# Patient Record
Sex: MALE | Race: ASIAN | NOT HISPANIC OR LATINO | Employment: OTHER | URBAN - METROPOLITAN AREA
[De-identification: names, ages, dates, MRNs, and addresses within clinical notes are randomized per-mention and may not be internally consistent; named-entity substitution may affect disease eponyms.]

---

## 2018-01-03 ENCOUNTER — TRANSCRIBE ORDERS (OUTPATIENT)
Dept: ADMINISTRATIVE | Facility: HOSPITAL | Age: 38
End: 2018-01-03

## 2018-01-03 ENCOUNTER — ALLSCRIPTS OFFICE VISIT (OUTPATIENT)
Dept: OTHER | Facility: OTHER | Age: 38
End: 2018-01-03

## 2018-01-03 DIAGNOSIS — G40.309 GENERALIZED IDIOPATHIC EPILEPSY AND EPILEPTIC SYNDROMES, WITHOUT STATUS EPILEPTICUS, NOT INTRACTABLE (HCC): ICD-10-CM

## 2018-01-03 DIAGNOSIS — G40.802 CURSIVE EPILEPSY (HCC): Primary | ICD-10-CM

## 2018-01-04 ENCOUNTER — APPOINTMENT (OUTPATIENT)
Dept: LAB | Facility: HOSPITAL | Age: 38
End: 2018-01-04
Attending: PSYCHIATRY & NEUROLOGY
Payer: COMMERCIAL

## 2018-01-04 DIAGNOSIS — G40.309 GENERALIZED IDIOPATHIC EPILEPSY AND EPILEPTIC SYNDROMES, WITHOUT STATUS EPILEPTICUS, NOT INTRACTABLE (HCC): ICD-10-CM

## 2018-01-04 LAB
ALBUMIN SERPL BCP-MCNC: 4.5 G/DL (ref 3.5–5)
ALP SERPL-CCNC: 59 U/L (ref 46–116)
ALT SERPL W P-5'-P-CCNC: 79 U/L (ref 12–78)
ANION GAP SERPL CALCULATED.3IONS-SCNC: 9 MMOL/L (ref 4–13)
AST SERPL W P-5'-P-CCNC: 38 U/L (ref 5–45)
BASOPHILS # BLD AUTO: 0.03 THOUSANDS/ΜL (ref 0–0.1)
BASOPHILS NFR BLD AUTO: 1 % (ref 0–1)
BILIRUB SERPL-MCNC: 0.5 MG/DL (ref 0.2–1)
BUN SERPL-MCNC: 14 MG/DL (ref 5–25)
CALCIUM SERPL-MCNC: 9.8 MG/DL (ref 8.3–10.1)
CHLORIDE SERPL-SCNC: 101 MMOL/L (ref 100–108)
CO2 SERPL-SCNC: 30 MMOL/L (ref 21–32)
CREAT SERPL-MCNC: 1.11 MG/DL (ref 0.6–1.3)
EOSINOPHIL # BLD AUTO: 0.17 THOUSAND/ΜL (ref 0–0.61)
EOSINOPHIL NFR BLD AUTO: 3 % (ref 0–6)
ERYTHROCYTE [DISTWIDTH] IN BLOOD BY AUTOMATED COUNT: 11.9 % (ref 11.6–15.1)
GFR SERPL CREATININE-BSD FRML MDRD: 86 ML/MIN/1.73SQ M
GLUCOSE SERPL-MCNC: 117 MG/DL (ref 65–140)
HCT VFR BLD AUTO: 45.4 % (ref 36.5–49.3)
HGB BLD-MCNC: 15.3 G/DL (ref 12–17)
LYMPHOCYTES # BLD AUTO: 1.77 THOUSANDS/ΜL (ref 0.6–4.47)
LYMPHOCYTES NFR BLD AUTO: 35 % (ref 14–44)
MCH RBC QN AUTO: 30.1 PG (ref 26.8–34.3)
MCHC RBC AUTO-ENTMCNC: 33.7 G/DL (ref 31.4–37.4)
MCV RBC AUTO: 89 FL (ref 82–98)
MONOCYTES # BLD AUTO: 0.29 THOUSAND/ΜL (ref 0.17–1.22)
MONOCYTES NFR BLD AUTO: 6 % (ref 4–12)
NEUTROPHILS # BLD AUTO: 2.83 THOUSANDS/ΜL (ref 1.85–7.62)
NEUTS SEG NFR BLD AUTO: 55 % (ref 43–75)
NRBC BLD AUTO-RTO: 0 /100 WBCS
PLATELET # BLD AUTO: 183 THOUSANDS/UL (ref 149–390)
PMV BLD AUTO: 10.4 FL (ref 8.9–12.7)
POTASSIUM SERPL-SCNC: 4.3 MMOL/L (ref 3.5–5.3)
PROT SERPL-MCNC: 7.8 G/DL (ref 6.4–8.2)
RBC # BLD AUTO: 5.09 MILLION/UL (ref 3.88–5.62)
SODIUM SERPL-SCNC: 140 MMOL/L (ref 136–145)
VALPROATE SERPL-MCNC: 50 UG/ML (ref 50–100)
WBC # BLD AUTO: 5.11 THOUSAND/UL (ref 4.31–10.16)

## 2018-01-04 PROCEDURE — 36415 COLL VENOUS BLD VENIPUNCTURE: CPT

## 2018-01-04 PROCEDURE — 80164 ASSAY DIPROPYLACETIC ACD TOT: CPT

## 2018-01-04 PROCEDURE — 85025 COMPLETE CBC W/AUTO DIFF WBC: CPT

## 2018-01-04 PROCEDURE — 80053 COMPREHEN METABOLIC PANEL: CPT

## 2018-01-04 NOTE — PROGRESS NOTES
Assessment   1  Generalized seizure disorder (345 90) (G40 309)   2  Left shoulder strain (840 9) (S46 912A)   3  Strain of left hip, initial encounter (843 9) (P00 127G)    Plan   Generalized seizure disorder    · (1) CBC/PLT/DIFF; Status:Active; Requested [id number]:[id number]; Perform:[facility]; YIQ:[id number];FMPKKLE; For:Generalized seizure disorder; Ordered By:Power Ordonez;   · (1) COMPREHENSIVE METABOLIC PANEL; Status:Active; Requested WDH:[id number]; Perform:[facility]; MZY:[id number];ZHMZQCV; For:Generalized seizure disorder; Ordered By:Power Ordonez;   · (1) VALPROIC ACID (DEPAKOTE); Status:Active; Requested ZKB:[id number]; Perform:[facility]; TXL:[id number];DXDWMCY; For:Generalized seizure disorder; Ordered By:Power Ordonez;   · EEG AWAKE (ROUTINE); Status:Hold For - Scheduling; Requested [id number]:[id number]; Perform:[facility]; PZD:[id number];LZLEUZT; For:Generalized seizure disorder; Ordered By:Power Ordonez;   · Follow-up visit in 3 weeks Evaluation and Treatment  Follow-up  Status: Hold For -    Scheduling  Requested for: [id number]   Ordered; For: Generalized seizure disorder; Ordered By: David Lyons Performed:  Due: 04EAT0984  Left shoulder strain, Strain of left hip, initial encounter    · 1 - Inocencia Palumbo MD Orthopedic [facility]  *  Status: Active     Requested for: [id number]   Ordered; For: Left shoulder strain, Strain of left hip, initial encounter; Ordered By: David Lyons Performed:  Due: 92UWB2575  Care Summary provided  : Yes    Discussion/Summary   Discussion Summary:    Patient with a history of seizure disorder, has been experiencing aura like symptoms in spite of taking Depakote 250 mg twice a day  At this time is advised to get a Depakote level CBC CMP as well as an EEG  Patient is advised to return back to see me in 3 weeks however prior to that based on his test results further treatment will be offered   Patient also suffers from left shoulder and hip pain and will be referred to orthopedics for further evaluation  Chief Complaint   Chief Complaint Free Text Note Form: Patient is here for a follow up visit for his history of left shoulder strain and generalized seizure disorder  History of Present Illness   HPI: Patient is a 29-year-old right-handed gentleman with a history of generalized seizure disorder on Depakote 250 mg twice a day  He does admit to excessive alcohol consumption in the recent past due to the holidays and has not been feeling well  Patient states that in the last few days that he has been feeling dizzy and feelings that he is going to blackout at different points of the day  Patient denies any seizures and the symptoms have been bothersome for the last 1 week  He denies any nausea vomiting or abdominal pain  Patient did have an MRI of the brain and an EEG last year which were both normal  He also describes significant left shoulder pain as well as left hip pain which has been worsening in the recent past and claims he has been experiencing the symptoms since the last time he had a seizure over a year ago during which time he had suffered a fall  Review of Systems   Neurological ROS:      Constitutional: no fever, no chills, no recent weight gain, no recent weight loss, no complaints of feeling tired, no changes in appetite  HEENT:  no sinus problems, not feeling congested, no blurred vision, no dryness of the eyes, no eye pain, no hearing loss, no tinnitus, no mouth sores, no sore throat, no hoarseness, no dysphagia, no masses, no bleeding  Cardiovascular: chest pain or pressure  Respiratory:  no unusual or persistant cough, no shortness of breath with or without exertion  Gastrointestinal:  no nausea, no vomiting, no diarrhea, no abdominal pain, no changes in bowel habits, no melena, no loss of bowel control        Genitourinary:  no incontinence, no feelings of urinary urgency, no increase in frequency, no urinary hesitancy, no dysuria, no hematuria  Musculoskeletal: arthralgias,-- myalgias,-- head/neck/back pain-- and-- pain when sitting  Integumentary  no masses, no rash, no skin lesions, no livedo reticularis  Psychiatric:  no anxiety, no depression, no mood swings, no psychiatric hospitalizations, no sleep problems  Endocrine  no unusual weight loss or gain, no excessive urination, no excessive thirst, no hair loss or gain, no hot or cold intolerance, no menstrual period change or irregularity, no loss of sexual ability or drive, no erection difficulty, no nipple discharge  Hematologic/Lymphatic:  no unusual bleeding, no tendency for easy bruising, no clotting skin or lumps  Neurological General: blackouts-- and-- waking up at night  Neurological Mental Status: confusion-- and-- memory problems  Neurological Cranial Nerves: vertigo or dizziness--   no blurry or double vision, no loss of vision, no face drooping, no facial numbness or weakness, no taste or smell loss/changes, no hearing loss or ringing, no vertigo or dizziness, no dysphagia, no slurred speech  Neurological Motor findings include:  no tremor, no twitching, no cramping(pre/post exercise), no atrophy  Neurological Coordination:  no unsteadiness, no vertigo or dizziness, no clumsiness, no problems reaching for objects  Neurological Sensory:  no numbness, no pain, no tingling, does not fall when eyes closed or taking a shower  Neurological Gait:  no difficulty walking, not falling to one side, no sensation of being pushed, has not had falls  ROS Reviewed:    ROS reviewed  Active Problems   1  Generalized seizure disorder (345 90) (G40 309)   2  Left shoulder strain (840 9) (X72 967K)    Surgical History   1  History of Lithotomy    Family History   Mother    1  Family history of No significant past medical history  Father    2   Family history of No significant past medical history    Social History    · Employed   · Former smoker (Z77 10) (Z25 379)   ·    · Occasional alcohol use  Social History Reviewed: The social history was reviewed and updated today  Current Meds    1  Depakote TBEC; TAKE 1 TABLET DAILY AS DIRECTED; Therapy: (Recorded:16Nov2016) to Recorded  Medication List Reviewed: The medication list was reviewed and updated today  Allergies   1  No Known Drug Allergies  2  No Known Environmental Allergies   3  No Known Food Allergies    Vitals   Signs   Recorded: 18SWM1848 02:43PM   Heart Rate: 75  Systolic: 013  Diastolic: 88  Height: 5 ft 3 in  Weight: 176 lb   BMI Calculated: 31 18  BSA Calculated: 1 83    Physical Exam        Constitutional      General appearance: No acute distress, well appearing and well nourished  Musculoskeletal      Gait and station: Normal gait, stance and balance  Muscle strength: Normal strength throughout  Muscle tone: No atrophy, abnormal movements, flaccidity, cogwheeling or spasticity  Neurologic      Orientation to person, place, and time: Normal        Language: Names objects, able to repeat phrases and speaks spontaneously  3rd, 4th, and 6th cranial nerves: Normal        7th cranial nerve: Normal        Sensation: Normal        Reflexes: Normal        Coordination: Normal   Patient has evidence of tenderness in the left anterior and superior aspect of the shoulder with no difficulty with hyperabduction of the left shoulder  He also has tenderness at the left hip  There is no evidence of any proximal muscle tenderness  Future Appointments      Date/Time Provider Specialty Site   02/14/2018 09:20 AM Noreen Winkler MD Neurology [facility] [address]    Electronically signed by :  Thi Wang MD; Hom[place]  3 2018  3:30PM EST                       (Author)

## 2018-01-23 VITALS
WEIGHT: 176 LBS | BODY MASS INDEX: 31.18 KG/M2 | SYSTOLIC BLOOD PRESSURE: 150 MMHG | DIASTOLIC BLOOD PRESSURE: 88 MMHG | HEIGHT: 63 IN | HEART RATE: 75 BPM

## 2018-01-31 ENCOUNTER — OFFICE VISIT (OUTPATIENT)
Dept: NEUROLOGY | Facility: CLINIC | Age: 38
End: 2018-01-31
Payer: COMMERCIAL

## 2018-01-31 ENCOUNTER — APPOINTMENT (OUTPATIENT)
Dept: LAB | Facility: CLINIC | Age: 38
End: 2018-01-31
Payer: COMMERCIAL

## 2018-01-31 VITALS
SYSTOLIC BLOOD PRESSURE: 134 MMHG | DIASTOLIC BLOOD PRESSURE: 98 MMHG | HEART RATE: 67 BPM | BODY MASS INDEX: 31.35 KG/M2 | WEIGHT: 177 LBS

## 2018-01-31 DIAGNOSIS — S76.012D HIP STRAIN, LEFT, SUBSEQUENT ENCOUNTER: ICD-10-CM

## 2018-01-31 DIAGNOSIS — G40.309 GENERALIZED SEIZURE DISORDER (HCC): ICD-10-CM

## 2018-01-31 DIAGNOSIS — S46.912D STRAIN OF LEFT SHOULDER, SUBSEQUENT ENCOUNTER: ICD-10-CM

## 2018-01-31 DIAGNOSIS — G40.309 GENERALIZED SEIZURE DISORDER (HCC): Primary | ICD-10-CM

## 2018-01-31 LAB
ALBUMIN SERPL BCP-MCNC: 4.5 G/DL (ref 3.5–5)
ALP SERPL-CCNC: 55 U/L (ref 46–116)
ALT SERPL W P-5'-P-CCNC: 61 U/L (ref 12–78)
AST SERPL W P-5'-P-CCNC: 30 U/L (ref 5–45)
BILIRUB DIRECT SERPL-MCNC: 0.11 MG/DL (ref 0–0.2)
BILIRUB SERPL-MCNC: 0.36 MG/DL (ref 0.2–1)
PROT SERPL-MCNC: 8 G/DL (ref 6.4–8.2)

## 2018-01-31 PROCEDURE — 80076 HEPATIC FUNCTION PANEL: CPT

## 2018-01-31 PROCEDURE — 99214 OFFICE O/P EST MOD 30 MIN: CPT | Performed by: PSYCHIATRY & NEUROLOGY

## 2018-01-31 PROCEDURE — 36415 COLL VENOUS BLD VENIPUNCTURE: CPT

## 2018-01-31 PROCEDURE — 80165 DIPROPYLACETIC ACID FREE: CPT

## 2018-01-31 RX ORDER — DIVALPROEX SODIUM 250 MG/1
250 TABLET, DELAYED RELEASE ORAL 2 TIMES DAILY
COMMUNITY
End: 2018-01-31 | Stop reason: SDUPTHER

## 2018-01-31 RX ORDER — DIVALPROEX SODIUM 250 MG/1
250 TABLET, DELAYED RELEASE ORAL 2 TIMES DAILY
Qty: 60 TABLET | Refills: 6 | Status: SHIPPED | OUTPATIENT
Start: 2018-01-31 | End: 2018-07-30 | Stop reason: SDUPTHER

## 2018-01-31 NOTE — PROGRESS NOTES
Patient ID: Denilson Hewitt is a 30 y o  male with a history of seizure disorder  Assessment/Plan:  1  Generalized seizure disorder (HCC)  Valproic acid level, free    divalproex sodium (DEPAKOTE) 250 mg EC tablet    Hepatic function panel   2  Hip strain, left, subsequent encounter  Ambulatory referral to Orthopedic Surgery   3  Strain of left shoulder, subsequent encounter         Patient with a history of seizure disorder has been doing well on his current dosage of Depakote 250 milligrams twice a day, is advised to repeat his Depakote level as well as his liver enzymes in the near future  Patient also has persistent left hip pain and is advised orthopedic evaluation for the same  He will return back to see me in 2-3 months  Subjective:    HPI patient is here for a follow-up visit and since his last visit his Depakote dosage was increased to 250 milligrams twice a day with significant relief of symptoms  Patient claims he has been feeling much better and has not had any recurrent symptoms of fogginess and his Depakote level was 50 with mildly elevated ALT and patient claims he also has stopped consuming alcoholic beverages  Patient also describes pain in his left hip and denies any history of trauma except in his childhood when he had suffered a fall and the pain in the left hip has been bothersome with difficulty ambulating at times  He denies any pain radiating down the left lower extremity or any sensory motor symptoms  Past Surgical History:   Procedure Laterality Date    LITHOTRIPSY         History reviewed  No pertinent family history  reports that he has quit smoking  He has never used smokeless tobacco  He reports that he drinks alcohol  He reports that he does not use drugs  Patient has no known allergies  has a current medication list which includes the following prescription(s): divalproex sodium and multiple vitamins-minerals  History reviewed   No pertinent past medical history  Objective:    Blood pressure 134/98, pulse 67, weight 80 3 kg (177 lb)  Physical Exam On general examination the patient is alert awake oriented,  vital signs are stable, HEENT normocephalic atraumatic , pupils equal and reactive to light and accommodation, conjunctivae is pink, sclerae is anicteric, throat is clear, neck is supple no jugular venous distension, no masses palpable  Neurological Exam on neurological examination patient is alert awake oriented, higher functions are intact, no cranial nerve deficit was noted, there is no evidence of any nystagmus, motor and sensory examination also reveals normal strength with deep tendon reflexes being preserved and normal sensation to pinprick and light touch except along the anterolateral aspect of the left thigh  There is no evidence of any dysmetria and his gait is normal based  Patient has evidence of significant hip tenderness which worsens with abduction of the left thigh  There is tenderness noted along the greater trochanter  No lumbosacral tenderness was noted  ROS:    Review of Systems   Constitutional: Negative  HENT: Negative  Eyes: Negative  Respiratory: Negative  Cardiovascular: Negative  Gastrointestinal: Negative  Endocrine: Negative  Genitourinary: Negative  Musculoskeletal: Negative  Skin: Negative  Allergic/Immunologic: Negative  Neurological: Negative  Hematological: Negative  Psychiatric/Behavioral: Negative

## 2018-02-02 LAB — VALPROATE FREE SERPL-MCNC: 3 UG/ML (ref 6–22)

## 2018-07-30 DIAGNOSIS — G40.309 GENERALIZED SEIZURE DISORDER (HCC): ICD-10-CM

## 2018-07-30 RX ORDER — LEVETIRACETAM 250 MG/1
250 TABLET ORAL 2 TIMES DAILY
Qty: 60 TABLET | Refills: 0 | Status: CANCELLED | OUTPATIENT
Start: 2018-07-30

## 2018-07-30 RX ORDER — DIVALPROEX SODIUM 250 MG/1
250 TABLET, DELAYED RELEASE ORAL EVERY 12 HOURS SCHEDULED
Qty: 60 TABLET | Refills: 6 | Status: SHIPPED | OUTPATIENT
Start: 2018-07-30 | End: 2018-10-03 | Stop reason: ALTCHOICE

## 2018-07-30 NOTE — TELEPHONE ENCOUNTER
Patient requesting rx refills for both depakote and keppra  Patient states he is out keppra, hasn't had it for 2 days  Is he supposed to be taking both, I don't see anything mentioned in last office visit note that he was on keppra      [phone]

## 2018-09-19 ENCOUNTER — OFFICE VISIT (OUTPATIENT)
Dept: NEUROLOGY | Facility: CLINIC | Age: 38
End: 2018-09-19
Payer: COMMERCIAL

## 2018-09-19 VITALS
DIASTOLIC BLOOD PRESSURE: 104 MMHG | HEART RATE: 66 BPM | HEIGHT: 64 IN | SYSTOLIC BLOOD PRESSURE: 140 MMHG | WEIGHT: 172 LBS | BODY MASS INDEX: 29.37 KG/M2

## 2018-09-19 DIAGNOSIS — I10 ESSENTIAL HYPERTENSION: ICD-10-CM

## 2018-09-19 DIAGNOSIS — G40.309 GENERALIZED SEIZURE DISORDER (HCC): Primary | ICD-10-CM

## 2018-09-19 PROCEDURE — 99214 OFFICE O/P EST MOD 30 MIN: CPT | Performed by: PSYCHIATRY & NEUROLOGY

## 2018-09-19 NOTE — PROGRESS NOTES
Progress Note - Neurology   Marjorie Earl 41 y o  male MRN: [id number]  Unit/Bed#:  Encounter: [id number]      Subjective:   Patient is here for a follow-up visit accompanied with his wife on an emergency basis since he has not been feeling well for the last 1 week  At baseline he has a history of generalized seizure disorder and for the last 1 week has been experiencing a pressure like retro-orbital pain and a dull headache with severe insomnia and fatigue  He denies any seizure-like symptoms and remains on Depakote 250 mg twice a day  Patient admits to being under severe stress and also admits to consuming alcoholic beverages  on a daily basis  His blood pressure was also noted to be elevated otherwise denies any vascular features to the headache, blurred vision diplopia perioral numbness vertigo or dizziness and denies any motor or sensory symptoms in the upper or lower extremities  Patient does not have a primary physician  ROS:   Review of Systems   Constitutional: Positive for fatigue  Negative for appetite change and fever  HENT: Positive for tinnitus  Negative for ear pain, hearing loss, trouble swallowing and voice change  Eyes: Positive for photophobia and pain  Respiratory: Negative  Negative for shortness of breath  Snoring   Cardiovascular: Negative  Negative for palpitations  Gastrointestinal: Negative  Negative for abdominal pain, nausea and vomiting  Endocrine: Negative  Negative for cold intolerance and heat intolerance  Genitourinary: Negative  Negative for dysuria, frequency and urgency  Musculoskeletal: Negative  Negative for back pain, gait problem, myalgias and neck pain  Skin: Negative  Negative for rash  Neurological: Positive for dizziness, speech difficulty, weakness and headaches  Negative for tremors, seizures, syncope, facial asymmetry, light-headedness and numbness  Hematological: Negative  Does not bruise/bleed easily  Psychiatric/Behavioral: Positive for confusion, decreased concentration and sleep disturbance  Negative for hallucinations  Vitals:   Vitals:    09/19/18 1151   BP: (!) 140/104   Pulse:    ,Body mass index is 29 99 kg/m²  MEDS:      Current Outpatient Prescriptions:     divalproex sodium (DEPAKOTE) 250 mg EC tablet, Take 1 tablet (250 mg total) by mouth every 12 (twelve) hours, Disp: 60 tablet, Rfl: 6    Multiple Vitamins-Minerals (CENTRUM SILVER PO), Take by mouth, Disp: , Rfl:   :    Physical Exam:  General appearance: alert, appears stated age and cooperative  Head: Normocephalic, without obvious abnormality, atraumatic    Neurologic:  Patient is alert awake oriented, high functions are intact, speech is fluent  No evidence of any aphasia or dysarthria  Cranial nerve examination reveals visual fields are full to threat, pupils equal and reactive, extraocular movements intact, fundi showed sharp disc margins, sensation in the V1 V2 V3 distribution is symmetric, no obvious facial asymmetry noted,Hearing is preserved, tongue is midline and gag is adequate, shoulder shrug is symmetric bilaterally  Motor examination reveals normal tone and bulk, no evidence of any drift to the outstretched extremities, strength is 5/5 preserved bilaterally in both upper and lower extremities, deep tendon reflexes are intact, toes are downgoing  Sensory examination to pinprick light touch proprioception and vibration is preserved bilaterally, patient does not extinguish double simultaneous stimuli  Coordination no evidence of any finger-to-nose dysmetria, no evidence of any dysdiadochokinesia,  Gait is normal based Romberg sign is negative  Lab Results: I have personally reviewed pertinent reports  Imaging Studies: I have personally reviewed pertinent reports  Assessment:  1  Seizure disorder  2  Essential hypertension      Plan:  Patient will be referred to Internal Medicine for further evaluation of his hypertension, in the meanwhile is advised a CBC CMP and a Depakote level  If the Depakote level is subtherapeutic will increase the dose to 500 mg twice a day  Patient is also advised to refrain from alcohol usage while using Depakote due to potential hepatotoxicity  He will return back to see me in 3 weeks  9/19/2018,11:51 AM    Dictation voice to text software has been used in the creation of this document  Please consider this in light of any contextual or grammatical errors

## 2018-09-24 DIAGNOSIS — G40.909 SEIZURE DISORDER (HCC): Primary | ICD-10-CM

## 2018-09-25 ENCOUNTER — TELEPHONE (OUTPATIENT)
Dept: NEUROLOGY | Facility: CLINIC | Age: 38
End: 2018-09-25

## 2018-09-25 DIAGNOSIS — G40.909 SEIZURE DISORDER (HCC): Primary | ICD-10-CM

## 2018-09-25 NOTE — TELEPHONE ENCOUNTER
I have informed pt not to call me on my personal line going forward  pt with side effects with Depakote has to be discontinued and has had side effects in the past on Keppra    Please see if briviact is approved

## 2018-09-25 NOTE — TELEPHONE ENCOUNTER
Pt called stating Briviact requires PA  I am in the process of completing it  Please provide rationale as I do not see this medication mentioned in office note/chart

## 2018-09-28 RX ORDER — LEVETIRACETAM 250 MG/1
250 TABLET ORAL 2 TIMES DAILY
Qty: 60 TABLET | Refills: 2 | Status: SHIPPED | OUTPATIENT
Start: 2018-09-28 | End: 2018-10-03 | Stop reason: ALTCHOICE

## 2018-09-28 NOTE — TELEPHONE ENCOUNTER
Called patient, advised to discontinue Depakote and was started on Keppra 250 mg twice a day  Prescription sent to pharmacy

## 2018-09-28 NOTE — TELEPHONE ENCOUNTER
Patient is c/o pain all over his body and cannot sleep  He states he needs an alt med since he cannot take the depakote, and we are still awaiting a determination on Briviact  Patient is upset that the PA was not completed on 9/25 when he informed us of this, informed the patient that we had to get additional info from  to complete the PA before it could be submitted      [phone]

## 2018-10-03 ENCOUNTER — OFFICE VISIT (OUTPATIENT)
Dept: NEUROLOGY | Facility: CLINIC | Age: 38
End: 2018-10-03

## 2018-10-03 VITALS
HEIGHT: 64 IN | SYSTOLIC BLOOD PRESSURE: 140 MMHG | BODY MASS INDEX: 29.19 KG/M2 | DIASTOLIC BLOOD PRESSURE: 104 MMHG | WEIGHT: 171 LBS | HEART RATE: 70 BPM

## 2018-10-03 DIAGNOSIS — S76.012D HIP STRAIN, LEFT, SUBSEQUENT ENCOUNTER: ICD-10-CM

## 2018-10-03 DIAGNOSIS — M79.10 MYALGIA: ICD-10-CM

## 2018-10-03 DIAGNOSIS — G40.309 GENERALIZED SEIZURE DISORDER (HCC): Primary | ICD-10-CM

## 2018-10-03 PROCEDURE — 99213 OFFICE O/P EST LOW 20 MIN: CPT | Performed by: PSYCHIATRY & NEUROLOGY

## 2018-10-03 RX ORDER — OXCARBAZEPINE 300 MG/1
300 TABLET, FILM COATED ORAL EVERY 12 HOURS SCHEDULED
Qty: 60 TABLET | Refills: 1 | Status: SHIPPED | OUTPATIENT
Start: 2018-10-03 | End: 2020-10-07 | Stop reason: ALTCHOICE

## 2018-10-03 NOTE — TELEPHONE ENCOUNTER
pt called and states that he is having side effects, body aches and can't sleep since stating levetiracetam   he states that if he takes this once a day he tolerates but if he takes 2 tabs he has these symptoms  pt then asked if he could call me back      briviact approved 8/1/18 till 12/31/2099  please advise

## 2019-01-08 ENCOUNTER — TELEPHONE (OUTPATIENT)
Dept: NEUROLOGY | Facility: CLINIC | Age: 39
End: 2019-01-08

## 2019-01-08 NOTE — TELEPHONE ENCOUNTER
Patient has an appt on 02/26/19 that needs to be r/s due to Dr Jono Buenrostro being out of office  LMOM letting patient know I have appointments available on February 18 and February 19 with Dr Jono Buenrostro  Please schedule appt when patient calls back       Thank Tasia Hadley

## 2019-01-17 ENCOUNTER — TELEPHONE (OUTPATIENT)
Dept: NEUROLOGY | Facility: CLINIC | Age: 39
End: 2019-01-17

## 2019-01-17 NOTE — TELEPHONE ENCOUNTER
LMOM cancelling patients appointment on 02/26/19 due to Dr Ashlie Gillette will be out ot office   Please schedule an sovs when patient calls back    Thank you

## 2019-06-27 ENCOUNTER — TRANSCRIBE ORDERS (OUTPATIENT)
Dept: ADMINISTRATIVE | Facility: HOSPITAL | Age: 39
End: 2019-06-27

## 2019-06-27 DIAGNOSIS — M25.552 PAIN OF LEFT HIP JOINT: Primary | ICD-10-CM

## 2019-07-09 ENCOUNTER — HOSPITAL ENCOUNTER (OUTPATIENT)
Dept: MRI IMAGING | Facility: HOSPITAL | Age: 39
Discharge: HOME | End: 2019-07-09
Payer: COMMERCIAL

## 2019-07-09 DIAGNOSIS — M25.552 PAIN OF LEFT HIP JOINT: ICD-10-CM

## 2019-07-09 PROCEDURE — 72195 MRI PELVIS W/O DYE: CPT

## 2020-10-07 ENCOUNTER — OFFICE VISIT (OUTPATIENT)
Dept: NEUROLOGY | Facility: CLINIC | Age: 40
End: 2020-10-07
Payer: COMMERCIAL

## 2020-10-07 VITALS
HEIGHT: 63 IN | BODY MASS INDEX: 32.78 KG/M2 | WEIGHT: 185 LBS | SYSTOLIC BLOOD PRESSURE: 134 MMHG | HEART RATE: 78 BPM | DIASTOLIC BLOOD PRESSURE: 100 MMHG

## 2020-10-07 DIAGNOSIS — G40.309 GENERALIZED SEIZURE DISORDER (HCC): Primary | ICD-10-CM

## 2020-10-07 PROCEDURE — 99213 OFFICE O/P EST LOW 20 MIN: CPT | Performed by: PSYCHIATRY & NEUROLOGY

## 2021-02-19 ENCOUNTER — TELEPHONE (OUTPATIENT)
Dept: NEUROLOGY | Facility: CLINIC | Age: 41
End: 2021-02-19

## 2021-02-19 NOTE — TELEPHONE ENCOUNTER
Spoke with patient and he will go to [facility] to have his labs done tomorrow Saturday for f/u Tuesday 2/23 with Dr Marylen Felling

## 2021-02-20 ENCOUNTER — LAB (OUTPATIENT)
Dept: LAB | Facility: HOSPITAL | Age: 41
End: 2021-02-20
Attending: PSYCHIATRY & NEUROLOGY
Payer: COMMERCIAL

## 2021-02-20 DIAGNOSIS — G40.309 GENERALIZED SEIZURE DISORDER (HCC): ICD-10-CM

## 2021-02-20 LAB
ALBUMIN SERPL BCP-MCNC: 4.2 G/DL (ref 3.5–5)
ALP SERPL-CCNC: 63 U/L (ref 46–116)
ALT SERPL W P-5'-P-CCNC: 88 U/L (ref 12–78)
ANION GAP SERPL CALCULATED.3IONS-SCNC: 9 MMOL/L (ref 4–13)
AST SERPL W P-5'-P-CCNC: 43 U/L (ref 5–45)
BILIRUB SERPL-MCNC: 0.4 MG/DL (ref 0.2–1)
BUN SERPL-MCNC: 10 MG/DL (ref 5–25)
CALCIUM SERPL-MCNC: 9.2 MG/DL (ref 8.3–10.1)
CHLORIDE SERPL-SCNC: 102 MMOL/L (ref 100–108)
CO2 SERPL-SCNC: 30 MMOL/L (ref 21–32)
CREAT SERPL-MCNC: 0.94 MG/DL (ref 0.6–1.3)
ERYTHROCYTE [DISTWIDTH] IN BLOOD BY AUTOMATED COUNT: 12.3 % (ref 11.6–15.1)
GFR SERPL CREATININE-BSD FRML MDRD: 102 ML/MIN/1.73SQ M
GLUCOSE P FAST SERPL-MCNC: 107 MG/DL (ref 65–99)
HCT VFR BLD AUTO: 42.6 % (ref 36.5–49.3)
HGB BLD-MCNC: 14.3 G/DL (ref 12–17)
MCH RBC QN AUTO: 29.7 PG (ref 26.8–34.3)
MCHC RBC AUTO-ENTMCNC: 33.6 G/DL (ref 31.4–37.4)
MCV RBC AUTO: 88 FL (ref 82–98)
PLATELET # BLD AUTO: 236 THOUSANDS/UL (ref 149–390)
PMV BLD AUTO: 10.6 FL (ref 8.9–12.7)
POTASSIUM SERPL-SCNC: 4.5 MMOL/L (ref 3.5–5.3)
PROT SERPL-MCNC: 7.7 G/DL (ref 6.4–8.2)
RBC # BLD AUTO: 4.82 MILLION/UL (ref 3.88–5.62)
SODIUM SERPL-SCNC: 141 MMOL/L (ref 136–145)
WBC # BLD AUTO: 6.12 THOUSAND/UL (ref 4.31–10.16)

## 2021-02-20 PROCEDURE — 85027 COMPLETE CBC AUTOMATED: CPT

## 2021-02-20 PROCEDURE — 80053 COMPREHEN METABOLIC PANEL: CPT

## 2021-02-20 PROCEDURE — 36415 COLL VENOUS BLD VENIPUNCTURE: CPT

## 2021-02-23 ENCOUNTER — OFFICE VISIT (OUTPATIENT)
Dept: NEUROLOGY | Facility: CLINIC | Age: 41
End: 2021-02-23
Payer: COMMERCIAL

## 2021-02-23 VITALS
HEART RATE: 68 BPM | HEIGHT: 63 IN | SYSTOLIC BLOOD PRESSURE: 128 MMHG | BODY MASS INDEX: 32.71 KG/M2 | DIASTOLIC BLOOD PRESSURE: 88 MMHG | WEIGHT: 184.6 LBS

## 2021-02-23 DIAGNOSIS — G40.309 GENERALIZED SEIZURE DISORDER (HCC): Primary | ICD-10-CM

## 2021-02-23 PROCEDURE — 99213 OFFICE O/P EST LOW 20 MIN: CPT | Performed by: PSYCHIATRY & NEUROLOGY

## 2021-02-23 NOTE — PROGRESS NOTES
Progress Note - Neurology   Subhashmit Deb Latif 47 y o  male MRN: [id number]  Unit/Bed#:  Encounter: [id number]      Subjective:     Patient is here for a follow-up visit for generalized seizure disorder under control with Depakote 300 milligrams daily  Since his last visit he has been seizure-free, denies any side effects from the medications, and denies any episodes of fogginess or dizziness anymore  Patient also limits his alcohol intake and denies any other neurological symptoms  Patient's blood work was reviewed and his ALT is mildly elevated but stable  ROS:   Review of Systems   Constitutional: Negative  Negative for appetite change and fever  HENT: Negative  Negative for hearing loss, tinnitus, trouble swallowing and voice change  Eyes: Negative  Negative for photophobia and pain  Respiratory: Negative  Negative for shortness of breath  Cardiovascular: Negative  Negative for palpitations  Gastrointestinal: Negative  Negative for nausea and vomiting  Endocrine: Negative  Negative for cold intolerance  Genitourinary: Negative  Negative for dysuria, frequency and urgency  Musculoskeletal: Negative  Negative for myalgias and neck pain  Skin: Negative  Negative for rash  Allergic/Immunologic: Negative  Neurological: Negative  Negative for dizziness, tremors, seizures, syncope, facial asymmetry, speech difficulty, weakness, light-headedness, numbness and headaches  Hematological: Negative  Does not bruise/bleed easily  Psychiatric/Behavioral: Negative  Negative for confusion, hallucinations and sleep disturbance  MA review of systems was reviewed by myself  Vitals:   Vitals:    02/23/21 1533   BP: 128/88   BP Location: Left arm   Patient Position: Sitting   Cuff Size: Large   Pulse: 68   Weight: 83 7 kg (184 lb 9 6 oz)   Height: 5' 3" (1 6 m)   ,Body mass index is 32 7 kg/m²      MEDS:      Current Outpatient Medications:     Multiple Vitamins-Minerals (CENTRUM SILVER PO), Take by mouth, Disp: , Rfl:     Valproate Sodium (VALPROIC ACID PO), Take 300 mg by mouth daily Medication filled in [facility], Disp: , Rfl:   :    Physical Exam:  General appearance: alert, appears stated age and cooperative  Head: Normocephalic, without obvious abnormality, atraumatic      On neurological examination patient is alert awake oriented, speech is fluent, cranial nerves 2-12 intact, on motor and sensory exam he has no evidence of any focal weakness or any sensory loss in the upper or lower extremities  No evidence of any dysmetria was noted and his gait is normal based  Lab Results: I have personally reviewed pertinent reports  Imaging Studies: I have personally reviewed pertinent reports  Assessment:  1  Generalized Seizure disorder  Plan:    Patient is advised to continue Depakote at the present dosage, his blood pressure remains high is advised follow-up with his PCP, his fasting blood sugar was also 107 and advised further evaluation  Patient is anxious to discontinue medications at this time and was advised not to do so  Since in the past when he was noncompliant with his medication he was experiencing symptoms  Patient will return back to see me in 6 months       2/23/2021,3:40 PM    Dictation voice to text software has been used in the creation of this document  Please consider this in light of any contextual or grammatical errors

## 2021-05-26 ENCOUNTER — TELEPHONE (OUTPATIENT)
Dept: NEUROLOGY | Facility: CLINIC | Age: 41
End: 2021-05-26

## 2021-05-26 NOTE — TELEPHONE ENCOUNTER
Left detailed message informing patient that his appointment on 09/29/2021 @ 2:00PM with Dr Jono Buenrostro needed to be rescheduled       His new appointment date is 12/01/2021 @ 2:30PM

## 2021-09-14 ENCOUNTER — TELEPHONE (OUTPATIENT)
Dept: NEUROLOGY | Facility: CLINIC | Age: 41
End: 2021-09-14

## 2021-12-04 DIAGNOSIS — G40.309 GENERALIZED SEIZURE DISORDER (HCC): Primary | ICD-10-CM

## 2021-12-04 RX ORDER — OXCARBAZEPINE 300 MG/1
300 TABLET, FILM COATED ORAL EVERY 12 HOURS SCHEDULED
Qty: 60 TABLET | Refills: 1 | Status: SHIPPED | OUTPATIENT
Start: 2021-12-04

## 2022-01-04 ENCOUNTER — TELEPHONE (OUTPATIENT)
Dept: NEUROLOGY | Facility: CLINIC | Age: 42
End: 2022-01-04

## 2022-01-04 NOTE — TELEPHONE ENCOUNTER
Pt calling to report medication lost  Pt states pharmacy stating medication refill not available until Friday 1/7  Called pharmacy  State that medication is available for   Pt made aware

## 2022-12-30 ENCOUNTER — OFFICE VISIT (OUTPATIENT)
Dept: URGENT CARE | Facility: MEDICAL CENTER | Age: 42
End: 2022-12-30

## 2022-12-30 VITALS
RESPIRATION RATE: 18 BRPM | HEART RATE: 90 BPM | OXYGEN SATURATION: 99 % | WEIGHT: 189 LBS | TEMPERATURE: 98.5 F | BODY MASS INDEX: 33.48 KG/M2

## 2022-12-30 DIAGNOSIS — H53.8 BLURRED VISION, RIGHT EYE: Primary | ICD-10-CM

## 2022-12-30 NOTE — PATIENT INSTRUCTIONS
Blurred vision  Conferred to the emergency room for further evaluation  Follow up with PCP in 3-5 days  Proceed to  ER if symptoms worsen

## 2022-12-30 NOTE — PROGRESS NOTES
[email or web address]        NAME: Dori Parker is a 38 y o  male  : 1980    MRN: [id number]  DATE: 2022  TIME: 12:24 PM    Assessment and Plan   Blurred vision, right eye [H53 8]  1  Blurred vision, right eye  Transfer to other facility            Patient Instructions     Blurred vision  Conferred to the emergency room for further evaluation  Follow up with PCP in 3-5 days  Proceed to  ER if symptoms worsen  Chief Complaint     Chief Complaint   Patient presents with   • Blurred Vision     Started today          History of Present Illness       46-year-old male with past medical history of seizures presents complaining of blurred vision to the right eye x1 day  Denies history of trauma, fevers, foreign body, pain      Review of Systems   Review of Systems   Constitutional: Negative  HENT: Negative  Eyes: Positive for visual disturbance  Negative for photophobia, pain, discharge, redness and itching  Respiratory: Negative  Negative for apnea, cough, choking, chest tightness, shortness of breath, wheezing and stridor  Cardiovascular: Negative  Negative for chest pain           Current Medications       Current Outpatient Medications:   •  Multiple Vitamins-Minerals (CENTRUM SILVER PO), Take by mouth, Disp: , Rfl:   •  OXcarbazepine (TRILEPTAL) 300 mg tablet, Take 1 tablet (300 mg total) by mouth every 12 (twelve) hours, Disp: 60 tablet, Rfl: 1  •  Valproate Sodium (VALPROIC ACID PO), Take 300 mg by mouth daily Medication filled in [facility] (Patient not taking: Reported on 2022), Disp: , Rfl:     Current Allergies     Allergies as of 2022   • (No Known Allergies)            The following portions of the patient's history were reviewed and updated as appropriate: allergies, current medications, past family history, past medical history, past social history, past surgical history and problem list      Past Medical History:   Diagnosis Date   • Generalized seizure disorder ([facility] [address] )    • Hip strain, left, subsequent encounter    • Seizures ([facility] [address] )    • Shoulder strain, left, subsequent encounter        Past Surgical History:   Procedure Laterality Date   • LITHOTRIPSY         Family History   Problem Relation Age of Onset   • No Known Problems Mother    • Hypertension Father    • Diabetes Father    • No Known Problems Brother          Medications have been verified  Objective   Pulse 90   Temp 98 5 °F (36 9 °C) (Temporal)   Resp 18   Wt 85 7 kg (189 lb)   SpO2 99%   BMI 33 48 kg/m²        Physical Exam     Physical Exam  Constitutional:       General: He is not in acute distress  Appearance: Normal appearance  He is well-developed  He is not diaphoretic  HENT:      Head: Normocephalic and atraumatic  Cardiovascular:      Rate and Rhythm: Normal rate and regular rhythm  Heart sounds: Normal heart sounds  Pulmonary:      Effort: Pulmonary effort is normal  No respiratory distress  Breath sounds: Normal breath sounds  No wheezing or rales  Chest:      Chest wall: No tenderness  Musculoskeletal:      Cervical back: Normal range of motion and neck supple  Lymphadenopathy:      Cervical: No cervical adenopathy  Neurological:      General: No focal deficit present  Mental Status: He is alert and oriented to person, place, and time  GCS: GCS eye subscore is 4  GCS verbal subscore is 5  GCS motor subscore is 6  Cranial Nerves: Cranial nerves 2-12 are intact  Sensory: Sensation is intact  Motor: Motor function is intact  Coordination: Coordination is intact  Gait: Gait is intact

## 2023-10-22 ENCOUNTER — APPOINTMENT (EMERGENCY)
Dept: RADIOLOGY | Facility: HOSPITAL | Age: 43
End: 2023-10-22
Payer: COMMERCIAL

## 2023-10-22 ENCOUNTER — APPOINTMENT (EMERGENCY)
Dept: CT IMAGING | Facility: HOSPITAL | Age: 43
End: 2023-10-22
Payer: COMMERCIAL

## 2023-10-22 ENCOUNTER — HOSPITAL ENCOUNTER (OUTPATIENT)
Facility: HOSPITAL | Age: 43
Setting detail: OBSERVATION
Discharge: LEFT BEFORE DISCHARGE | End: 2023-10-22
Attending: EMERGENCY MEDICINE | Admitting: INTERNAL MEDICINE
Payer: COMMERCIAL

## 2023-10-22 VITALS
OXYGEN SATURATION: 99 % | TEMPERATURE: 98.6 F | DIASTOLIC BLOOD PRESSURE: 103 MMHG | SYSTOLIC BLOOD PRESSURE: 145 MMHG | RESPIRATION RATE: 16 BRPM | BODY MASS INDEX: 34.21 KG/M2 | WEIGHT: 193.12 LBS | HEART RATE: 85 BPM

## 2023-10-22 DIAGNOSIS — G40.919 BREAKTHROUGH SEIZURE (HCC): Primary | ICD-10-CM

## 2023-10-22 LAB
ALBUMIN SERPL BCP-MCNC: 4.9 G/DL (ref 3.5–5)
ALP SERPL-CCNC: 67 U/L (ref 34–104)
ALT SERPL W P-5'-P-CCNC: 43 U/L (ref 7–52)
ANION GAP SERPL CALCULATED.3IONS-SCNC: 13 MMOL/L
AST SERPL W P-5'-P-CCNC: 38 U/L (ref 13–39)
BASOPHILS # BLD AUTO: 0.03 THOUSANDS/ÂΜL (ref 0–0.1)
BASOPHILS NFR BLD AUTO: 0 % (ref 0–1)
BILIRUB SERPL-MCNC: 0.38 MG/DL (ref 0.2–1)
BUN SERPL-MCNC: 9 MG/DL (ref 5–25)
CALCIUM SERPL-MCNC: 9.6 MG/DL (ref 8.4–10.2)
CHLORIDE SERPL-SCNC: 103 MMOL/L (ref 96–108)
CO2 SERPL-SCNC: 23 MMOL/L (ref 21–32)
CREAT SERPL-MCNC: 0.95 MG/DL (ref 0.6–1.3)
EOSINOPHIL # BLD AUTO: 0.33 THOUSAND/ÂΜL (ref 0–0.61)
EOSINOPHIL NFR BLD AUTO: 3 % (ref 0–6)
ERYTHROCYTE [DISTWIDTH] IN BLOOD BY AUTOMATED COUNT: 13.2 % (ref 11.6–15.1)
GFR SERPL CREATININE-BSD FRML MDRD: 99 ML/MIN/1.73SQ M
GLUCOSE SERPL-MCNC: 94 MG/DL (ref 65–140)
HCT VFR BLD AUTO: 41.8 % (ref 36.5–49.3)
HGB BLD-MCNC: 13.6 G/DL (ref 12–17)
IMM GRANULOCYTES # BLD AUTO: 0.12 THOUSAND/UL (ref 0–0.2)
IMM GRANULOCYTES NFR BLD AUTO: 1 % (ref 0–2)
LYMPHOCYTES # BLD AUTO: 3.5 THOUSANDS/ÂΜL (ref 0.6–4.47)
LYMPHOCYTES NFR BLD AUTO: 36 % (ref 14–44)
MCH RBC QN AUTO: 29.2 PG (ref 26.8–34.3)
MCHC RBC AUTO-ENTMCNC: 32.5 G/DL (ref 31.4–37.4)
MCV RBC AUTO: 90 FL (ref 82–98)
MONOCYTES # BLD AUTO: 0.9 THOUSAND/ÂΜL (ref 0.17–1.22)
MONOCYTES NFR BLD AUTO: 9 % (ref 4–12)
NEUTROPHILS # BLD AUTO: 4.92 THOUSANDS/ÂΜL (ref 1.85–7.62)
NEUTS SEG NFR BLD AUTO: 51 % (ref 43–75)
NRBC BLD AUTO-RTO: 0 /100 WBCS
PLATELET # BLD AUTO: 292 THOUSANDS/UL (ref 149–390)
PMV BLD AUTO: 11.4 FL (ref 8.9–12.7)
POTASSIUM SERPL-SCNC: 3.8 MMOL/L (ref 3.5–5.3)
PROT SERPL-MCNC: 7.6 G/DL (ref 6.4–8.4)
RBC # BLD AUTO: 4.65 MILLION/UL (ref 3.88–5.62)
SODIUM SERPL-SCNC: 139 MMOL/L (ref 135–147)
WBC # BLD AUTO: 9.8 THOUSAND/UL (ref 4.31–10.16)

## 2023-10-22 PROCEDURE — 99285 EMERGENCY DEPT VISIT HI MDM: CPT | Performed by: EMERGENCY MEDICINE

## 2023-10-22 PROCEDURE — 80299 QUANTITATIVE ASSAY DRUG: CPT

## 2023-10-22 PROCEDURE — 36415 COLL VENOUS BLD VENIPUNCTURE: CPT

## 2023-10-22 PROCEDURE — 93005 ELECTROCARDIOGRAM TRACING: CPT

## 2023-10-22 PROCEDURE — 73030 X-RAY EXAM OF SHOULDER: CPT

## 2023-10-22 PROCEDURE — 72125 CT NECK SPINE W/O DYE: CPT

## 2023-10-22 PROCEDURE — 73060 X-RAY EXAM OF HUMERUS: CPT

## 2023-10-22 PROCEDURE — C9254 INJECTION, LACOSAMIDE: HCPCS

## 2023-10-22 PROCEDURE — 85025 COMPLETE CBC W/AUTO DIFF WBC: CPT

## 2023-10-22 PROCEDURE — 70450 CT HEAD/BRAIN W/O DYE: CPT

## 2023-10-22 PROCEDURE — 71046 X-RAY EXAM CHEST 2 VIEWS: CPT

## 2023-10-22 PROCEDURE — 80053 COMPREHEN METABOLIC PANEL: CPT

## 2023-10-22 PROCEDURE — G1004 CDSM NDSC: HCPCS

## 2023-10-22 RX ORDER — ONDANSETRON 2 MG/ML
4 INJECTION INTRAMUSCULAR; INTRAVENOUS EVERY 6 HOURS PRN
Status: CANCELLED | OUTPATIENT
Start: 2023-10-22

## 2023-10-22 RX ORDER — HYDROMORPHONE HCL/PF 1 MG/ML
0.5 SYRINGE (ML) INJECTION ONCE
Status: COMPLETED | OUTPATIENT
Start: 2023-10-22 | End: 2023-10-22

## 2023-10-22 RX ORDER — ACETAMINOPHEN 325 MG/1
650 TABLET ORAL EVERY 4 HOURS PRN
Status: CANCELLED | OUTPATIENT
Start: 2023-10-22

## 2023-10-22 RX ADMIN — LACOSAMIDE 100 MG: 10 INJECTION INTRAVENOUS at 18:22

## 2023-10-22 RX ADMIN — HYDROMORPHONE HYDROCHLORIDE 0.5 MG: 1 INJECTION, SOLUTION INTRAMUSCULAR; INTRAVENOUS; SUBCUTANEOUS at 16:47

## 2023-10-22 NOTE — ED PROVIDER NOTES
History  Chief Complaint   Patient presents with    Seizure - Prior Hx Of     Pt at [employer] when experienced a seizure, fell backwards and hit head, No thinners. Pt currently postictal. Hx of seizures but has not had one in awhile, pt off valporic acid for about one yr now. Mr. Rocio Elkins is a 37-year-old male with a past medical history of generalized seizure disorder who presents to the ED after having a seizure at [address] History obtained in conjunction with both patient and patient's wife. Reports that patient has not had a seizure for 6 to 7 years. Reports adherence to brivaracetam.  Reports that he was started on this in [facility], and despite trying other medications in the [address] would like to the best of his neurology team has maintained him on this medication. He is unaware of any specific trigger today. His wife is not with him at the time, so we are unsure of head strike, or vomiting. Patient is currently reporting difficulty lifting his right arm, and reports pain to the posterior aspect of his head/neck. Prior to Admission Medications   Prescriptions Last Dose Informant Patient Reported? Taking?    Multiple Vitamins-Minerals (CENTRUM SILVER PO)   Yes No   Sig: Take by mouth   OXcarbazepine (TRILEPTAL) 300 mg tablet   No No   Sig: Take 1 tablet (300 mg total) by mouth every 12 (twelve) hours   Valproate Sodium (VALPROIC ACID PO)   Yes No   Sig: Take 300 mg by mouth daily Medication filled in [facility]   Patient not taking: Reported on 12/30/2022      Facility-Administered Medications: None       Past Medical History:   Diagnosis Date    Generalized seizure disorder ([address])     Hip strain, left, subsequent encounter     Seizures ([address])     Shoulder strain, left, subsequent encounter        Past Surgical History:   Procedure Laterality Date    LITHOTRIPSY         Family History   Problem Relation Age of Onset    No Known Problems Mother     Hypertension Father     Diabetes Father     No Known Problems Brother      I have reviewed and agree with the history as documented. E-Cigarette/Vaping    E-Cigarette Use Never User      E-Cigarette/Vaping Substances    Nicotine No     THC No     CBD No     Flavoring No     Other No     Unknown No      Social History     Tobacco Use    Smoking status: Former    Smokeless tobacco: Never   Vaping Use    Vaping Use: Never used   Substance Use Topics    Alcohol use: Yes     Alcohol/week: 5.0 standard drinks of alcohol     Types: 5 Glasses of wine per week    Drug use: No        Review of Systems   Constitutional:  Negative for activity change, chills and fever. Eyes:  Negative for pain and visual disturbance. Respiratory:  Negative for chest tightness and shortness of breath. Cardiovascular:  Negative for chest pain. Gastrointestinal:  Negative for abdominal pain, constipation, diarrhea, nausea and vomiting. Genitourinary:  Negative for dysuria and hematuria. Musculoskeletal:  Positive for arthralgias. Skin:  Negative for rash and wound. Neurological:  Negative for dizziness, syncope, light-headedness and headaches. Psychiatric/Behavioral: Negative. Physical Exam  ED Triage Vitals   Temperature Pulse Respirations Blood Pressure SpO2   10/22/23 1451 10/22/23 1451 10/22/23 1451 10/22/23 1451 10/22/23 1451   98.6 °F (37 °C) (!) 113 20 139/86 96 %      Temp Source Heart Rate Source Patient Position - Orthostatic VS BP Location FiO2 (%)   10/22/23 1451 10/22/23 1451 -- 10/22/23 1451 --   Oral Monitor  Right arm       Pain Score       10/22/23 1647       8             Orthostatic Vital Signs  Vitals:    10/22/23 1451 10/22/23 1530 10/22/23 1600   BP: 139/86 132/89 135/90   Pulse: (!) 113 84 84       Physical Exam  Vitals and nursing note reviewed. Constitutional:       Appearance: Normal appearance. HENT:      Head: Normocephalic and atraumatic. Comments: Patient has tenderness over his neck and up into his posterior head.   No obvious lacerations, or external signs of injury. No obvious bony deformities felt. Right Ear: External ear normal.      Left Ear: External ear normal.      Nose: Nose normal.      Mouth/Throat:      Mouth: Mucous membranes are moist.      Pharynx: Oropharynx is clear. Eyes:      Extraocular Movements: Extraocular movements intact. Conjunctiva/sclera: Conjunctivae normal.   Neck:      Comments: Patient reports tenderness of his neck and up into his posterior head. No obvious step-off or deformity felt. Cardiovascular:      Rate and Rhythm: Normal rate and regular rhythm. Heart sounds: Normal heart sounds. Pulmonary:      Effort: Pulmonary effort is normal.      Breath sounds: Normal breath sounds. Abdominal:      General: Abdomen is flat. There is no distension. Palpations: Abdomen is soft. Tenderness: There is no abdominal tenderness. Musculoskeletal:      Cervical back: Tenderness present. Comments: Patient reports pain in his right shoulder and difficulty lifting his right arm, unable to elicit range of motion due to patient not wishing to move the arm. No obvious bruising or deformity overlying the shoulder. Patient denies pain of the elbow, forearm, or wrist.   Skin:     General: Skin is warm and dry. Neurological:      General: No focal deficit present. Mental Status: He is alert and oriented to person, place, and time.    Psychiatric:         Mood and Affect: Mood normal.         Behavior: Behavior normal.         ED Medications  Medications   HYDROmorphone (DILAUDID) injection 0.5 mg (0.5 mg Intravenous Given 10/22/23 1647)       Diagnostic Studies  Results Reviewed       Procedure Component Value Units Date/Time    Comprehensive metabolic panel [[id number]] Collected: 10/22/23 1619    Lab Status: Final result Specimen: Blood from Arm, Left Updated: 10/22/23 1651     Sodium 139 mmol/L      Potassium 3.8 mmol/L      Chloride 103 mmol/L      CO2 23 mmol/L      ANION GAP 13 mmol/L      BUN 9 mg/dL      Creatinine 0.95 mg/dL      Glucose 94 mg/dL      Calcium 9.6 mg/dL      AST 38 U/L      ALT 43 U/L      Alkaline Phosphatase 67 U/L      Total Protein 7.6 g/dL      Albumin 4.9 g/dL      Total Bilirubin 0.38 mg/dL      eGFR 99 ml/min/1.73sq m     Narrative:      National Kidney Disease Foundation guidelines for Chronic Kidney Disease (CKD):     Stage 1 with normal or high GFR (GFR > 90 mL/min/1.73 square meters)    Stage 2 Mild CKD (GFR = 60-89 mL/min/1.73 square meters)    Stage 3A Moderate CKD (GFR = 45-59 mL/min/1.73 square meters)    Stage 3B Moderate CKD (GFR = 30-44 mL/min/1.73 square meters)    Stage 4 Severe CKD (GFR = 15-29 mL/min/1.73 square meters)    Stage 5 End Stage CKD (GFR <15 mL/min/1.73 square meters)  Note: GFR calculation is accurate only with a steady state creatinine    CBC and differential [[id number]] Collected: 10/22/23 1619    Lab Status: Final result Specimen: Blood from Arm, Left Updated: 10/22/23 1639     WBC 9.80 Thousand/uL      RBC 4.65 Million/uL      Hemoglobin 13.6 g/dL      Hematocrit 41.8 %      MCV 90 fL      MCH 29.2 pg      MCHC 32.5 g/dL      RDW 13.2 %      MPV 11.4 fL      Platelets 793 Thousands/uL      nRBC 0 /100 WBCs      Neutrophils Relative 51 %      Immat GRANS % 1 %      Lymphocytes Relative 36 %      Monocytes Relative 9 %      Eosinophils Relative 3 %      Basophils Relative 0 %      Neutrophils Absolute 4.92 Thousands/µL      Immature Grans Absolute 0.12 Thousand/uL      Lymphocytes Absolute 3.50 Thousands/µL      Monocytes Absolute 0.90 Thousand/µL      Eosinophils Absolute 0.33 Thousand/µL      Basophils Absolute 0.03 Thousands/µL     Brivaracetam [[id number]] Collected: 10/22/23 1619    Lab Status:  In process Specimen: Blood from Arm, Left Updated: 10/22/23 1632                   CT head without contrast    (Results Pending)   CT cervical spine without contrast    (Results Pending)         Procedures  Procedures      ED Course Medical Decision Making  Mr. Charlotte Doyle is a 22-year-old male with a past medical history of generalized seizure disorder who presents to the ED after having a seizure at [address] Patient placed in cervical collar to stabilize any potential neck injury. Head and neck CT:" No acute intracranial abnormality." No cervical spine fracture or traumatic malalignment."  Shoulder XR: No acute fractures, or dislocations of the right shoulder seen on wet read. Labs: Unremarkable, infectious cause unlikely. Blood cultures pending at time patient decided to leave. Reached out to neurology for recommendations, they recommend the patient be admitted to be observed for further seizures and to begin anticonvulsant medication. Patient had a conversation with Dr. Della Gilbert in regards to wishing to leave the hospital.  Patient agreed warned the patient of the risk of further seizures at home, specially considering he has not been on anticonvulsant medication in several months. Patient states that he understands the risks but wished to go home and contact his "[place] neurologist" in the morning. CT, lab, and xray results shared with patient. Return precautions given and patient expresses understanding and is comfortable with discharge. Amount and/or Complexity of Data Reviewed  Labs: ordered. Radiology: ordered and independent interpretation performed. Risk  Prescription drug management. Disposition  Final diagnoses:   None     ED Disposition       None          Follow-up Information    None         Patient's Medications   Discharge Prescriptions    No medications on file     No discharge procedures on file. PDMP Review       None             ED Provider  Attending physically available and evaluated Ramona Knowles. I managed the patient along with the ED Attending.     Electronically Signed by           Amarjit Johnson MD  10/23/23 0022       Amarjit Johnson MD  10/23/23 0023

## 2023-10-22 NOTE — ED ATTENDING ATTESTATION
10/22/2023  I, Aleksander Worthington MD, saw and evaluated the patient. I have discussed the patient with the resident/non-physician practitioner and agree with the resident's/non-physician practitioner's findings, Plan of Care, and MDM as documented in the resident's/non-physician practitioner's note, except where noted. All available labs and Radiology studies were reviewed. I was present for key portions of any procedure(s) performed by the resident/non-physician practitioner and I was immediately available to provide assistance. At this point I agree with the current assessment done in the Emergency Department. I have conducted an independent evaluation of this patient a history and physical is as follows: This is a 46-year-old male patient with a relevant past medical history of seizure disorder, on Bravetiracetam up until 3 months ago when he was taken off by his neurologist, after being weaned down presenting to the ED today for a seizure. Patient states that this is his first seizure in the past 7 years. He denies any preceding symptoms, and does state that he fell back, and hit his head. He at the time of presentation was postictal, but did return to his normal mental state quite rapidly. His exam otherwise was unremarkable. His differential diagnosis includes: Electrolyte abnormality versus syncope with myoclonus versus seizure versus other. CBC, metabolic panel, CT of his head, C-spine did not show any significant abnormalities. His EKG was unremarkable as well. Patient did have some right shoulder pain, however did not have any significant abnormalities on my physical exam, nonetheless underwent x-rays of his right shoulder showing no evidence of fracture or dislocation. His CT of the head, C-spine did not show any significant abnormalities either.   We did speak with neurology, whom recommended hospitalizing the patient, placing him on Vimpat, and after receiving IV Vimpat, patient states that he would like to go home, follow-up with his neurologist in [facility], and take his Ranjith seizure medication since "American medications do not agree with his body". I did discuss with patient that he would be leaving [address] [place] [address], and was at risk to have further seizures at home, especially considering that he has not taking any medications here from us. I did offer him a prescription which she declined, and stated that he had enough of his medications at home from [facility] which he will take. Patient then was allowed to leave [address] [place] [address], on same mind and body. The management plan was discussed in detail with the patient at bedside and all questions were answered. Strict ED return instructions were discussed at bedside. Prior to discharge, both verbal and written instructions were provided. We discussed the signs and symptoms that should prompt the patient to return to the ED. All questions were answered and the patient was comfortable with the plan of care and discharged home. The patient agrees to return to the Emergency Department for concerns and/or progression of illness.     ED Course         Critical Care Time  Procedures

## 2023-10-22 NOTE — DISCHARGE INSTRUCTIONS
You were seen in the ED for a seizure. You had bloodwork, EKG, all showing no significant abnormalities. Please follow up with your primary care physician and/or neurologist within the next 1-2 weeks for continued management of your condition. Please come back to the ED if your symptoms return or worsen or if you develop uncontrollable pain, shortness of breath, loss of consciousness, or uncontrollable seizures. Thank you very much for utilizing the ED this evening.

## 2023-10-23 LAB
ATRIAL RATE: 93 BPM
P AXIS: 62 DEGREES
PR INTERVAL: 138 MS
QRS AXIS: 16 DEGREES
QRSD INTERVAL: 90 MS
QT INTERVAL: 358 MS
QTC INTERVAL: 445 MS
T WAVE AXIS: 56 DEGREES
VENTRICULAR RATE: 93 BPM

## 2023-10-23 PROCEDURE — 93010 ELECTROCARDIOGRAM REPORT: CPT | Performed by: INTERNAL MEDICINE

## 2023-10-31 LAB — BRIVARACETAM SERPL-MCNC: <0.05 UG/ML (ref 0.2–2)

## 2024-09-20 ENCOUNTER — APPOINTMENT (EMERGENCY)
Dept: CT IMAGING | Facility: HOSPITAL | Age: 44
DRG: 394 | End: 2024-09-20
Payer: COMMERCIAL

## 2024-09-20 ENCOUNTER — HOSPITAL ENCOUNTER (INPATIENT)
Facility: HOSPITAL | Age: 44
LOS: 1 days | Discharge: HOME | DRG: 394 | End: 2024-09-22
Attending: EMERGENCY MEDICINE | Admitting: INTERNAL MEDICINE
Payer: COMMERCIAL

## 2024-09-20 DIAGNOSIS — R55 SYNCOPE: ICD-10-CM

## 2024-09-20 DIAGNOSIS — K92.1 HEMATOCHEZIA: Primary | ICD-10-CM

## 2024-09-20 DIAGNOSIS — N13.30 HYDRONEPHROSIS OF RIGHT KIDNEY: ICD-10-CM

## 2024-09-20 DIAGNOSIS — D64.9 ANEMIA: ICD-10-CM

## 2024-09-20 DIAGNOSIS — K21.9 GERD (GASTROESOPHAGEAL REFLUX DISEASE): ICD-10-CM

## 2024-09-20 DIAGNOSIS — N20.0 STAGHORN KIDNEY STONES: ICD-10-CM

## 2024-09-20 DIAGNOSIS — K62.5 BRBPR (BRIGHT RED BLOOD PER RECTUM): ICD-10-CM

## 2024-09-20 LAB
ABO GROUP BLD: NORMAL
ALBUMIN SERPL BCG-MCNC: 4.2 G/DL (ref 3.5–5)
ALP SERPL-CCNC: 42 U/L (ref 34–104)
ALT SERPL W P-5'-P-CCNC: 51 U/L (ref 7–52)
ANION GAP SERPL CALCULATED.3IONS-SCNC: 10 MMOL/L (ref 4–13)
APTT PPP: 24 SECONDS (ref 23–34)
AST SERPL W P-5'-P-CCNC: 40 U/L (ref 13–39)
BASOPHILS # BLD AUTO: 0.03 THOUSANDS/ΜL (ref 0–0.1)
BASOPHILS NFR BLD AUTO: 0 % (ref 0–1)
BILIRUB SERPL-MCNC: 0.52 MG/DL (ref 0.2–1)
BLD GP AB SCN SERPL QL: NEGATIVE
BUN SERPL-MCNC: 13 MG/DL (ref 5–25)
CALCIUM SERPL-MCNC: 9.2 MG/DL (ref 8.4–10.2)
CHLORIDE SERPL-SCNC: 105 MMOL/L (ref 96–108)
CO2 SERPL-SCNC: 24 MMOL/L (ref 21–32)
CREAT SERPL-MCNC: 1.03 MG/DL (ref 0.6–1.3)
D DIMER PPP FEU-MCNC: <0.27 UG/ML FEU
EOSINOPHIL # BLD AUTO: 0.22 THOUSAND/ΜL (ref 0–0.61)
EOSINOPHIL NFR BLD AUTO: 3 % (ref 0–6)
ERYTHROCYTE [DISTWIDTH] IN BLOOD BY AUTOMATED COUNT: 12.8 % (ref 11.6–15.1)
EXT FECAL OCCULT BLOOD SCREEN: POSITIVE
EXT. CONTROL: ABNORMAL
GFR SERPL CREATININE-BSD FRML MDRD: 89 ML/MIN/1.73SQ M
GLUCOSE SERPL-MCNC: 153 MG/DL (ref 65–140)
HCT VFR BLD AUTO: 35.2 % (ref 36.5–49.3)
HGB BLD-MCNC: 11.5 G/DL (ref 12–17)
IMM GRANULOCYTES # BLD AUTO: 0.05 THOUSAND/UL (ref 0–0.2)
IMM GRANULOCYTES NFR BLD AUTO: 1 % (ref 0–2)
INR PPP: 0.94 (ref 0.85–1.19)
LIPASE SERPL-CCNC: 42 U/L (ref 11–82)
LYMPHOCYTES # BLD AUTO: 2.17 THOUSANDS/ΜL (ref 0.6–4.47)
LYMPHOCYTES NFR BLD AUTO: 31 % (ref 14–44)
MCH RBC QN AUTO: 29.9 PG (ref 26.8–34.3)
MCHC RBC AUTO-ENTMCNC: 32.7 G/DL (ref 31.4–37.4)
MCV RBC AUTO: 92 FL (ref 82–98)
MONOCYTES # BLD AUTO: 0.43 THOUSAND/ΜL (ref 0.17–1.22)
MONOCYTES NFR BLD AUTO: 6 % (ref 4–12)
NEUTROPHILS # BLD AUTO: 4.09 THOUSANDS/ΜL (ref 1.85–7.62)
NEUTS SEG NFR BLD AUTO: 59 % (ref 43–75)
NRBC BLD AUTO-RTO: 0 /100 WBCS
PLATELET # BLD AUTO: 208 THOUSANDS/UL (ref 149–390)
PMV BLD AUTO: 10.5 FL (ref 8.9–12.7)
POTASSIUM SERPL-SCNC: 3.5 MMOL/L (ref 3.5–5.3)
PROT SERPL-MCNC: 6.2 G/DL (ref 6.4–8.4)
PROTHROMBIN TIME: 13.3 SECONDS (ref 12.3–15)
RBC # BLD AUTO: 3.84 MILLION/UL (ref 3.88–5.62)
RH BLD: POSITIVE
SODIUM SERPL-SCNC: 139 MMOL/L (ref 135–147)
SPECIMEN EXPIRATION DATE: NORMAL
WBC # BLD AUTO: 6.99 THOUSAND/UL (ref 4.31–10.16)

## 2024-09-20 PROCEDURE — 82272 OCCULT BLD FECES 1-3 TESTS: CPT

## 2024-09-20 PROCEDURE — 96365 THER/PROPH/DIAG IV INF INIT: CPT

## 2024-09-20 PROCEDURE — 86900 BLOOD TYPING SEROLOGIC ABO: CPT | Performed by: EMERGENCY MEDICINE

## 2024-09-20 PROCEDURE — 83690 ASSAY OF LIPASE: CPT | Performed by: EMERGENCY MEDICINE

## 2024-09-20 PROCEDURE — 74178 CT ABD&PLV WO CNTR FLWD CNTR: CPT

## 2024-09-20 PROCEDURE — 93005 ELECTROCARDIOGRAM TRACING: CPT

## 2024-09-20 PROCEDURE — 36415 COLL VENOUS BLD VENIPUNCTURE: CPT | Performed by: EMERGENCY MEDICINE

## 2024-09-20 PROCEDURE — 85025 COMPLETE CBC W/AUTO DIFF WBC: CPT | Performed by: EMERGENCY MEDICINE

## 2024-09-20 PROCEDURE — 80053 COMPREHEN METABOLIC PANEL: CPT | Performed by: EMERGENCY MEDICINE

## 2024-09-20 PROCEDURE — 85379 FIBRIN DEGRADATION QUANT: CPT | Performed by: EMERGENCY MEDICINE

## 2024-09-20 PROCEDURE — 85730 THROMBOPLASTIN TIME PARTIAL: CPT | Performed by: EMERGENCY MEDICINE

## 2024-09-20 PROCEDURE — 86850 RBC ANTIBODY SCREEN: CPT | Performed by: EMERGENCY MEDICINE

## 2024-09-20 PROCEDURE — 99285 EMERGENCY DEPT VISIT HI MDM: CPT

## 2024-09-20 PROCEDURE — 85610 PROTHROMBIN TIME: CPT | Performed by: EMERGENCY MEDICINE

## 2024-09-20 PROCEDURE — 99285 EMERGENCY DEPT VISIT HI MDM: CPT | Performed by: EMERGENCY MEDICINE

## 2024-09-20 PROCEDURE — 86901 BLOOD TYPING SEROLOGIC RH(D): CPT | Performed by: EMERGENCY MEDICINE

## 2024-09-20 RX ADMIN — SODIUM CHLORIDE, SODIUM LACTATE, POTASSIUM CHLORIDE, AND CALCIUM CHLORIDE 500 ML: .6; .31; .03; .02 INJECTION, SOLUTION INTRAVENOUS at 22:19

## 2024-09-20 RX ADMIN — IOHEXOL 100 ML: 350 INJECTION, SOLUTION INTRAVENOUS at 22:59

## 2024-09-21 PROBLEM — K92.1 HEMATOCHEZIA: Status: ACTIVE | Noted: 2024-09-21

## 2024-09-21 PROBLEM — N13.30 HYDRONEPHROSIS OF RIGHT KIDNEY: Status: ACTIVE | Noted: 2024-09-21

## 2024-09-21 PROBLEM — R55 SYNCOPE: Status: ACTIVE | Noted: 2024-09-21

## 2024-09-21 LAB
ABO GROUP BLD: NORMAL
ANION GAP SERPL CALCULATED.3IONS-SCNC: 7 MMOL/L (ref 4–13)
ATRIAL RATE: 85 BPM
BACTERIA UR QL AUTO: ABNORMAL /HPF
BASOPHILS # BLD AUTO: 0.02 THOUSANDS/ΜL (ref 0–0.1)
BASOPHILS NFR BLD AUTO: 0 % (ref 0–1)
BILIRUB UR QL STRIP: NEGATIVE
BUN SERPL-MCNC: 11 MG/DL (ref 5–25)
CALCIUM SERPL-MCNC: 8.8 MG/DL (ref 8.4–10.2)
CHLORIDE SERPL-SCNC: 104 MMOL/L (ref 96–108)
CLARITY UR: CLEAR
CO2 SERPL-SCNC: 27 MMOL/L (ref 21–32)
COLOR UR: COLORLESS
CREAT SERPL-MCNC: 0.89 MG/DL (ref 0.6–1.3)
EOSINOPHIL # BLD AUTO: 0.19 THOUSAND/ΜL (ref 0–0.61)
EOSINOPHIL NFR BLD AUTO: 2 % (ref 0–6)
ERYTHROCYTE [DISTWIDTH] IN BLOOD BY AUTOMATED COUNT: 12.9 % (ref 11.6–15.1)
GFR SERPL CREATININE-BSD FRML MDRD: 105 ML/MIN/1.73SQ M
GLUCOSE SERPL-MCNC: 111 MG/DL (ref 65–140)
GLUCOSE UR STRIP-MCNC: NEGATIVE MG/DL
HCT VFR BLD AUTO: 32.2 % (ref 36.5–49.3)
HGB BLD-MCNC: 10.7 G/DL (ref 12–17)
HGB UR QL STRIP.AUTO: ABNORMAL
IMM GRANULOCYTES # BLD AUTO: 0.05 THOUSAND/UL (ref 0–0.2)
IMM GRANULOCYTES NFR BLD AUTO: 1 % (ref 0–2)
KETONES UR STRIP-MCNC: NEGATIVE MG/DL
LEUKOCYTE ESTERASE UR QL STRIP: ABNORMAL
LYMPHOCYTES # BLD AUTO: 1.95 THOUSANDS/ΜL (ref 0.6–4.47)
LYMPHOCYTES NFR BLD AUTO: 23 % (ref 14–44)
MCH RBC QN AUTO: 30.1 PG (ref 26.8–34.3)
MCHC RBC AUTO-ENTMCNC: 33.2 G/DL (ref 31.4–37.4)
MCV RBC AUTO: 90 FL (ref 82–98)
MONOCYTES # BLD AUTO: 0.46 THOUSAND/ΜL (ref 0.17–1.22)
MONOCYTES NFR BLD AUTO: 5 % (ref 4–12)
NEUTROPHILS # BLD AUTO: 5.86 THOUSANDS/ΜL (ref 1.85–7.62)
NEUTS SEG NFR BLD AUTO: 69 % (ref 43–75)
NITRITE UR QL STRIP: NEGATIVE
NON-SQ EPI CELLS URNS QL MICRO: ABNORMAL /HPF
NRBC BLD AUTO-RTO: 0 /100 WBCS
P AXIS: 63 DEGREES
PH UR STRIP.AUTO: 6 [PH]
PLATELET # BLD AUTO: 195 THOUSANDS/UL (ref 149–390)
PMV BLD AUTO: 10.9 FL (ref 8.9–12.7)
POTASSIUM SERPL-SCNC: 4.2 MMOL/L (ref 3.5–5.3)
PR INTERVAL: 138 MS
PROT UR STRIP-MCNC: NEGATIVE MG/DL
QRS AXIS: 46 DEGREES
QRSD INTERVAL: 92 MS
QT INTERVAL: 370 MS
QTC INTERVAL: 440 MS
RBC # BLD AUTO: 3.56 MILLION/UL (ref 3.88–5.62)
RBC #/AREA URNS AUTO: ABNORMAL /HPF
RH BLD: POSITIVE
SODIUM SERPL-SCNC: 138 MMOL/L (ref 135–147)
SP GR UR STRIP.AUTO: 1.01 (ref 1–1.03)
T WAVE AXIS: 17 DEGREES
UROBILINOGEN UR STRIP-ACNC: <2 MG/DL
VENTRICULAR RATE: 85 BPM
WBC # BLD AUTO: 8.53 THOUSAND/UL (ref 4.31–10.16)
WBC #/AREA URNS AUTO: ABNORMAL /HPF

## 2024-09-21 PROCEDURE — 99222 1ST HOSP IP/OBS MODERATE 55: CPT | Performed by: INTERNAL MEDICINE

## 2024-09-21 PROCEDURE — NC001 PR NO CHARGE: Performed by: UROLOGY

## 2024-09-21 PROCEDURE — 93010 ELECTROCARDIOGRAM REPORT: CPT | Performed by: INTERNAL MEDICINE

## 2024-09-21 PROCEDURE — 99222 1ST HOSP IP/OBS MODERATE 55: CPT | Performed by: UROLOGY

## 2024-09-21 PROCEDURE — 99223 1ST HOSP IP/OBS HIGH 75: CPT | Performed by: HOSPITALIST

## 2024-09-21 PROCEDURE — 81001 URINALYSIS AUTO W/SCOPE: CPT

## 2024-09-21 PROCEDURE — 80048 BASIC METABOLIC PNL TOTAL CA: CPT

## 2024-09-21 PROCEDURE — 36415 COLL VENOUS BLD VENIPUNCTURE: CPT

## 2024-09-21 PROCEDURE — 85025 COMPLETE CBC W/AUTO DIFF WBC: CPT

## 2024-09-21 RX ORDER — HYDROCORTISONE 25 MG/G
CREAM TOPICAL 2 TIMES DAILY
Status: DISCONTINUED | OUTPATIENT
Start: 2024-09-21 | End: 2024-09-22 | Stop reason: HOSPADM

## 2024-09-21 RX ORDER — PANTOPRAZOLE SODIUM 40 MG/10ML
40 INJECTION, POWDER, LYOPHILIZED, FOR SOLUTION INTRAVENOUS
Status: DISCONTINUED | OUTPATIENT
Start: 2024-09-21 | End: 2024-09-21

## 2024-09-21 RX ORDER — ENOXAPARIN SODIUM 100 MG/ML
40 INJECTION SUBCUTANEOUS DAILY
Status: DISCONTINUED | OUTPATIENT
Start: 2024-09-21 | End: 2024-09-22 | Stop reason: HOSPADM

## 2024-09-21 RX ORDER — ACETAMINOPHEN 325 MG/1
650 TABLET ORAL EVERY 6 HOURS PRN
Status: DISCONTINUED | OUTPATIENT
Start: 2024-09-21 | End: 2024-09-22 | Stop reason: HOSPADM

## 2024-09-21 RX ORDER — SODIUM CHLORIDE, SODIUM LACTATE, POTASSIUM CHLORIDE, CALCIUM CHLORIDE 600; 310; 30; 20 MG/100ML; MG/100ML; MG/100ML; MG/100ML
75 INJECTION, SOLUTION INTRAVENOUS CONTINUOUS
Status: DISCONTINUED | OUTPATIENT
Start: 2024-09-21 | End: 2024-09-21

## 2024-09-21 RX ORDER — PANTOPRAZOLE SODIUM 40 MG/1
40 TABLET, DELAYED RELEASE ORAL
Status: DISCONTINUED | OUTPATIENT
Start: 2024-09-22 | End: 2024-09-22 | Stop reason: HOSPADM

## 2024-09-21 RX ORDER — VALPROIC ACID 250 MG/1
250 CAPSULE, LIQUID FILLED ORAL DAILY
Status: DISCONTINUED | OUTPATIENT
Start: 2024-09-21 | End: 2024-09-21

## 2024-09-21 RX ADMIN — PANTOPRAZOLE SODIUM 40 MG: 40 INJECTION, POWDER, FOR SOLUTION INTRAVENOUS at 15:08

## 2024-09-21 RX ADMIN — SODIUM CHLORIDE, SODIUM LACTATE, POTASSIUM CHLORIDE, AND CALCIUM CHLORIDE 75 ML/HR: .6; .31; .03; .02 INJECTION, SOLUTION INTRAVENOUS at 04:15

## 2024-09-21 RX ADMIN — HYDROCORTISONE 2.5%: 25 CREAM TOPICAL at 09:29

## 2024-09-21 RX ADMIN — HYDROCORTISONE 2.5%: 25 CREAM TOPICAL at 20:50

## 2024-09-21 NOTE — ASSESSMENT & PLAN NOTE
Patient's left lower quadrant tenderness to deep palpation may be due to this existing problem, however

## 2024-09-21 NOTE — CONSULTS
Consultation -  [facility]  Marjorie Castro 44 y.o. male MRN: [id number]  Unit/Bed#: ED-32 Encounter: [id number]        Inpatient consult to gastroenterology  Consult performed by: MONSE Rodriges  Consult ordered by: Tasha Rios MD          Reason for Consult / Principal Problem:     Rectal bleeding      ASSESSMENT AND PLAN:      This is a 44-year-old male with history of seizures who presented 9/20 due to rectal bleeding, reported 15-20 episodes of bright red blood and clots per rectum prior to syncopized and at home prompting presentation.  In the ER, hemoglobin 11.5 from baseline of 13.6 in October 2023, CT negative for active bleed, notable for mild sigmoid and rectal wall thickening at least partially accentuated by underdistention.  WBC normal, abdominal exam benign.  He had recent colonoscopy 8/30/2024 with polypectomy, diverticulosis in the cecum, and prolapsed internal hemorrhoids which were banded.    #1  Rectal bleeding-Painless rectal bleeding with bright red blood/clots and drop in hemoglobin with syncopal episode most concerning for possible self-limited diverticular bleed vs colitis, or rectal ulcer from banding last month. He reports bleeding stopped last night, hemoglobin 10.7 this morning from 11.5 last night.  He remains hemodynamically stable.    -Okay for clear liquid diet  -Monitor serial H&H, stool output  -Transfuse blood products as needed  -If recurrent bleeding please let GI know and then we will plan for repeat colonoscopy  -If hemoglobin continues to remain stable with no further bleeding, then no repeat colonoscopy is needed  -Agree with stool studies given recent travel, possible proctocolitis on CT    Thank you for the consultation.  Case will be discussed with Dr. Hansen.  Spoke to patient's personal gastroenterologist      ______________________________________________________________________    HPI:  Marjorie Castro is a 44 y.o. male with history of seizures  who presented 9/20 due to rectal bleeding. He reported coming back on a flight from [place] yesterday morning and around 11 AM he began having multiple episodes of bright red blood and clots coming from his rectum, approximately 15-20 small volume bowel movements until presentation to the ED around 8 PM due to subsequent syncopal episode.    In the ED, HR 90s, SBP 100s-120s. Hgb 11.5 from baseline 13.6 in October 2023, AST 40, WBC WNL. CT high volume GI bleed scan showed mild sigmoid and rectal wall thickening at least partially accentuated by under distention but concerning for coloproctitis. No evidence of high volume GI bleeding. Moderate right renal pelvocaliectasis; associated with staghorn calculus suggests this is chronic and related to recurrent infections. UA, stool studies were ordered.     Hgb this AM stable 10.7. He hasn't had any more BM since last night.     Pt had recent colonoscopy at [facility] by Dr. Price 8/30/24-  Impression: - Hemorrhoids found on perianal exam.  - One 3 mm polyp in the cecum, removed with a cold biopsy forceps. Resected and  retrieved.  - The examined portion of the ileum was normal.  - Diverticulosis in the cecum.  - Prolapsed internal hemorrhoids. Banded.      REVIEW OF SYSTEMS:    CONSTITUTIONAL: Denies any fever, chills, rigors, and weight loss.  HEENT: No earache or tinnitus. Denies hearing loss or visual disturbances.  CARDIOVASCULAR: No chest pain or palpitations.   RESPIRATORY: Denies any cough, hemoptysis, shortness of breath or dyspnea on exertion.  GASTROINTESTINAL: As noted in the History of Present Illness.   GENITOURINARY: No problems with urination. Denies any hematuria or dysuria.  NEUROLOGIC: No dizziness or vertigo, denies headaches.   MUSCULOSKELETAL: Denies any muscle or joint pain.   SKIN: Denies skin rashes or itching.   ENDOCRINE: Denies excessive thirst. Denies intolerance to heat or cold.  PSYCHOSOCIAL: Denies depression or anxiety. Denies any  recent memory loss.       Historical Information   Past Medical History:   Diagnosis Date    Generalized seizure disorder (HCC)     Hip strain, left, subsequent encounter     Seizures (HCC)     Shoulder strain, left, subsequent encounter      Past Surgical History:   Procedure Laterality Date    LITHOTRIPSY       Social History   Social History     Substance and Sexual Activity   Alcohol Use Yes    Alcohol/week: 5.0 standard drinks of alcohol    Types: 5 Glasses of wine per week     Social History     Substance and Sexual Activity   Drug Use No     Social History     Tobacco Use   Smoking Status Former   Smokeless Tobacco Never     Family History   Problem Relation Age of Onset    No Known Problems Mother     Hypertension Father     Diabetes Father     No Known Problems Brother        Meds/Allergies     Not in a hospital admission.    Current Facility-Administered Medications:     acetaminophen (TYLENOL) tablet 650 mg, Q6H PRN    enoxaparin (LOVENOX) subcutaneous injection 40 mg, Daily    hydrocortisone (ANUSOL-HC) 2.5 % rectal cream, BID    lactated ringers infusion, Continuous, Last Rate: 75 mL/hr (09/21/24 0415)    valproic acid (DEPAKENE) capsule 250 mg, Daily    No Known Allergies        Objective     Blood pressure 118/88, pulse 68, temperature 97.7 °F (36.5 °C), temperature source Oral, resp. rate 16, SpO2 98%. There is no height or weight on file to calculate BMI.      Intake/Output Summary (Last 24 hours) at 9/21/2024 0841  Last data filed at 9/20/2024 2314  Gross per 24 hour   Intake 500 ml   Output --   Net 500 ml         PHYSICAL EXAM:      General Appearance:   Alert, cooperative, no distress   HEENT:   Normocephalic, atraumatic, anicteric.     Neck:  Supple, symmetrical, trachea midline   Lungs:   Clear to auscultation bilaterally; no rales, rhonchi or wheezing; respirations unlabored    Heart::   Regular rate and rhythm; no murmur, rub, or gallop.   Abdomen:   Soft, non-tender, non-distended; normal  bowel sounds; no masses, no organomegaly    Genitalia:   Deferred    Rectal:   Deferred    Extremities:  No cyanosis, clubbing or edema    Pulses:  2+ and symmetric all extremities    Skin:  No jaundice, rashes, or lesions    Lymph nodes:  No palpable cervical lymphadenopathy        Lab Results:   Admission on 09/20/2024   Component Date Value    Ventricular Rate 09/20/2024 85     Atrial Rate 09/20/2024 85     AZ Interval 09/20/2024 138     QRSD Interval 09/20/2024 92     QT Interval 09/20/2024 370     QTC Interval 09/20/2024 440     P Axis 09/20/2024 63     QRS [place] 09/20/2024 46     T Wave [place] 09/20/2024 17     WBC 09/20/2024 6.99     RBC 09/20/2024 3.84 (L)     Hemoglobin 09/20/2024 11.5 (L)     Hematocrit 09/20/2024 35.2 (L)     MCV 09/20/2024 92     MCH 09/20/2024 29.9     MCHC 09/20/2024 32.7     RDW 09/20/2024 12.8     MPV 09/20/2024 10.5     Platelets 09/20/2024 208     nRBC 09/20/2024 0     Segmented % 09/20/2024 59     Immature Grans % 09/20/2024 1     Lymphocytes % 09/20/2024 31     Monocytes % 09/20/2024 6     Eosinophils Relative 09/20/2024 3     Basophils Relative 09/20/2024 0     Absolute Neutrophils 09/20/2024 4.09     Absolute Immature Grans 09/20/2024 0.05     Absolute Lymphocytes 09/20/2024 2.17     Absolute Monocytes 09/20/2024 0.43     Eosinophils Absolute 09/20/2024 0.22     Basophils Absolute 09/20/2024 0.03     Sodium 09/20/2024 139     Potassium 09/20/2024 3.5     Chloride 09/20/2024 105     CO2 09/20/2024 24     ANION GAP 09/20/2024 10     BUN 09/20/2024 13     Creatinine 09/20/2024 1.03     Glucose 09/20/2024 153 (H)     Calcium 09/20/2024 9.2     AST 09/20/2024 40 (H)     ALT 09/20/2024 51     Alkaline Phosphatase 09/20/2024 42     Total Protein 09/20/2024 6.2 (L)     Albumin 09/20/2024 4.2     Total Bilirubin 09/20/2024 0.52     eGFR 09/20/2024 89     Lipase 09/20/2024 42     ABO Grouping 09/20/2024 B     Rh Factor 09/20/2024 Positive     Antibody Screen 09/20/2024 Negative     Specimen  Expiration Date 09/20/2024 [id number]     D-Dimer, Quant 09/20/2024 <0.27     Protime 09/20/2024 13.3     INR 09/20/2024 0.94     PTT 09/20/2024 24     ABO Grouping 09/21/2024 B     Rh Factor 09/21/2024 Positive     EXT Fecal Occult Blood 09/20/2024 Positive (A)     Control 09/20/2024 Valid     Sodium 09/21/2024 138     Potassium 09/21/2024 4.2     Chloride 09/21/2024 104     CO2 09/21/2024 27     ANION GAP 09/21/2024 7     BUN 09/21/2024 11     Creatinine 09/21/2024 0.89     Glucose 09/21/2024 111     Calcium 09/21/2024 8.8     eGFR 09/21/2024 105     WBC 09/21/2024 8.53     RBC 09/21/2024 3.56 (L)     Hemoglobin 09/21/2024 10.7 (L)     Hematocrit 09/21/2024 32.2 (L)     MCV 09/21/2024 90     MCH 09/21/2024 30.1     MCHC 09/21/2024 33.2     RDW 09/21/2024 12.9     MPV 09/21/2024 10.9     Platelets 09/21/2024 195     nRBC 09/21/2024 0     Segmented % 09/21/2024 69     Immature Grans % 09/21/2024 1     Lymphocytes % 09/21/2024 23     Monocytes % 09/21/2024 5     Eosinophils Relative 09/21/2024 2     Basophils Relative 09/21/2024 0     Absolute Neutrophils 09/21/2024 5.86     Absolute Immature Grans 09/21/2024 0.05     Absolute Lymphocytes 09/21/2024 1.95     Absolute Monocytes 09/21/2024 0.46     Eosinophils Absolute 09/21/2024 0.19     Basophils Absolute 09/21/2024 0.02        Imaging Studies: I have personally reviewed pertinent imaging studies.

## 2024-09-21 NOTE — ED NOTES
"Pt utilizing good, states \" doctor was just here saying I will be getting colonoscopy, should I be eating if I will be getting colonoscopy\".  Advised this RN will reach out to slim.  Secure chat sent to [facility] resident.  Facilitated ADL's for pt, brush teeth with own supplies.      Thiago Harmon RN  09/21/24 [id number]    "

## 2024-09-21 NOTE — ASSESSMENT & PLAN NOTE
Secondary to right staghorn calculus.    Without evidence of toxicity, acute kidney injury or renal colic.  Incidental finding.    Plan:  Medical optimization and treatment for primary admission concern  Trend labs and observe for colicky symptomology.  Outpatient follow-up to discuss elective stone treatment.  No indication for acute  surgical instrumentation.  Will only consider ureteral stent or PCN should patient develop fever, chills, flank pain, or ROCAEL.  Office will contact patient/caregiver with hospital follow-up appointment date and time once discharged.

## 2024-09-21 NOTE — ASSESSMENT & PLAN NOTE
Patient had syncopal episode over the toilet.  About 8 PM he felt sweaty and briefly lost consciousness while he was sitting.  Suspecting vasovagal given prodromal symptoms.  Other potential but less likely etiologies include orthostatic hypotension in the setting of low volume.    Plan:  Continue to monitor  Follow-up on orthostatic vitals  Gentle IVF hydration 75 cc/h for 10 hours

## 2024-09-21 NOTE — H&P
H&P - Hospitalist   Name: Marjorie Castro 44 y.o. male I MRN: [id number]  Unit/Bed#: ED-32 I Date of Admission: 9/20/2024   Date of Service: 9/21/2024 I Hospital Day: 0     Assessment & Plan  Hematochezia  Patient presenting with hematochezia since 11 AM, however no episodes since presentation to the ED around 8 PM.  Colonoscopy 8/30 revealed hemorrhoids on perianal exam, 3 mm polyp in the cecum, diverticulosis in the cecum, and prolapsed internal hemorrhoids were banded. 2 point hemoglobin drop 13-11.  Patient has had recent travel from [place], no sick contacts that he is aware.    Currently suspecting lower GI bleed secondary to internal hemorrhoids versus diverticulosis.  Reassuringly patient is currently hemodynamically stable and is not currently showing any overt signs of bleeding.    Plan:  Monitor H&H Q8 with hemoglobin goal of greater than 7  Continue to monitor consistency and frequency of bowel movements  Obtain stool sample, if possible, for enteric pathogen panel and infectious workup  Follow-up stool parasite and ova  Follow-up enteric bacterial panel  Consulted GI, appreciate recs  Gentle hydration 75isolyte for 10 hour  Follow-up CBC every morning  Hydronephrosis of right kidney  Staghorn stone with associated right-sided hydronephrosis visualized on abdominal CT. Patient's kidney function is reasuringly stable, and patient is not currently having flank pain or urinary symptoms, overall asymptomatic.    Plan:  Consult urology regarding stone and to set up potential outpatient follow-up  Follow-up BMP a.m.  Syncope  Patient had syncopal episode over the toilet.  About 8 PM he felt sweaty and briefly lost consciousness while he was sitting.  Suspecting vasovagal given prodromal symptoms.  Other potential but less likely etiologies include orthostatic hypotension in the setting of low volume.    Plan:  Continue to monitor  Follow-up on orthostatic vitals  Gentle IVF hydration 75 cc/h for 10  "hours  Generalized seizure disorder (HCC)  Continue patient valproate to 50 mg daily in the morning.    VTE Pharmacologic Prophylaxis:  Lovenox  Code Status:  full code level 1  Discussion with family: Updated  (friend and wife to come in the morning) at bedside.    Anticipated Length of Stay: Patient will be admitted on an observation basis with an anticipated length of stay of less than 2 midnights secondary to suspected GI bleed.    History of Present Illness   Chief Complaint: Bright red blood per rectum    Marjorie Castro is a 44 y.o. male with a PMH of epilepsy and anal fissure who presents with bright red blood per rectum and 2 episodes of syncope without trauma.  Patient came back on a flight from [facility] landing this morning.  Around 11 AM patient began having multiple episodes of \"dark and bright blood\" coming from his rectum, approximately 15-20 small volume bowel movements until presentation to the ED around 8 PM.  Patient states he has never had these episodes of blood per rectum in the past.  He reports he has not eaten or drank very well because p.o. intake seems to precipitate his bloody bowel movements.  Of note patient sees GI as an outpatient for painful anal fissure, he is status post colonoscopy 8/30.  Colonoscopy at that time revealed hemorrhoids on perianal exam, 3 mm polyp in the cecum, diverticulosis in the cecum, and prolapsed internal hemorrhoids were banded. Patient denies abdominal pain, hematemesis, nausea vomiting, anal pain, black stools, and sick contacts from [place].  Patient has also been feeling weak since 11 AM when these bowel movements began, he states he had an episode of \"feeling sweaty\" when he was on the toilet and he briefly lost consciousness.  He states that he remembers the entire event occurring around 8 PM, and there was no head strike or injury.  Upon presentation patient was hemodynamically stable, however soft pressures of 100s over 50s.  Patient is status " post 500 mL LR, upon my evaluation patient's blood pressure improved to 120s over 80s.    High volume CT a/p pending suspect diverticular bleed. Did return from [place] today after 15-20hr direct flight, D-dimer neg. T&S obtained, and consented for blood.     Review of Systems   Constitutional:  Positive for appetite change. Negative for chills and fever.   HENT:  Negative for ear pain and sore throat.    Eyes:  Negative for pain and visual disturbance.   Respiratory:  Negative for cough and shortness of breath.    Cardiovascular:  Negative for chest pain and palpitations.   Gastrointestinal:  Positive for anal bleeding, blood in stool and diarrhea. Negative for abdominal pain, rectal pain and vomiting.   Genitourinary:  Negative for dysuria and hematuria.   Musculoskeletal:  Negative for arthralgias and back pain.   Skin:  Negative for color change and rash.   Neurological:  Positive for syncope and weakness. Negative for seizures, light-headedness and headaches.   All other systems reviewed and are negative.      I have reviewed the patient's PMH, PSH, Social History, Family History, Meds, and Allergies  Social History:  Marital Status: /Civil Union   Patient Pre-hospital Living Situation: Home lives with his wife  Patient Pre-hospital Level of Mobility: walks  Patient Pre-hospital Diet Restrictions: None    Objective     Vitals:   Blood Pressure: 103/74 (09/21/24 0100)  Pulse: 72 (09/21/24 0100)  Temperature: 97.7 °F (36.5 °C) (09/20/24 2107)  Temp Source: Oral (09/20/24 2107)  Respirations: 16 (09/21/24 0100)  SpO2: 98 % (09/21/24 0100)    Physical Exam  Vitals reviewed.   Constitutional:       General: He is not in acute distress.     Appearance: He is obese. He is not ill-appearing.   HENT:      Mouth/Throat:      Mouth: Mucous membranes are dry.      Pharynx: No posterior oropharyngeal erythema.   Eyes:      Extraocular Movements: Extraocular movements intact.      Conjunctiva/sclera: Conjunctivae  normal.      Pupils: Pupils are equal, round, and reactive to light.   Cardiovascular:      Rate and Rhythm: Normal rate and regular rhythm.      Heart sounds: No murmur heard.     No gallop.   Pulmonary:      Effort: Pulmonary effort is normal. No respiratory distress.   Abdominal:      Palpations: Abdomen is soft. There is no mass.      Tenderness: There is no rebound.      Hernia: No hernia is present.      Comments: Abdomen is obese and soft.  Tender to deep palpation of the lower left quadrant.   Neurological:      Mental Status: He is alert.         Lines/Drains:  Lines/Drains/Airways       Active Status       None                        Additional Data:   Lab Results: I have reviewed the following results:   Results from last 7 days   Lab Units 09/20/24  2115   WBC Thousand/uL 6.99   HEMOGLOBIN g/dL 11.5*   HEMATOCRIT % 35.2*   PLATELETS Thousands/uL 208   SEGS PCT % 59   LYMPHO PCT % 31   MONO PCT % 6   EOS PCT % 3     Results from last 7 days   Lab Units 09/20/24  2115   SODIUM mmol/L 139   POTASSIUM mmol/L 3.5   CHLORIDE mmol/L 105   CO2 mmol/L 24   BUN mg/dL 13   CREATININE mg/dL 1.03   ANION GAP mmol/L 10   CALCIUM mg/dL 9.2   ALBUMIN g/dL 4.2   TOTAL BILIRUBIN mg/dL 0.52   ALK PHOS U/L 42   ALT U/L 51   AST U/L 40*   GLUCOSE RANDOM mg/dL 153*     Results from last 7 days   Lab Units 09/20/24  2115   INR  0.94         Lab Results   Component Value Date    HGBA1C 5.8 (H) 08/15/2022           Imaging Review: Reviewed radiology reports from this admission including: CT abdomen/pelvis.  Other Studies: EKG was reviewed.     Administrative Statements       ** Please Note: This note has been constructed using a voice recognition system. **

## 2024-09-21 NOTE — ASSESSMENT & PLAN NOTE
Patient presenting with hematochezia since 11 AM, however no episodes since presentation to the ED around 8 PM.  Colonoscopy 8/30 revealed hemorrhoids on perianal exam, 3 mm polyp in the cecum, diverticulosis in the cecum, and prolapsed internal hemorrhoids were banded. 2 point hemoglobin drop 13-11.  Patient has had recent travel from [place], no sick contacts that he is aware.    Currently suspecting lower GI bleed secondary to internal hemorrhoids versus diverticulosis.  Reassuringly patient is currently hemodynamically stable and is not currently showing any overt signs of bleeding.    Plan:  Monitor H&H Q8 with hemoglobin goal of greater than 7  Continue to monitor consistency and frequency of bowel movements  Obtain stool sample, if possible, for enteric pathogen panel and infectious workup  Follow-up stool parasite and ova  Follow-up enteric bacterial panel  Consulted GI, appreciate recs  Gentle hydration 75isolyte for 10 hour  Follow-up CBC every morning

## 2024-09-21 NOTE — ED ATTENDING ATTESTATION
9/20/2024  I, Stevan Cano MD, saw and evaluated the patient. I have discussed the patient with the resident/non-physician practitioner and agree with the resident's/non-physician practitioner's findings, Plan of Care, and MDM as documented in the resident's/non-physician practitioner's note, except where noted. All available labs and Radiology studies were reviewed.  I was present for key portions of any procedure(s) performed by the resident/non-physician practitioner and I was immediately available to provide assistance.       At this point I agree with the current assessment done in the Emergency Department.  I have conducted an independent evaluation of this patient a history and physical is as follows: Multiple episodes of bright red bleeding per rectum.  No anticoagulation.  Had a syncopal event at home.  Had a colonoscopy in August which revealed multiple diverticuli, internal hemorrhoids and required banding.    Hemoglobin drop to 11.5  Due to hemoglobin drop, multiple syncopal episodes, generalized weakness, malaise, ongoing rectal bleeding, will need admission to the hospital.  CT high-volume lower GI bleed CT obtained prior to admission.      Results Reviewed       Procedure Component Value Units Date/Time    Basic metabolic panel [[id number]] Collected: 09/21/24 0415    Lab Status: Final result Specimen: Blood from Arm, Left Updated: 09/21/24 0458     Sodium 138 mmol/L      Potassium 4.2 mmol/L      Chloride 104 mmol/L      CO2 27 mmol/L      ANION GAP 7 mmol/L      BUN 11 mg/dL      Creatinine 0.89 mg/dL      Glucose 111 mg/dL      Calcium 8.8 mg/dL      eGFR 105 ml/min/1.73sq m     Narrative:      National [employer] guidelines for Chronic Kidney Disease (CKD):     Stage 1 with normal or high GFR (GFR > 90 mL/min/1.73 square meters)    Stage 2 Mild CKD (GFR = 60-89 mL/min/1.73 square meters)    Stage 3A Moderate CKD (GFR = 45-59 mL/min/1.73 square meters)    Stage 3B Moderate  CKD (GFR = 30-44 mL/min/1.73 square meters)    Stage 4 Severe CKD (GFR = 15-29 mL/min/1.73 square meters)    Stage 5 End Stage CKD (GFR <15 mL/min/1.73 square meters)  Note: GFR calculation is accurate only with a steady state creatinine    CBC and differential [[id number]]  (Abnormal) Collected: 09/21/24 5505    Lab Status: Final result Specimen: Blood from Arm, Left Updated: 09/21/24 0440     WBC 8.53 Thousand/uL      RBC 3.56 Million/uL      Hemoglobin 10.7 g/dL      Hematocrit 32.2 %      MCV 90 fL      MCH 30.1 pg      MCHC 33.2 g/dL      RDW 12.9 %      MPV 10.9 fL      Platelets 195 Thousands/uL      nRBC 0 /100 WBCs      Segmented % 69 %      Immature Grans % 1 %      Lymphocytes % 23 %      Monocytes % 5 %      Eosinophils Relative 2 %      Basophils Relative 0 %      Absolute Neutrophils 5.86 Thousands/µL      Absolute Immature Grans 0.05 Thousand/uL      Absolute Lymphocytes 1.95 Thousands/µL      Absolute Monocytes 0.46 Thousand/µL      Eosinophils Absolute 0.19 Thousand/µL      Basophils Absolute 0.02 Thousands/µL     Urinalysis with microscopic [[id number]]     Lab Status: No result Specimen: Urine     Stool Enteric Bacterial Panel by PCR [[id number]]     Lab Status: No result Specimen: Stool from Per Rectum     Ova and parasite examination [[id number]]     Lab Status: No result Specimen: Stool from Rectum     D-dimer, quantitative [[id number]]  (Normal) Collected: 09/20/24 2115    Lab Status: Final result Specimen: Blood from Arm, Left Updated: 09/20/24 2149     D-Dimer, Quant <0.27 ug/ml FEU     Narrative:      Verify by repeat    Comprehensive metabolic panel [[id number]]  (Abnormal) Collected: 09/20/24 2115    Lab Status: Final result Specimen: Blood from Arm, Left Updated: 09/20/24 2139     Sodium 139 mmol/L      Potassium 3.5 mmol/L      Chloride 105 mmol/L      CO2 24 mmol/L      ANION GAP 10 mmol/L      BUN 13 mg/dL      Creatinine 1.03 mg/dL      Glucose 153 mg/dL      Calcium 9.2 mg/dL      AST 40  U/L      ALT 51 U/L      Alkaline Phosphatase 42 U/L      Total Protein 6.2 g/dL      Albumin 4.2 g/dL      Total Bilirubin 0.52 mg/dL      eGFR 89 ml/min/1.73sq m     Narrative:      National [employer] Foundation guidelines for Chronic Kidney Disease (CKD):     Stage 1 with normal or high GFR (GFR > 90 mL/min/1.73 square meters)    Stage 2 Mild CKD (GFR = 60-89 mL/min/1.73 square meters)    Stage 3A Moderate CKD (GFR = 45-59 mL/min/1.73 square meters)    Stage 3B Moderate CKD (GFR = 30-44 mL/min/1.73 square meters)    Stage 4 Severe CKD (GFR = 15-29 mL/min/1.73 square meters)    Stage 5 End Stage CKD (GFR <15 mL/min/1.73 square meters)  Note: GFR calculation is accurate only with a steady state creatinine    Lipase [[id number]]  (Normal) Collected: 09/20/24 2115    Lab Status: Final result Specimen: Blood from Arm, Left Updated: 09/20/24 2139     Lipase 42 u/L     Protime-INR [[id number]]  (Normal) Collected: 09/20/24 2115    Lab Status: Final result Specimen: Blood from Arm, Left Updated: 09/20/24 2135     Protime 13.3 seconds      INR 0.94    Narrative:      INR Therapeutic Range    Indication                                             INR Range      Atrial Fibrillation                                               2.0-3.0  Hypercoagulable State                                    2.0.2.3  Left Ventricular Asist Device                            2.0-3.0  Mechanical Heart Valve                                  -    Aortic(with afib, MI, embolism, HF, LA enlargement,    and/or coagulopathy)                                     2.0-3.0 (2.5-3.5)     Mitral                                                             2.5-3.5  Prosthetic/Bioprosthetic Heart Valve               2.0-3.0  Venous thromboembolism (VTE: VT, PE        2.0-3.0    APTT [[id number]]  (Normal) Collected: 09/20/24 2115    Lab Status: Final result Specimen: Blood from Arm, Left Updated: 09/20/24 2135     PTT 24 seconds     POCT occult blood stool  [[id number]]  (Abnormal) Collected: 09/20/24 2124    Lab Status: In process Specimen: Stool Updated: 09/20/24 2124     EXT Fecal Occult Blood Positive     Control Valid    CBC and differential [[id number]]  (Abnormal) Collected: 09/20/24 2115    Lab Status: Final result Specimen: Blood from Arm, Left Updated: 09/20/24 2123     WBC 6.99 Thousand/uL      RBC 3.84 Million/uL      Hemoglobin 11.5 g/dL      Hematocrit 35.2 %      MCV 92 fL      MCH 29.9 pg      MCHC 32.7 g/dL      RDW 12.8 %      MPV 10.5 fL      Platelets 208 Thousands/uL      nRBC 0 /100 WBCs      Segmented % 59 %      Immature Grans % 1 %      Lymphocytes % 31 %      Monocytes % 6 %      Eosinophils Relative 3 %      Basophils Relative 0 %      Absolute Neutrophils 4.09 Thousands/µL      Absolute Immature Grans 0.05 Thousand/uL      Absolute Lymphocytes 2.17 Thousands/µL      Absolute Monocytes 0.43 Thousand/µL      Eosinophils Absolute 0.22 Thousand/µL      Basophils Absolute 0.03 Thousands/µL           CT high volume bleeding scan abdomen pelvis   Final Result by Anmol Amador MD (09/21 0721)      1.  Suspected mild sigmoid and rectal wall thickening is at least partially accentuated by underdistention but concerning for coloproctitis, given reported history. No evidence for associated high volume GI bleeding. Consider further evaluation with    colonoscopy.      2.  Moderate right renal pelvocaliectasis. Association with staghorn calculus suggests this is chronic and related to recurrent infections. Correlate for laboratory evidence of active UTI.      Note: The study was marked in EPIC for immediate notification. Colonoscopic follow-up reminder notification was scheduled in the electronic medical record.         Workstation performed: [id number]               ED Course         Critical Care Time  Procedures

## 2024-09-21 NOTE — ED PROVIDER NOTES
"1. Hematochezia    2. Anemia    3. Staghorn kidney stones    4. Syncope    5. BRBPR (bright red blood per rectum)    6. Hydronephrosis of right kidney      ED Disposition       ED Disposition   Admit    Condition   Stable    Date/Time   Sat Sep 21, 2024  1:38 AM    Comment   Case was discussed with JAMES and the patient's admission status was agreed to be Admission Status: inpatient status to the service of Dr. Ware .               Assessment & Plan       Medical Decision Making  Patient with history as below presented to triage with CC: \"Patient presents with:  Rectal Bleeding: Pt just got home from 15 hour flight from [facility], had episode of bright red blood in toilet, laid down on floor after and syncopized, also had episode of syncope for EMS. Pt c/o lower abdominal pain, dizziness, weakness, denies nausea  \"  Hx obtained from pt and family    44-year-old male not on AC/AP with history of internal hemorrhoid that required banding presented to the ED with hematochezia, syncopal episodes and soft blood pressure on arrival. presentation concerning for lower GI bleed.  Patient with bright red blood per rectum, with positive Hemoccult.  Hemodynamically stable at this time.  Will obtain labs, EKG, give 500 mL of LR, and high volume of CT study of abdomen pelvis.  Patient also with recent travel with flight time of 20 hours per patient, will obtain D-dimer for further evaluation of PE.  Type and screen obtained, and patient consented for blood.    2 point Hgb drop since August from 13 to 11. Rectal exam with BRB. Soft pressures 100/50s, better after 500ml LR. High volume CT a/p with no acute findings, no significant arterial abnormalities.  Incidental findings of staghorn calculi noted with mild right hydronephrosis. D-dimer neg. discussed with james, I wanted to await official CT results prior to admission.    Differential diagnosis including but not limited to: upper GI bleed, esophageal varices, gastritis, PUD, " diverticulosis, AVM, hemorrhoids, anemia, coagulopathy, liver disease, tumor, anal fissure.     I have independently ordered, reviewed and interpreted the following: labs and/or imaging and/or EKG studies listed below  Reviewed external records including notes, and prior labs/imaging results.    Disposition: Patient condition, stable.  Discussed need for admission with patient for further management and workup with pt and they are agreeable with plan. Discussed case with [facility] hospitalist , who agreed to admit patient to IP status.     See ED Course for further MDM.      PLEASE NOTE:  This encounter was completed utilizing the [employer] Direct Speech Voice Recognition Software. Grammatical errors, random word insertions, pronoun errors and incomplete sentences are occasional inherent consequences of the system due to software limitations, ambient noise and hardware issues.These may be missed by proof reading prior to affixing electronic signature. Any questions or concerns about the content, text or information contained within the body of this dictation should be directly addressed to the physician for clarification. Please do not hesitate to call me directly if you have any questions or concerns.      Amount and/or Complexity of Data Reviewed  External Data Reviewed: labs and notes.  Labs: ordered. Decision-making details documented in ED Course.  Radiology:  Decision-making details documented in ED Course.  ECG/medicine tests: ordered and independent interpretation performed. Decision-making details documented in ED Course.    Risk  Prescription drug management.  Decision regarding hospitalization.                ED Course as of 09/22/24 0142   Fri Sep 20, 2024   2110 Blood Pressure: 122/88   2110 Pulse: 90   2124 EXT Fecal Occult Blood (Ref: Negative)(!): Positive   2124 Hemoglobin(!): 11.5   2125 ECG 12 lead  On my independent interpretation, normal sinus rhythm at 85.  Normal axis.  Normal intervals.   Early transition.  No acute ischemic changes.  No significant change when compared to prior EKG done on October 22, 2023.   2141 Protime-INR  WNL.   2141 APTT  WNL.   2141 LIPASE: 42  WNL.   2141 Comprehensive metabolic panel(!)  No acute electrolyte abnormality.  AST mildly elevated.  Otherwise no acute LFT elevation.  Renal function at baseline.   2151 D-Dimer, Quant: <0.27   Sat Sep 21, 2024   0125 CT high volume bleeding scan abdomen pelvis  1. No acute findings, no significant arterial abnormalities. 2. Staghorn stone is noted in the right renal pelvis with multiple smaller stones noted in the right kidney with mild right hydronephrosis.          Medications   acetaminophen (TYLENOL) tablet 650 mg (has no administration in time range)   enoxaparin (LOVENOX) subcutaneous injection 40 mg (40 mg Subcutaneous Not Given 9/21/24 0938)   hydrocortisone (ANUSOL-HC) 2.5 % rectal cream ( Topical Given 9/21/24 0929)   patient supplied medication (1 each Oral Given 9/21/24 1040)   lactated ringers bolus 500 mL (0 mL Intravenous Stopped 9/20/24 2314)   iohexol (OMNIPAQUE) 350 MG/ML injection (MULTI-DOSE) 100 mL (100 mL Intravenous Given 9/20/24 2259)       History of Present Illness       44-year-old male with history of recent colonoscopy showing internal hemorrhoid requiring banding, epilepsy presenting to the ED with complaints of hematochezia and syncopal episodes.  Patient reports returning from [place] after 20-hour direct flight today.  Since arriving home he reports 2 syncopal episodes, when going to the bathroom.  Denies passing bowel movements when the syncopal episodes occur, reports that he was on his way to the bathroom.  Events are witnessed by his wife, who denies any injury or trauma.  Patient also reports passing significant amount of bloody stool, bright red blood in nature.  Patient unable to quantify exact amount of blood loss.  Denies AC/AP use.  At this time endorses lightheadedness/dizziness.  Denies  fever, chills, vision changes, neck pain/stiffness, cough, congestion, sore throat, hemoptysis, chest pain, shortness of breath, back pain, abdominal pain, nausea, vomiting, diarrhea, constipation, dysuria, hematuria, urgency, frequency, leg pain, leg swelling.  Denies prior history of PE/DVT.        Review of Systems   Constitutional:  Negative for chills and fever.   HENT:  Negative for congestion and sore throat.    Eyes:  Negative for photophobia, pain, redness and visual disturbance.   Respiratory:  Negative for cough, chest tightness and shortness of breath.    Cardiovascular:  Negative for chest pain, palpitations and leg swelling.   Gastrointestinal:  Positive for anal bleeding and blood in stool. Negative for abdominal pain, constipation, diarrhea, nausea and vomiting.   Genitourinary:  Negative for dysuria, flank pain, frequency, hematuria, testicular pain and urgency.   Musculoskeletal:  Negative for arthralgias, back pain, neck pain and neck stiffness.   Skin:  Negative for color change and rash.   Neurological:  Positive for dizziness, syncope and light-headedness. Negative for seizures, numbness and headaches.   All other systems reviewed and are negative.          Objective     ED Triage Vitals   Temperature Pulse Blood Pressure Respirations SpO2 Patient Position - Orthostatic VS   09/20/24 2107 09/20/24 2107 09/20/24 2107 09/20/24 2107 09/20/24 2107 09/20/24 2300   97.7 °F (36.5 °C) 90 122/88 16 100 % Sitting      Temp Source Heart Rate Source BP Location FiO2 (%) Pain Score    09/20/24 2107 09/20/24 2107 09/20/24 2107 -- 09/21/24 1537    Oral Monitor Right arm  No Pain        Physical Exam  Vitals and nursing note reviewed.   Constitutional:       General: He is in acute distress.      Appearance: He is well-developed. He is ill-appearing. He is not toxic-appearing.   HENT:      Head: Normocephalic and atraumatic.      Right Ear: External ear normal.      Left Ear: External ear normal.      Nose:  Nose normal. No congestion.      Mouth/Throat:      Mouth: Mucous membranes are moist.      Pharynx: Oropharynx is clear. No oropharyngeal exudate or posterior oropharyngeal erythema.   Eyes:      Extraocular Movements: Extraocular movements intact.      Conjunctiva/sclera: Conjunctivae normal.      Pupils: Pupils are equal, round, and reactive to light.   Cardiovascular:      Rate and Rhythm: Normal rate and regular rhythm.      Pulses: Normal pulses.      Heart sounds: Normal heart sounds. No murmur heard.     No friction rub. No gallop.   Pulmonary:      Effort: Pulmonary effort is normal. No respiratory distress.      Breath sounds: Normal breath sounds. No stridor. No wheezing, rhonchi or rales.   Abdominal:      General: Bowel sounds are normal.      Palpations: Abdomen is soft.      Tenderness: There is no abdominal tenderness. There is no right CVA tenderness, left CVA tenderness, guarding or rebound.   Genitourinary:     Rectum: Guaiac result positive. Internal hemorrhoid present. No tenderness, anal fissure or external hemorrhoid.   Musculoskeletal:         General: No swelling, tenderness or deformity. Normal range of motion.      Cervical back: Normal range of motion and neck supple. No rigidity or tenderness.      Right lower leg: No edema.      Left lower leg: No edema.   Lymphadenopathy:      Cervical: No cervical adenopathy.   Skin:     General: Skin is warm and dry.      Capillary Refill: Capillary refill takes less than 2 seconds.      Coloration: Skin is not jaundiced or pale.      Findings: No bruising, erythema, lesion or rash.   Neurological:      General: No focal deficit present.      Mental Status: He is alert and oriented to person, place, and time. Mental status is at baseline.      GCS: GCS eye subscore is 4. GCS verbal subscore is 5. GCS motor subscore is 6.      Cranial Nerves: Cranial nerves 2-12 are intact. No cranial nerve deficit, dysarthria or facial asymmetry.      Sensory:  Sensation is intact. No sensory deficit.      Motor: Motor function is intact. No abnormal muscle tone or pronator drift.      Coordination: Coordination is intact.      Gait: Gait is intact.   Psychiatric:         Mood and Affect: Mood normal.         Behavior: Behavior normal.         Thought Content: Thought content normal.         Labs Reviewed   CBC AND DIFFERENTIAL - Abnormal       Result Value    WBC 6.99      RBC 3.84 (*)     Hemoglobin 11.5 (*)     Hematocrit 35.2 (*)     MCV 92      MCH 29.9      MCHC 32.7      RDW 12.8      MPV 10.5      Platelets 208      nRBC 0      Segmented % 59      Immature Grans % 1      Lymphocytes % 31      Monocytes % 6      Eosinophils Relative 3      Basophils Relative 0      Absolute Neutrophils 4.09      Absolute Immature Grans 0.05      Absolute Lymphocytes 2.17      Absolute Monocytes 0.43      Eosinophils Absolute 0.22      Basophils Absolute 0.03     COMPREHENSIVE METABOLIC PANEL - Abnormal    Sodium 139      Potassium 3.5      Chloride 105      CO2 24      ANION GAP 10      BUN 13      Creatinine 1.03      Glucose 153 (*)     Calcium 9.2      AST 40 (*)     ALT 51      Alkaline Phosphatase 42      Total Protein 6.2 (*)     Albumin 4.2      Total Bilirubin 0.52      eGFR 89      Narrative:     National Kidney Disease Foundation guidelines for Chronic Kidney Disease (CKD):     Stage 1 with normal or high GFR (GFR > 90 mL/min/1.73 square meters)    Stage 2 Mild CKD (GFR = 60-89 mL/min/1.73 square meters)    Stage 3A Moderate CKD (GFR = 45-59 mL/min/1.73 square meters)    Stage 3B Moderate CKD (GFR = 30-44 mL/min/1.73 square meters)    Stage 4 Severe CKD (GFR = 15-29 mL/min/1.73 square meters)    Stage 5 End Stage CKD (GFR <15 mL/min/1.73 square meters)  Note: GFR calculation is accurate only with a steady state creatinine   URINALYSIS WITH MICROSCOPIC - Abnormal    Color, UA Colorless      Clarity, UA Clear      Specific Gravity, UA 1.007      pH, UA 6.0      Leukocytes,  UA Trace (*)     Nitrite, UA Negative      Protein, UA Negative      Glucose, UA Negative      Ketones, UA Negative      Urobilinogen, UA <2.0      Bilirubin, UA Negative      Occult Blood, UA Small (*)     RBC, UA 2-4 (*)     WBC, UA 2-4 (*)     Epithelial Cells None Seen      Bacteria, UA None Seen     CBC AND DIFFERENTIAL - Abnormal    WBC 8.53      RBC 3.56 (*)     Hemoglobin 10.7 (*)     Hematocrit 32.2 (*)     MCV 90      MCH 30.1      MCHC 33.2      RDW 12.9      MPV 10.9      Platelets 195      nRBC 0      Segmented % 69      Immature Grans % 1      Lymphocytes % 23      Monocytes % 5      Eosinophils Relative 2      Basophils Relative 0      Absolute Neutrophils 5.86      Absolute Immature Grans 0.05      Absolute Lymphocytes 1.95      Absolute Monocytes 0.46      Eosinophils Absolute 0.19      Basophils Absolute 0.02     POCT OCCULT BLOOD STOOL - Abnormal    EXT Fecal Occult Blood Positive (*)     Control Valid     LIPASE - Normal    Lipase 42     D-DIMER, QUANTITATIVE - Normal    D-Dimer, Quant <0.27      Narrative:     Verify by repeat   PROTIME-INR - Normal    Protime 13.3      INR 0.94      Narrative:     INR Therapeutic Range    Indication                                             INR Range      Atrial Fibrillation                                               2.0-3.0  Hypercoagulable State                                    2.0.2.3  Left Ventricular Asist Device                            2.0-3.0  Mechanical Heart Valve                                  -    Aortic(with afib, MI, embolism, HF, LA enlargement,    and/or coagulopathy)                                     2.0-3.0 (2.5-3.5)     Mitral                                                             2.5-3.5  Prosthetic/Bioprosthetic Heart Valve               2.0-3.0  Venous thromboembolism (VTE: VT, PE        2.0-3.0   APTT - Normal    PTT 24     STOOL ENTERIC BACTERIAL PANEL BY PCR   OVA AND PARASITE EXAMINATION   BASIC METABOLIC PANEL     Sodium 138      Potassium 4.2      Chloride 104      CO2 27      ANION GAP 7      BUN 11      Creatinine 0.89      Glucose 111      Calcium 8.8      eGFR 105      Narrative:     National Kidney Disease Foundation guidelines for Chronic Kidney Disease (CKD):     Stage 1 with normal or high GFR (GFR > 90 mL/min/1.73 square meters)    Stage 2 Mild CKD (GFR = 60-89 mL/min/1.73 square meters)    Stage 3A Moderate CKD (GFR = 45-59 mL/min/1.73 square meters)    Stage 3B Moderate CKD (GFR = 30-44 mL/min/1.73 square meters)    Stage 4 Severe CKD (GFR = 15-29 mL/min/1.73 square meters)    Stage 5 End Stage CKD (GFR <15 mL/min/1.73 square meters)  Note: GFR calculation is accurate only with a steady state creatinine   TYPE AND SCREEN    ABO Grouping B      Rh Factor Positive      Antibody Screen Negative      Specimen Expiration Date 20240923     ABORH RECHECK    ABO Grouping B      Rh Factor Positive       CT high volume bleeding scan abdomen pelvis   Final Interpretation by Anmol Amador MD (09/21 0721)      1.  Suspected mild sigmoid and rectal wall thickening is at least partially accentuated by underdistention but concerning for coloproctitis, given reported history. No evidence for associated high volume GI bleeding. Consider further evaluation with    colonoscopy.      2.  Moderate right renal pelvocaliectasis. Association with staghorn calculus suggests this is chronic and related to recurrent infections. Correlate for laboratory evidence of active UTI.      Note: The study was marked in EPIC for immediate notification. Colonoscopic follow-up reminder notification was scheduled in the electronic medical record.         Workstation performed: [id number]             ECG 12 Lead Documentation Only    Date/Time: 9/21/2024 9:25 PM    Performed by: Dominic Fuentes DO  Authorized by: Dominic Fuentes DO    Patient location:  ED  Previous ECG:     Previous ECG:  Compared to current    Comparison ECG info:   10/22/2023  Interpretation:     Interpretation: normal    Rate:     ECG rate:  85    ECG rate assessment: normal    Rhythm:     Rhythm: sinus rhythm    Ectopy:     Ectopy: none    QRS:     QRS axis:  Normal    QRS intervals:  Normal  Conduction:     Conduction: normal    ST segments:     ST segments:  Normal  T waves:     T waves: normal    Other findings:     Other findings: early transition        ED Medication and Procedure Management   Prior to Admission Medications   Prescriptions Last Dose Informant Patient Reported? Taking?   Brivaracetam 100 MG TABS   Yes Yes   Sig: Take 100 mg by mouth in the morning Takes in am.   Multiple Vitamins-Minerals (CENTRUM SILVER PO)   Yes No   Sig: Take by mouth   OXcarbazepine (TRILEPTAL) 300 mg tablet Not Taking  No No   Sig: Take 1 tablet (300 mg total) by mouth every 12 (twelve) hours   Patient not taking: Reported on 9/21/2024   Valproate Sodium (VALPROIC ACID PO)   Yes No   Sig: Take 300 mg by mouth daily Medication filled in [facility]   Patient not taking: Reported on 12/30/2022      Facility-Administered Medications: None     Current Discharge Medication List        CONTINUE these medications which have NOT CHANGED    Details   Brivaracetam 100 MG TABS Take 100 mg by mouth in the morning Takes in am.      Multiple Vitamins-Minerals (CENTRUM SILVER PO) Take by mouth      OXcarbazepine (TRILEPTAL) 300 mg tablet Take 1 tablet (300 mg total) by mouth every 12 (twelve) hours  Qty: 60 tablet, Refills: 1    Associated Diagnoses: Generalized seizure disorder (HCC)      Valproate Sodium (VALPROIC ACID PO) Take 300 mg by mouth daily Medication filled in [facility]           No discharge procedures on file.     Dominic Fuentes DO  09/22/24 0142

## 2024-09-21 NOTE — CONSULTS
Consultation - Urology   Name: Marjorie Castro 44 y.o. male I MRN: [id number]  Unit/Bed#: ED-32 I Date of Admission: 9/20/2024   Date of Service: 9/21/2024 I Hospital Day: 0   Consults  Physician Requesting Evaluation: Vj Guerrero MD   Reason for Evaluation / Principal Problem: Staghorn calculus    Assessment & Plan  Hydronephrosis of right kidney  Secondary to right staghorn calculus.    Without evidence of toxicity, acute kidney injury or renal colic.  Incidental finding.    Plan:  Medical optimization and treatment for primary admission concern  Trend labs and observe for colicky symptomology.  Outpatient follow-up to discuss elective stone treatment.  No indication for acute  surgical instrumentation.  Will only consider ureteral stent or PCN should patient develop fever, chills, flank pain, or ROCAEL.  Office will contact patient/caregiver with hospital follow-up appointment date and time once discharged.          History of Present Illness   Marjorie Castro is a 44 y.o. male who presents with rectal bleed.  He is not previously known to our service and denies prior formal urologic consultation, evaluation or  surgical manipulation.  Patient is afebrile, hemodynamically stable without complaints of flank, abdominal or suprapubic pain, dysuria or gross hematuria.  Our service was contacted after CT demonstrated incidental finding of right staghorn calculus with hydronephrosis.  This is not accompanied by toxicity or acute kidney injury.  Patient denies renal colic.    Review of Systems  Review of Systems - History obtained from chart review and the patient  General ROS: negative for - chills or fever  Respiratory ROS: no cough, shortness of breath, or wheezing  Cardiovascular ROS: no chest pain or dyspnea on exertion  Gastrointestinal ROS: positive for - blood in stools  Genito-Urinary ROS: no dysuria, trouble voiding, or hematuria  Neurological ROS: no TIA or stroke symptoms    I have reviewed the patient's PMH,  PSH, Social History, Family History, Meds, and Allergies  Historical Information   Past Medical History:   Diagnosis Date    Generalized seizure disorder (HCC)     Hip strain, left, subsequent encounter     Seizures (HCC)     Shoulder strain, left, subsequent encounter      Past Surgical History:   Procedure Laterality Date    LITHOTRIPSY       Social History     Tobacco Use    Smoking status: Never    Smokeless tobacco: Never   Vaping Use    Vaping status: Never Used   Substance and Sexual Activity    Alcohol use: Yes     Alcohol/week: 5.0 standard drinks of alcohol     Types: 5 Glasses of wine per week    Drug use: No    Sexual activity: Not on file     E-Cigarette/Vaping    E-Cigarette Use Never User      E-Cigarette/Vaping Substances    Nicotine No     THC No     CBD No     Flavoring No     Other No     Unknown No      Family History   Problem Relation Age of Onset    No Known Problems Mother     Hypertension Father     Diabetes Father     No Known Problems Brother      Social History     Tobacco Use    Smoking status: Never    Smokeless tobacco: Never   Vaping Use    Vaping status: Never Used   Substance and Sexual Activity    Alcohol use: Yes     Alcohol/week: 5.0 standard drinks of alcohol     Types: 5 Glasses of wine per week    Drug use: No    Sexual activity: Not on file       Current Facility-Administered Medications:     acetaminophen (TYLENOL) tablet 650 mg, Q6H PRN    enoxaparin (LOVENOX) subcutaneous injection 40 mg, Daily    hydrocortisone (ANUSOL-HC) 2.5 % rectal cream, BID    pantoprazole (PROTONIX) injection 40 mg, Q24H YARIEL    patient supplied medication, QAM  Prior to Admission Medications   Prescriptions Last Dose Informant Patient Reported? Taking?   Brivaracetam 100 MG TABS   Yes Yes   Sig: Take 100 mg by mouth in the morning Takes in am.   Multiple Vitamins-Minerals (CENTRUM SILVER PO)   Yes No   Sig: Take by mouth   OXcarbazepine (TRILEPTAL) 300 mg tablet Not Taking  No No   Sig: Take 1  tablet (300 mg total) by mouth every 12 (twelve) hours   Patient not taking: Reported on 9/21/2024   Valproate Sodium (VALPROIC ACID PO)   Yes No   Sig: Take 300 mg by mouth daily Medication filled in [facility]   Patient not taking: Reported on 12/30/2022      Facility-Administered Medications: None     Patient has no known allergies.    Objective      Temp:  [97.7 °F (36.5 °C)] 97.7 °F (36.5 °C)  HR:  [68-90] 85  Resp:  [16] 16  BP: (100-150)/(57-90) 150/90  O2 Device: None (Room air)          I/O         09/19 0701 09/20 0700 09/20 0701 09/21 0700 09/21 0701 09/22 0700    IV Piggyback  500     Total Intake  500     Net  +500                  Lines/Drains/Airways       Active Status       None                  Physical Exam General appearance: alert and oriented, in no acute distress, appears stated age, cooperative, no distress, and moderately obese  Head: Normocephalic, without obvious abnormality, atraumatic  Neck: no JVD and supple, symmetrical, trachea midline  Lungs: diminished breath sounds  Heart: regular rate and rhythm, S1, S2 normal, no murmur, click, rub or gallop  Abdomen: soft, non-tender; bowel sounds normal; no masses,  no organomegaly  Extremities: extremities normal, warm and well-perfused; no cyanosis, clubbing, or edema  Pulses: 2+ and symmetric  Neurologic: Grossly normal      Lab Results: I have reviewed the following results: CBC/BMP:   .     09/21/24  0415   WBC 8.53   HGB 10.7*   HCT 32.2*      SODIUM 138   K 4.2      CO2 27   BUN 11   CREATININE 0.89   GLUC 111      Imaging Review: Reviewed radiology reports from this admission including: CT abdomen/pelvis.    Procedure Component Value Units Date/Time   CT high volume bleeding scan abdomen pelvis [[id number]] Collected: 09/21/24 0708   Order Status: Completed Updated: 09/21/24 0723   Narrative:     CT ABDOMEN AND PELVIS - WITHOUT AND WITH IV CONTRAST    INDICATION:   lower abd pain, Bright red bleeding, syncopal episodes x2, 2  point Hgb drop..    COMPARISON: Pelvic MRI dated 7/9/2019.    TECHNIQUE: CT examination of the abdomen and pelvis was performed both prior to and after the administration of intravenous contrast. Multiplanar 2D reformatted images were created from the source data.    Radiation dose length product (DLP) for this visit: 2548.92 mGy-cm . This examination, like all CT scans performed in the [facility], was performed utilizing techniques to minimize radiation dose exposure, including the use of iterative  reconstruction and automated exposure control.    IV Contrast: 100 mL of iohexol (OMNIPAQUE)  Enteric Contrast: Not administered.    FINDINGS:    ABDOMEN    BOWEL: No gastric wall thickening. No dilated or inflamed loops of small bowel. Small noninflamed colonic diverticula clustered around the proximal ascending colon. Sigmoid colonic wall thickness difficult to  due to nondistention but there is  suggestion of at least mild distal sigmoid and rectal wall thickening, circumferentially. No pericolonic fat stranding.    No intraluminal extravasation of contrast or other evidence for high-volume bleed.    LOWER CHEST: No clinically significant abnormality in the visualized lower chest.    LIVER/BILIARY TREE: Diffuse hepatic steatosis. Size and contour are normal. No focal masses. No biliary ductal dilatation.    GALLBLADDER: No calcified gallstones. No pericholecystic inflammatory change.    SPLEEN: Unremarkable.    PANCREAS: Unremarkable.    ADRENAL GLANDS: Unremarkable.    KIDNEYS/URETERS: Right moderate to severe pelvocaliectasis. Right appears within allowable limits of caliber. There are multiple nephroliths and calyceal/pelvic casting-type (staghorn) stones in the upper collecting system. No ureteral stones  demonstrated. No urolithiasis on the left. No suspicious renal masses or perinephric collections.    APPENDIX: Normal.    ABDOMINOPELVIC CAVITY: No ascites. No pneumoperitoneum. No  lymphadenopathy.    VESSELS: Abdominal aorta and branch vessels are normal in caliber without aneurysm or significant atheromatous disease. No central venous thromboses demonstrated.    PELVIS    REPRODUCTIVE ORGANS: Unremarkable for patient's age.    URINARY BLADDER: Unremarkable.    ABDOMINAL WALL/INGUINAL REGIONS: Mild diastases recti without hernia.    BONES: No acute fracture or suspicious osseous lesion.   Impression:       1.  Suspected mild sigmoid and rectal wall thickening is at least partially accentuated by underdistention but concerning for coloproctitis, given reported history. No evidence for associated high volume GI bleeding. Consider further evaluation with  colonoscopy.    2.  Moderate right renal pelvocaliectasis. Association with staghorn calculus suggests this is chronic and related to recurrent infections. Correlate for laboratory evidence of active UTI.    Note: The study was marked in EPIC for immediate notification. Colonoscopic follow-up reminder notification was scheduled in the electronic medical record.      Workstation performed: [id number]

## 2024-09-21 NOTE — ASSESSMENT & PLAN NOTE
Staghorn stone with associated right-sided hydronephrosis visualized on abdominal CT. Patient's kidney function is reasuringly stable, and patient is not currently having flank pain or urinary symptoms, overall asymptomatic.    Plan:  Consult urology regarding stone and to set up potential outpatient follow-up  Follow-up BMP a.mDorothy

## 2024-09-22 VITALS
OXYGEN SATURATION: 100 % | TEMPERATURE: 98.6 F | RESPIRATION RATE: 16 BRPM | HEART RATE: 75 BPM | DIASTOLIC BLOOD PRESSURE: 91 MMHG | SYSTOLIC BLOOD PRESSURE: 140 MMHG

## 2024-09-22 LAB
ALBUMIN SERPL BCG-MCNC: 4.1 G/DL (ref 3.5–5)
ALP SERPL-CCNC: 37 U/L (ref 34–104)
ALT SERPL W P-5'-P-CCNC: 54 U/L (ref 7–52)
ANION GAP SERPL CALCULATED.3IONS-SCNC: 7 MMOL/L (ref 4–13)
AST SERPL W P-5'-P-CCNC: 40 U/L (ref 13–39)
BASOPHILS # BLD AUTO: 0.02 THOUSANDS/ΜL (ref 0–0.1)
BASOPHILS NFR BLD AUTO: 0 % (ref 0–1)
BILIRUB SERPL-MCNC: 0.58 MG/DL (ref 0.2–1)
BUN SERPL-MCNC: 9 MG/DL (ref 5–25)
CALCIUM SERPL-MCNC: 8.7 MG/DL (ref 8.4–10.2)
CHLORIDE SERPL-SCNC: 102 MMOL/L (ref 96–108)
CO2 SERPL-SCNC: 30 MMOL/L (ref 21–32)
CREAT SERPL-MCNC: 0.92 MG/DL (ref 0.6–1.3)
EOSINOPHIL # BLD AUTO: 0.25 THOUSAND/ΜL (ref 0–0.61)
EOSINOPHIL NFR BLD AUTO: 4 % (ref 0–6)
ERYTHROCYTE [DISTWIDTH] IN BLOOD BY AUTOMATED COUNT: 12.9 % (ref 11.6–15.1)
GFR SERPL CREATININE-BSD FRML MDRD: 102 ML/MIN/1.73SQ M
GLUCOSE SERPL-MCNC: 108 MG/DL (ref 65–140)
HCT VFR BLD AUTO: 29.8 % (ref 36.5–49.3)
HGB BLD-MCNC: 10.1 G/DL (ref 12–17)
IMM GRANULOCYTES # BLD AUTO: 0.03 THOUSAND/UL (ref 0–0.2)
IMM GRANULOCYTES NFR BLD AUTO: 1 % (ref 0–2)
LYMPHOCYTES # BLD AUTO: 1.76 THOUSANDS/ΜL (ref 0.6–4.47)
LYMPHOCYTES NFR BLD AUTO: 31 % (ref 14–44)
MCH RBC QN AUTO: 30.6 PG (ref 26.8–34.3)
MCHC RBC AUTO-ENTMCNC: 33.9 G/DL (ref 31.4–37.4)
MCV RBC AUTO: 90 FL (ref 82–98)
MONOCYTES # BLD AUTO: 0.41 THOUSAND/ΜL (ref 0.17–1.22)
MONOCYTES NFR BLD AUTO: 7 % (ref 4–12)
NEUTROPHILS # BLD AUTO: 3.18 THOUSANDS/ΜL (ref 1.85–7.62)
NEUTS SEG NFR BLD AUTO: 57 % (ref 43–75)
NRBC BLD AUTO-RTO: 0 /100 WBCS
PLATELET # BLD AUTO: 187 THOUSANDS/UL (ref 149–390)
PMV BLD AUTO: 11.1 FL (ref 8.9–12.7)
POTASSIUM SERPL-SCNC: 3.5 MMOL/L (ref 3.5–5.3)
PROT SERPL-MCNC: 6.1 G/DL (ref 6.4–8.4)
RBC # BLD AUTO: 3.3 MILLION/UL (ref 3.88–5.62)
SODIUM SERPL-SCNC: 139 MMOL/L (ref 135–147)
WBC # BLD AUTO: 5.65 THOUSAND/UL (ref 4.31–10.16)

## 2024-09-22 PROCEDURE — 87177 OVA AND PARASITES SMEARS: CPT

## 2024-09-22 PROCEDURE — 99239 HOSP IP/OBS DSCHRG MGMT >30: CPT | Performed by: HOSPITALIST

## 2024-09-22 PROCEDURE — 85025 COMPLETE CBC W/AUTO DIFF WBC: CPT

## 2024-09-22 PROCEDURE — 99232 SBSQ HOSP IP/OBS MODERATE 35: CPT | Performed by: INTERNAL MEDICINE

## 2024-09-22 PROCEDURE — 87505 NFCT AGENT DETECTION GI: CPT

## 2024-09-22 PROCEDURE — 87209 SMEAR COMPLEX STAIN: CPT

## 2024-09-22 PROCEDURE — 80053 COMPREHEN METABOLIC PANEL: CPT

## 2024-09-22 RX ORDER — PANTOPRAZOLE SODIUM 40 MG/1
40 TABLET, DELAYED RELEASE ORAL
Qty: 30 TABLET | Refills: 0 | Status: SHIPPED | OUTPATIENT
Start: 2024-09-22 | End: 2024-10-22

## 2024-09-22 RX ADMIN — PANTOPRAZOLE SODIUM 40 MG: 40 TABLET, DELAYED RELEASE ORAL at 06:04

## 2024-09-22 RX ADMIN — ENOXAPARIN SODIUM 40 MG: 40 INJECTION SUBCUTANEOUS at 10:09

## 2024-09-22 RX ADMIN — HYDROCORTISONE 2.5%: 25 CREAM TOPICAL at 10:09

## 2024-09-22 NOTE — PROGRESS NOTES
Progress Note - Gastroenterology   Name: Marjorie Castro 44 y.o. male I MRN: [id number]  Unit/Bed#: S -01 I Date of Admission: 9/20/2024   Date of Service: 9/22/2024 I Hospital Day: 1     Assessment & Plan  Hematochezia  Pt with painless rectal bleeding with bright red blood/clots and drop in hemoglobin from baseline 13.6 to 11.5 with presyncopal episode most concerning for bleeding from rectal ulcer from banding last month vs self limited diverticular or proctocolitis as seen on CT although less likely w/ normal WBC, no fevers.  He hasn't had any further bleeding since Friday, reports having a formed brown stool this AM. Hgb stable 10.1 from 10.7 last night.     -Okay to advance diet  -Follow up stool studies  -No need for repeat colonoscopy at this time as he had recent colonoscopy 8/30 and bleeding has subsided  -Ok for dc from GI standpoint          Subjective:     Pt with wife at bedside. Reports no further rectal bleeding since Friday. Had a formed brown stool today. Denies any rectal pain, abdominal pain.    Medication Administration - last 24 hours from 09/21/2024 1052 to 09/22/2024 1052         Date/Time Order Dose Route Action Action by     09/22/2024 1009 EDT enoxaparin (LOVENOX) subcutaneous injection 40 mg 40 mg Subcutaneous Given Debra Wing RN     09/22/2024 1009 EDT hydrocortisone (ANUSOL-HC) 2.5 % rectal cream -- Topical Given Debra Wing RN     09/21/2024 2050 EDT hydrocortisone (ANUSOL-HC) 2.5 % rectal cream -- Topical Given Anisa Cornejo RN     09/22/2024 1009 EDT patient supplied medication 1 each Oral Given Debra Wing RN     09/21/2024 1508 EDT pantoprazole (PROTONIX) injection 40 mg 40 mg Intravenous Given Debra Wing RN     09/22/2024 0604 EDT pantoprazole (PROTONIX) EC tablet 40 mg 40 mg Oral Given Anisa Cornejo RN            Objective:     Vitals: Blood pressure 140/91, pulse 75, temperature 98.6 °F (37 °C), resp. rate 16, SpO2 100%.,There is no height or weight  on file to calculate BMI.      Intake/Output Summary (Last 24 hours) at 9/22/2024 1052  Last data filed at 9/22/2024 0643  Gross per 24 hour   Intake --   Output 1375 ml   Net -1375 ml       Physical Exam:   General Appearance: Awake and alert, in no acute distress  Abdomen: Soft, non-tender, non-distended; bowel sounds normal; no masses or no organomegaly    Invasive Devices       Peripheral Intravenous Line  Duration             Peripheral IV 09/20/24 Left Antecubital 1 day                    Lab Results:  Admission on 09/20/2024   Component Date Value    Ventricular Rate 09/20/2024 85     Atrial Rate 09/20/2024 85     ND Interval 09/20/2024 138     QRSD Interval 09/20/2024 92     QT Interval 09/20/2024 370     QTC Interval 09/20/2024 440     P Axis 09/20/2024 63     QRS [place] 09/20/2024 46     T Wave [place] 09/20/2024 17     WBC 09/20/2024 6.99     RBC 09/20/2024 3.84 (L)     Hemoglobin 09/20/2024 11.5 (L)     Hematocrit 09/20/2024 35.2 (L)     MCV 09/20/2024 92     MCH 09/20/2024 29.9     MCHC 09/20/2024 32.7     RDW 09/20/2024 12.8     MPV 09/20/2024 10.5     Platelets 09/20/2024 208     nRBC 09/20/2024 0     Segmented % 09/20/2024 59     Immature Grans % 09/20/2024 1     Lymphocytes % 09/20/2024 31     Monocytes % 09/20/2024 6     Eosinophils Relative 09/20/2024 3     Basophils Relative 09/20/2024 0     Absolute Neutrophils 09/20/2024 4.09     Absolute Immature Grans 09/20/2024 0.05     Absolute Lymphocytes 09/20/2024 2.17     Absolute Monocytes 09/20/2024 0.43     Eosinophils Absolute 09/20/2024 0.22     Basophils Absolute 09/20/2024 0.03     Sodium 09/20/2024 139     Potassium 09/20/2024 3.5     Chloride 09/20/2024 105     CO2 09/20/2024 24     ANION GAP 09/20/2024 10     BUN 09/20/2024 13     Creatinine 09/20/2024 1.03     Glucose 09/20/2024 153 (H)     Calcium 09/20/2024 9.2     AST 09/20/2024 40 (H)     ALT 09/20/2024 51     Alkaline Phosphatase 09/20/2024 42     Total Protein 09/20/2024 6.2 (L)     Albumin  09/20/2024 4.2     Total Bilirubin 09/20/2024 0.52     eGFR 09/20/2024 89     Lipase 09/20/2024 42     ABO Grouping 09/20/2024 B     Rh Factor 09/20/2024 Positive     Antibody Screen 09/20/2024 Negative     Specimen Expiration Date 09/20/2024 [id number]     D-Dimer, Quant 09/20/2024 <0.27     Protime 09/20/2024 13.3     INR 09/20/2024 0.94     PTT 09/20/2024 24     ABO Grouping 09/21/2024 B     Rh Factor 09/21/2024 Positive     EXT Fecal Occult Blood 09/20/2024 Positive (A)     Control 09/20/2024 Valid     Color, UA 09/21/2024 Colorless     Clarity, UA 09/21/2024 Clear     Specific Gravity, UA 09/21/2024 1.007     pH, UA 09/21/2024 6.0     Leukocytes, UA 09/21/2024 Trace (A)     Nitrite, UA 09/21/2024 Negative     Protein, UA 09/21/2024 Negative     Glucose, UA 09/21/2024 Negative     Ketones, UA 09/21/2024 Negative     Urobilinogen, UA 09/21/2024 <2.0     Bilirubin, UA 09/21/2024 Negative     Occult Blood, UA 09/21/2024 Small (A)     RBC, UA 09/21/2024 2-4 (A)     WBC, UA 09/21/2024 2-4 (A)     Epithelial Cells 09/21/2024 None Seen     Bacteria, UA 09/21/2024 None Seen     Sodium 09/21/2024 138     Potassium 09/21/2024 4.2     Chloride 09/21/2024 104     CO2 09/21/2024 27     ANION GAP 09/21/2024 7     BUN 09/21/2024 11     Creatinine 09/21/2024 0.89     Glucose 09/21/2024 111     Calcium 09/21/2024 8.8     eGFR 09/21/2024 105     WBC 09/21/2024 8.53     RBC 09/21/2024 3.56 (L)     Hemoglobin 09/21/2024 10.7 (L)     Hematocrit 09/21/2024 32.2 (L)     MCV 09/21/2024 90     MCH 09/21/2024 30.1     MCHC 09/21/2024 33.2     RDW 09/21/2024 12.9     MPV 09/21/2024 10.9     Platelets 09/21/2024 195     nRBC 09/21/2024 0     Segmented % 09/21/2024 69     Immature Grans % 09/21/2024 1     Lymphocytes % 09/21/2024 23     Monocytes % 09/21/2024 5     Eosinophils Relative 09/21/2024 2     Basophils Relative 09/21/2024 0     Absolute Neutrophils 09/21/2024 5.86     Absolute Immature Grans 09/21/2024 0.05     Absolute Lymphocytes  09/21/2024 1.95     Absolute Monocytes 09/21/2024 0.46     Eosinophils Absolute 09/21/2024 0.19     Basophils Absolute 09/21/2024 0.02     WBC 09/22/2024 5.65     RBC 09/22/2024 3.30 (L)     Hemoglobin 09/22/2024 10.1 (L)     Hematocrit 09/22/2024 29.8 (L)     MCV 09/22/2024 90     MCH 09/22/2024 30.6     MCHC 09/22/2024 33.9     RDW 09/22/2024 12.9     MPV 09/22/2024 11.1     Platelets 09/22/2024 187     nRBC 09/22/2024 0     Segmented % 09/22/2024 57     Immature Grans % 09/22/2024 1     Lymphocytes % 09/22/2024 31     Monocytes % 09/22/2024 7     Eosinophils Relative 09/22/2024 4     Basophils Relative 09/22/2024 0     Absolute Neutrophils 09/22/2024 3.18     Absolute Immature Grans 09/22/2024 0.03     Absolute Lymphocytes 09/22/2024 1.76     Absolute Monocytes 09/22/2024 0.41     Eosinophils Absolute 09/22/2024 0.25     Basophils Absolute 09/22/2024 0.02     Sodium 09/22/2024 139     Potassium 09/22/2024 3.5     Chloride 09/22/2024 102     CO2 09/22/2024 30     ANION GAP 09/22/2024 7     BUN 09/22/2024 9     Creatinine 09/22/2024 0.92     Glucose 09/22/2024 108     Calcium 09/22/2024 8.7     AST 09/22/2024 40 (H)     ALT 09/22/2024 54 (H)     Alkaline Phosphatase 09/22/2024 37     Total Protein 09/22/2024 6.1 (L)     Albumin 09/22/2024 4.1     Total Bilirubin 09/22/2024 0.58     eGFR 09/22/2024 102        Imaging Studies: I have personally reviewed pertinent imaging studies.

## 2024-09-22 NOTE — HOSPITAL COURSE
Hospital Course:  43 y/o M who presented with multiple episodes of hematochezia as well as 1 episode of vasovagal syncope while having a bowel movement.  He was hemodynamically stable.  Lab work was largely unremarkable.  During hospital stay, his hemoglobin dropped from 11.5 to 10.7. CT high volume bleeding scan was negative for any active bleeding. However, it was concerning for coloproctitis as well as incidental right renal pelvocaliectasis due to chronic staghorn calculus.  He was seen by GI and was not deemed to be a candidate for colonoscopy given recent colonoscopy 1 month ago.   He was seen by urology and was recommended to follow up as outpatient given lack of symptoms. New discharge medications include Protonix 40 mg daily.  Plan for outpatient follow-up with primary care physician within 1 week.  Plan for follow-up with patient's own gastroenterologist, Dr. Price and [facility]'s urology.  Stable for discharge.    Significant Findings:  - CT high volume bleeding scan A/P (9/21) - Suspected mild sigmoid and rectal wall thickening is at least partially accentuated by underdistention but concerning for coloproctitis, given reported history. No evidence for associated high volume GI bleeding. Consider further evaluation with colonoscopy. Moderate right renal pelvocaliectasis. Association with staghorn calculus suggests this is chronic and related to recurrent infections. Correlate for laboratory evidence of active UTI.

## 2024-09-22 NOTE — UTILIZATION REVIEW
Initial Clinical Review    Admission: Date/Time/Statement:   Admission Orders (From admission, onward)       Ordered        09/21/24 0138  INPATIENT ADMISSION  Once                          Orders Placed This Encounter   Procedures    INPATIENT ADMISSION     Standing Status:   Standing     Number of Occurrences:   1     Order Specific Question:   Level of Care     Answer:   Med Surg [16]     Order Specific Question:   Estimated length of stay     Answer:   More than 2 Midnights     Order Specific Question:   Certification     Answer:   I certify that inpatient services are medically necessary for this patient for a duration of greater than two midnights. See H&P and MD Progress Notes for additional information about the patient's course of treatment.     ED Arrival Information       Expected   -    Arrival   9/20/2024 21:03    Acuity   Emergent              Means of arrival   Ambulance    Escorted by   [facility]    Service   Hospitalist    Admission type   Emergency              Arrival complaint   ems             Chief Complaint   Patient presents with    Rectal Bleeding     Pt just got home from 15 hour flight from [place], had episode of bright red blood in toilet, laid down on floor after and syncopized, also had episode of syncope for EMS. Pt c/o lower abdominal pain, dizziness, weakness, denies nausea       Initial Presentation: 44 y.o. male  to ED via EMS from home.    Admitted to inpatient with Dx: Hematochezia/Hydronephrosis/Syncope.  Presented to ED with  passing bloody BM with 2 episodes of syncope while on toilet starting about 11 am on day of arrival.  Has 15 to 20 small volume bowel movements.   Intake poor.  Returned from [place] today.    Colonoscopy done 8/30 revealed hemorrhoids on perianal exam, 3 mm polyp in the cecum, diverticulosis in the cecum, and prolapsed internal hemorrhoids were banded.   PMHx: epilepsy and anal fissure . On exam: acute distress.  Appears ill. Internal  hemorrhoid.  Rectum guaiac result positive.  H&H 11.5/35.2.    Imaging shows concern of coloproctitis, no high volume GI bleed;Incidental right renal pelvocaliectasis with staghorn calculus.   on ct abdomen.  .ED treatment:  IVF bolus.    Plan includes  to trend hemoglobin.   Clears.  Consult GI.   Stool studies.  IVF.  Check orthostatic signs.   Urology consulted.      9/21/24 per GI - Painless hematochezia: Possibly due to an rectal ulcer in setting of recent banding versus proctitis versus self-limited diverticular bleed versus infectious proctocolitis/GERD.   Plan is monitor H&H.  Check stool studies - O&P, Stool PCR.  IVF.  Hold colonoscopy at this time, if becomes unstable or recurrent bleeding, will re consider.  Clears.  Start Protonix    9/21/24 per urology - hydronephrosis of right kidney secondary to staghorn calculus.   Recommend OP follow up to discuss elective stone treatment.   No  intervention at this time .    Date: 9/22/24    Day 2:  no further bleeding.  Normal, BM today.   On exam abdomen soft.  H&H 10.1/29.8.  Plan is advance diet.  Follow up stool studies.   No colonoscopy at this time.     ED Triage Vitals   Temperature Pulse Respirations Blood Pressure SpO2 Pain Score   09/20/24 2107 09/20/24 2107 09/20/24 2107 09/20/24 2107 09/20/24 2107 09/21/24 1537   97.7 °F (36.5 °C) 90 16 122/88 100 % No Pain     Weight (last 2 days)       None            Vital Signs (last 3 days)       Date/Time Temp Pulse Resp BP MAP (mmHg) SpO2 O2 Device Patient Position - Orthostatic VS [place] Coma Scale Score Pain    09/22/24 08:29:54 98.6 °F (37 °C) 75 16 140/91 107 100 % -- -- -- --    09/21/24 22:26:14 97.5 °F (36.4 °C) 57 16 116/78 91 97 % None (Room air) -- -- --    09/21/24 2050 -- -- -- -- -- -- -- -- 15 No Pain    09/21/24 1537 -- -- -- -- -- -- None (Room air) -- 15 No Pain    09/21/24 15:26:27 98.5 °F (36.9 °C) 71 16 140/97 111 99 % -- -- -- --    09/21/24 1354 98.5 °F (36.9 °C) 73 16 140/94 -- 98 % --  -- -- --    09/21/24 1045 -- 85 -- 150/90 114 99 % -- -- -- --    09/21/24 0433 -- -- -- -- -- -- -- -- 15 --    09/21/24 0230 -- 68 16 118/88 100 98 % None (Room air) Sitting -- --    09/21/24 0200 -- 84 16 112/72 87 98 % None (Room air) Sitting -- --    09/21/24 0100 -- 72 16 103/74 85 98 % None (Room air) Sitting -- --    09/21/24 0030 -- 72 16 107/70 84 97 % None (Room air) Sitting -- --    09/21/24 0000 -- 76 16 110/70 84 94 % None (Room air) Sitting -- --    09/20/24 2330 -- 89 16 107/69 82 96 % None (Room air) Sitting -- --    09/20/24 2300 -- 84 16 117/77 93 100 % None (Room air) Sitting -- --    09/20/24 2200 -- -- -- 100/59 -- -- -- -- -- --    09/20/24 2155 -- -- -- 104/57 -- -- -- -- -- --    09/20/24 2121 -- -- -- -- -- -- -- -- 15 --    09/20/24 2107 97.7 °F (36.5 °C) 90 16 122/88 99 100 % None (Room air) -- -- --            Pertinent Labs/Diagnostic Test Results:   Radiology:  CT high volume bleeding scan abdomen pelvis   Final Interpretation by Anmol Amador MD (09/21 0721)      1.  Suspected mild sigmoid and rectal wall thickening is at least partially accentuated by underdistention but concerning for coloproctitis, given reported history. No evidence for associated high volume GI bleeding. Consider further evaluation with    colonoscopy.      2.  Moderate right renal pelvocaliectasis. Association with staghorn calculus suggests this is chronic and related to recurrent infections. Correlate for laboratory evidence of active UTI.      Note: The study was marked in EPIC for immediate notification. Colonoscopic follow-up reminder notification was scheduled in the electronic medical record.         Workstation performed: [id number]           Cardiology:  ECG 12 lead   Final Result by Davida Harrison MD (09/21 8766)   Normal sinus rhythm   Normal ECG   When compared with ECG of 22-OCT-2023 15:03,   Nonspecific T wave abnormality now evident in Inferior leads   Confirmed by Davida Harrison ([id number]) on  9/21/2024 6:55:45 PM        GI:  No orders to display       Results from last 7 days   Lab Units 09/22/24 0437 09/21/24 0415 09/20/24 2115   WBC Thousand/uL 5.65 8.53 6.99   HEMOGLOBIN g/dL 10.1* 10.7* 11.5*   HEMATOCRIT % 29.8* 32.2* 35.2*   PLATELETS Thousands/uL 187 195 208   TOTAL NEUT ABS Thousands/µL 3.18 5.86 4.09     Results from last 7 days   Lab Units 09/22/24 0437 09/21/24 0415 09/20/24 2115   SODIUM mmol/L 139 138 139   POTASSIUM mmol/L 3.5 4.2 3.5   CHLORIDE mmol/L 102 104 105   CO2 mmol/L 30 27 24   ANION GAP mmol/L 7 7 10   BUN mg/dL 9 11 13   CREATININE mg/dL 0.92 0.89 1.03   EGFR ml/min/1.73sq m 102 105 89   CALCIUM mg/dL 8.7 8.8 9.2     Results from last 7 days   Lab Units 09/22/24 0437 09/20/24 2115   AST U/L 40* 40*   ALT U/L 54* 51   ALK PHOS U/L 37 42   TOTAL PROTEIN g/dL 6.1* 6.2*   ALBUMIN g/dL 4.1 4.2   TOTAL BILIRUBIN mg/dL 0.58 0.52     Results from last 7 days   Lab Units 09/22/24 0437 09/21/24 0415 09/20/24 2115   GLUCOSE RANDOM mg/dL 108 111 153*     Results from last 7 days   Lab Units 09/20/24 2115   D-DIMER QUANTITATIVE ug/ml FEU <0.27     Results from last 7 days   Lab Units 09/20/24 2115   PROTIME seconds 13.3   INR  0.94   PTT seconds 24     Results from last 7 days   Lab Units 09/20/24 2115   LIPASE u/L 42     Results from last 7 days   Lab Units 09/21/24  1516   CLARITY UA  Clear   COLOR UA  Colorless   SPEC GRAV UA  1.007   PH UA  6.0   GLUCOSE UA mg/dl Negative   KETONES UA mg/dl Negative   BLOOD UA  Small*   PROTEIN UA mg/dl Negative   NITRITE UA  Negative   BILIRUBIN UA  Negative   UROBILINOGEN UA (BE) mg/dl <2.0   LEUKOCYTES UA  Trace*   WBC UA /hpf 2-4*   RBC UA /hpf 2-4*   BACTERIA UA /hpf None Seen   EPITHELIAL CELLS WET PREP /hpf None Seen       ED Treatment-Medication Administration from 09/20/2024 2103 to 09/21/2024 1352         Date/Time Order Dose Route Action     09/20/2024 2219 lactated ringers bolus 500 mL 500 mL Intravenous New Bag     09/21/2024  0415 lactated ringers infusion 75 mL/hr Intravenous New Bag     09/21/2024 0929 hydrocortisone (ANUSOL-HC) 2.5 % rectal cream -- Topical Given     09/21/2024 1040 patient supplied medication 1 each Oral Given            Past Medical History:   Diagnosis Date    Generalized seizure disorder (HCC)     Hip strain, left, subsequent encounter     Seizures (HCC)     Shoulder strain, left, subsequent encounter      Present on Admission:   Generalized seizure disorder (HCC)   Hydronephrosis of right kidney      Admitting Diagnosis: Hematochezia [K92.1]  Syncope [R55]  Rectal bleeding [K62.5]  Anemia [D64.9]  BRBPR (bright red blood per rectum) [K62.5]  Staghorn kidney stones [N20.0]  Hydronephrosis of right kidney [N13.30]  Age/Sex: 44 y.o. male  Admission Orders:  Scheduled Medications:  enoxaparin, 40 mg, Subcutaneous, Daily  hydrocortisone, , Topical, BID  pantoprazole, 40 mg, Oral, Daily Before Breakfast  patient supplied medication, 1 each, Oral, QAM    pantoprazole (PROTONIX) injection 40 mg  Dose: 40 mg  Freq: Every 24 hours scheduled Route: IV  Start: 09/21/24 1445 End: 09/21/24 2032     Continuous IV Infusions:  lactated ringers infusion  Rate: 75 mL/hr Dose: 75 mL/hr  Freq: Continuous Route: IV  Indications of Use: IV Hydration  Last Dose: Stopped (09/21/24 1048)  Start: 09/21/24 0315 End: 09/21/24 0930     PRN Meds: not used.   acetaminophen, 650 mg, Oral, Q6H PRN    Orthostatic BP  9/21/24 clears     IP CONSULT TO GASTROENTEROLOGY  IP CONSULT TO UROLOGY    Network Utilization Review Department  ATTENTION: Please call with any questions or concerns to [phone] and carefully listen to the prompts so that you are directed to the right person. All voicemails are confidential.   For Discharge needs, contact Care Management DC Support Team at [phone] opt. 2  Send all requests for admission clinical reviews, approved or denied determinations and any other requests to dedicated fax number below belonging to the  [place] where the patient is receiving treatment. List of dedicated fax numbers for the Facilities:  FACILITY NAME UR FAX NUMBER   ADMISSION DENIALS (Administrative/Medical Necessity) [phone]   DISCHARGE SUPPORT TEAM (NETWORK) [phone]   PARENT CHILD HEALTH (Maternity/NICU/Pediatrics) [phone]   [facility] [phone]   [facility] [phone]   [facility] [phone]   [facility] [phone]   [facility] [phone]   [facility] [phone]   [facility] [phone]   [facility] [phone]   [facility] [phone]   [facility] [phone]   [facility] [phone]   [facility] [phone]

## 2024-09-22 NOTE — PLAN OF CARE
Problem: GASTROINTESTINAL - ADULT  Goal: Maintains or returns to baseline bowel function  Description: INTERVENTIONS:  - Assess bowel function  - Encourage oral fluids to ensure adequate hydration  - Administer IV fluids if ordered to ensure adequate hydration  - Administer ordered medications as needed  - Encourage mobilization and activity  - Consider nutritional services referral to assist patient with adequate nutrition and appropriate food choices  Outcome: Progressing     Problem: HEMATOLOGIC - ADULT  Goal: Maintains hematologic stability  Description: INTERVENTIONS  - Assess for signs and symptoms of bleeding or hemorrhage  - Monitor labs  - Administer supportive blood products/factors as ordered and appropriate  Outcome: Progressing     Problem: PAIN - ADULT  Goal: Verbalizes/displays adequate comfort level or baseline comfort level  Description: Interventions:  - Encourage patient to monitor pain and request assistance  - Assess pain using appropriate pain scale  - Administer analgesics based on type and severity of pain and evaluate response  - Implement non-pharmacological measures as appropriate and evaluate response  - Consider cultural and social influences on pain and pain management  - Notify physician/advanced practitioner if interventions unsuccessful or patient reports new pain  Outcome: Progressing     Problem: DISCHARGE PLANNING  Goal: Discharge to home or other facility with appropriate resources  Description: INTERVENTIONS:  - Identify barriers to discharge w/patient and caregiver  - Arrange for needed discharge resources and transportation as appropriate  - Identify discharge learning needs (meds, wound care, etc.)  - Arrange for interpretive services to assist at discharge as needed  - Refer to Case Management Department for coordinating discharge planning if the patient needs post-hospital services based on physician/advanced practitioner order or complex needs related to functional  status, cognitive ability, or social support system  Outcome: Progressing

## 2024-09-22 NOTE — DISCHARGE INSTR - AVS FIRST PAGE
Dear Marjorie Castro,     It was our pleasure to care for you here at [facility].  It is our hope that we were always able to exceed the expected standards for your care during your stay.  You were hospitalized due to rectal bleeding.  You were cared for on the 4th floor by Per Parham DO under the service of Jesus Ware MD with the [facility] who covers for your primary care physician (PCP), Sharon Baltazar MD, while you were hospitalized.  If you have any questions or concerns related to this hospitalization, you may contact us at .  For follow up as well as any medication refills, we recommend that you follow up with your primary care physician.  A registered nurse will reach out to you by phone within a few days after your discharge to answer any additional questions that you may have after going home.  However, at this time we provide for you here, the most important instructions / recommendations at discharge:     Notable Medication Adjustments -   Start taking Protonix 40 mg daily.  Testing Required after Discharge -   We recommend getting repeat CBC (to check blood counts) with primary care physician.  ** Please contact your PCP to request testing orders for any of the testing recommended here **  Important follow up information -   Follow-up with your primary care physician within 1 week.  Follow-up with your gastroenterologist, Dr. Price.  Please call [phone] to schedule an appointment with [facility] urology.  Other Instructions -   Return to the emergency department if you have worsening symptoms.  Please review this entire after visit summary as additional general instructions including medication list, appointments, activity, diet, any pertinent wound care, and other additional recommendations from your care team that may be provided for you.      Sincerely,     Per Parham DO

## 2024-09-22 NOTE — INCIDENTAL FINDINGS
The following findings require follow up:  Radiographic finding   Finding: staghorn calculus   Follow up required: yes    Follow up should be done within 3  month(s)    Please notify the following clinician to assist with the follow up:   Dr. Hoang Castro with Urology     Incidental finding results were discussed with the Patient by Vj Guerrero MD on 09/22/24.   They expressed understanding and all questions answered.

## 2024-09-22 NOTE — ASSESSMENT & PLAN NOTE
Pt with painless rectal bleeding with bright red blood/clots and drop in hemoglobin from baseline 13.6 to 11.5 with presyncopal episode most concerning for bleeding from rectal ulcer from banding last month vs self limited diverticular or proctocolitis as seen on CT although less likely w/ normal WBC, no fevers.  He hasn't had any further bleeding since Friday, reports having a formed brown stool this AM. Hgb stable 10.1 from 10.7 last night.     -Okay to advance diet  -Follow up stool studies  -No need for repeat colonoscopy at this time as he had recent colonoscopy 8/30 and bleeding has subsided  -Ok for dc from GI standpoint

## 2024-09-23 LAB
C COLI+JEJUNI TUF STL QL NAA+PROBE: NEGATIVE
EC STX1+STX2 GENES STL QL NAA+PROBE: NEGATIVE
SALMONELLA SP SPAO STL QL NAA+PROBE: NEGATIVE
SHIGELLA SP+EIEC IPAH STL QL NAA+PROBE: NEGATIVE

## 2024-09-23 NOTE — DISCHARGE SUMMARY
Discharge Summary - Hospitalist   Name: Marjorie Castro 44 y.o. male I MRN: [id number]  Unit/Bed#: S -01 I Date of Admission: 9/20/2024   Date of Service: 9/22/2024 I Hospital Day: 1     Assessment & Plan  Hematochezia  Patient presenting with hematochezia since 11 AM on morning of presentation, however, has had no episodes since presentation.  Recent colonoscopy 8/30 revealed hemorrhoids on perianal exam, 3 mm polyp in the cecum, diverticulosis in the cecum, and prolapsed internal hemorrhoids were banded. 2 point hemoglobin drop 13-11 from prior Hgb in October 2023 to presentation.  Patient has had recent travel from [place], no known sick contacts.    Suspect lower GI bleed secondary to internal hemorrhoids versus diverticulosis.  Reassuringly, patient is remains hemodynamically stable and is not currently showing any overt signs of bleeding.    Pt feeling subjectively better despite slowly down-trending Hgb    Plan:  Obtain stool sample, if possible, for enteric pathogen panel and infectious workup  Follow-up stool parasite and ova  Follow-up enteric bacterial panel  Consulted GI, appreciate recs  Gentle hydration 75isolyte for 10 hour  Follow-up CBC every morning  Hydronephrosis of right kidney  Staghorn calculus with associated right-sided hydronephrosis visualized on abdominal CT. Patient's kidney function is reasuringly stable, and patient is not currently having flank pain or urinary symptoms, overall asymptomatic.    Plan:  Urology consulted and recommend outpatient follow-up in Uro clinic  Generalized seizure disorder (HCC)  Continue patient valproate to 50 mg daily in the morning.     Medical Problems       Resolved Problems  Date Reviewed: 10/22/2023   None       Discharging Physician / Practitioner: Ghulam Singer MD  PCP: Sharon Baltazar MD  Admission Date:   Admission Orders (From admission, onward)       Ordered        09/21/24 0138  INPATIENT ADMISSION  Once                          Discharge Date:  09/22/24    Consultations During Hospital Stay:  Gastroenterology  Urology    Procedures Performed:   None    Significant Findings / Test Results:   CT high volume bleeding scan abdomen pelvis 9/21/2024:  1.  Suspected mild sigmoid and rectal wall thickening is at least partially accentuated by underdistention but concerning for coloproctitis, given reported history. No evidence for associated high volume GI bleeding. Consider further evaluation with colonoscopy. 2.  Moderate right renal pelvocaliectasis. Association with staghorn calculus suggests this is chronic and related to recurrent infections. Correlate for laboratory evidence of active UTI.  Mild anemia, normocytic - new since October 2023    Incidental Findings:   Staghorn Calculus, as noted above  Uro consulted  Pt to f/u with Uro in clinic     Test Results Pending at Discharge (will require follow up):   Stool enteric bacteria panel by PCR  Stool ova and parasite examination     Outpatient Tests Requested:  CBC to monitor Hgb    Complications:  None    Reason for Admission: Hematochezia    Hospital Course:  43 y/o M who presented with multiple episodes of hematochezia as well as 1 episode of vasovagal syncope while having a bowel movement.  He was hemodynamically stable.  Lab work was largely unremarkable.  During hospital stay, his hemoglobin dropped from 11.5 to 10.1 from Hospital Day 0 to Hospital Day 2. CT high volume bleeding scan was negative for any active bleeding. However, it was concerning for coloproctitis as well as incidental right renal pelvocaliectasis due to chronic staghorn calculus.  He was seen by GI and was not deemed to be a candidate for colonoscopy given recent colonoscopy 1 month ago.   He was seen by urology and was recommended to follow up as outpatient given lack of symptoms. New discharge medications include Protonix 40 mg daily.  Plan for outpatient follow-up with primary care physician within 1 week.  Plan for follow-up with  patient's own gastroenterologist, Dr. Price and [facility]'s urology.  Pt feels subjectively improved when assessed on hospital day 2 and eager for discharge to home. He is medically stable for discharge to home.    Please see above list of diagnoses and related plan for additional information.     Condition at Discharge: good    Discharge Day Visit / Exam:   Subjective:  This morning, Mr. Castro is seen sitting up in bed, awake, alert, engaged with exam, in no acute distress. He reports feeling better this morning, states he has more energy compared to priors. Denies fevers, chills, sweats, CP, SOB, Abd pain, N/V/D. Reports good appetite. States he had a normal BM at 05:30 this morning, denies jenae blood in stool. Reports voiding bladder volitionally, denies change in urinary habits, denies flank pain.  Vitals: Blood Pressure: 140/91 (09/22/24 0829)  Pulse: 75 (09/22/24 0829)  Temperature: 98.6 °F (37 °C) (09/22/24 0829)  Temp Source: Oral (09/20/24 2107)  Respirations: 16 (09/22/24 0829)  SpO2: 100 % (09/22/24 0829)  Exam:   Physical Exam  Vitals and nursing note reviewed.   Constitutional:       General: He is not in acute distress.     Appearance: Normal appearance. He is not ill-appearing, toxic-appearing or diaphoretic.   HENT:      Head: Normocephalic and atraumatic.   Cardiovascular:      Rate and Rhythm: Normal rate and regular rhythm.      Pulses: Normal pulses.      Heart sounds: Normal heart sounds. No murmur heard.     No friction rub. No gallop.   Pulmonary:      Effort: Pulmonary effort is normal. No respiratory distress.      Breath sounds: Normal breath sounds. No stridor. No wheezing, rhonchi or rales.   Chest:      Chest wall: No tenderness.   Abdominal:      General: Bowel sounds are normal. There is no distension.      Palpations: Abdomen is soft.      Tenderness: There is no abdominal tenderness. There is no right CVA tenderness, left CVA tenderness, guarding or rebound.   Musculoskeletal:          General: No swelling, tenderness, deformity or signs of injury.      Right lower leg: No edema.      Left lower leg: No edema.   Skin:     General: Skin is warm and dry.   Neurological:      Mental Status: He is alert.   Psychiatric:         Mood and Affect: Mood normal.         Behavior: Behavior normal.        Discussion with Family: Patient declined call to .     Discharge instructions/Information to patient and family:   See after visit summary for information provided to patient and family.      Provisions for Follow-Up Care:  See after visit summary for information related to follow-up care and any pertinent home health orders.      Mobility at time of Discharge:   Basic Mobility Inpatient Raw Score: 24  JH-HLM Goal: 8: Walk 250 feet or more  JH-HLM Achieved: 8: Walk 250 feet ot more  HLM Goal achieved. Continue to encourage appropriate mobility.     Disposition:   Home    Planned Readmission: None    Discharge Medications:  See after visit summary for reconciled discharge medications provided to patient and/or family.      Administrative Statements     **Please Note: This note may have been constructed using a voice recognition system**

## 2024-09-23 NOTE — ASSESSMENT & PLAN NOTE
Staghorn calculus with associated right-sided hydronephrosis visualized on abdominal CT. Patient's kidney function is reasuringly stable, and patient is not currently having flank pain or urinary symptoms, overall asymptomatic.    Plan:  Urology consulted and recommend outpatient follow-up in Uro clinic

## 2024-09-23 NOTE — UTILIZATION REVIEW
Unable to start the authorization process through the [employer] portal due to portal issues.        NOTIFICATION OF INPATIENT ADMISSION   AUTHORIZATION REQUEST   SERVICING FACILITY:   [place] [address]  Tax ID: [id number]  NPI: [id number]   ATTENDING PROVIDER:  Attending Name and NPI#: Vj Guerrero Md [[id number]]  Address: [address] [place] [address]  Phone: [phone]     ADMISSION INFORMATION:  Place of Service: Inpatient [facility]  Place of Service Code: 21  Inpatient Admission Date/Time: 9/21/24  1:38 AM  Discharge Date/Time: 9/22/2024  1:12 PM  Admitting Diagnosis Code/Description:  Hematochezia [K92.1]  Syncope [R55]  Rectal bleeding [K62.5]  Anemia [D64.9]  BRBPR (bright red blood per rectum) [K62.5]  Staghorn kidney stones [N20.0]  Hydronephrosis of right kidney [N13.30]     UTILIZATION REVIEW CONTACT:  Olimpia Leong Utilization   Network Utilization Review Department  Phone: [phone]  Fax: [phone]  Email: [email or web address]  Contact for approvals/pending authorizations, clinical reviews, and discharge.     PHYSICIAN ADVISORY SERVICES:  Medical Necessity Denial & [id number]-vl-Ckkx Review  Phone: [phone]  Fax: [phone]  Email: [email or web address]     DISCHARGE SUPPORT TEAM:  For Patients Discharge Needs & Updates  Phone: [phone] opt. [phone] Fax: [phone]  Email: [email or web address]

## 2024-09-23 NOTE — ASSESSMENT & PLAN NOTE
Patient presenting with hematochezia since 11 AM on morning of presentation, however, has had no episodes since presentation.  Recent colonoscopy 8/30 revealed hemorrhoids on perianal exam, 3 mm polyp in the cecum, diverticulosis in the cecum, and prolapsed internal hemorrhoids were banded. 2 point hemoglobin drop 13-11 from prior Hgb in October 2023 to presentation.  Patient has had recent travel from [place], no known sick contacts.    Suspect lower GI bleed secondary to internal hemorrhoids versus diverticulosis.  Reassuringly, patient is remains hemodynamically stable and is not currently showing any overt signs of bleeding.    Pt feeling subjectively better despite slowly down-trending Hgb    Plan:  Obtain stool sample, if possible, for enteric pathogen panel and infectious workup  Follow-up stool parasite and ova  Follow-up enteric bacterial panel  Consulted GI, appreciate recs  Gentle hydration 75isolyte for 10 hour  Follow-up CBC every morning

## 2024-10-11 ENCOUNTER — APPOINTMENT (OUTPATIENT)
Dept: LAB | Facility: AMBULARY SURGERY CENTER | Age: 44
End: 2024-10-11
Payer: COMMERCIAL

## 2024-10-11 ENCOUNTER — TELEPHONE (OUTPATIENT)
Dept: UROLOGY | Facility: CLINIC | Age: 44
End: 2024-10-11

## 2024-10-11 ENCOUNTER — OFFICE VISIT (OUTPATIENT)
Dept: UROLOGY | Facility: CLINIC | Age: 44
End: 2024-10-11
Payer: COMMERCIAL

## 2024-10-11 VITALS
HEART RATE: 92 BPM | WEIGHT: 193.8 LBS | BODY MASS INDEX: 34.34 KG/M2 | DIASTOLIC BLOOD PRESSURE: 92 MMHG | SYSTOLIC BLOOD PRESSURE: 142 MMHG | OXYGEN SATURATION: 96 % | HEIGHT: 63 IN

## 2024-10-11 DIAGNOSIS — R11.0 NAUSEA: ICD-10-CM

## 2024-10-11 DIAGNOSIS — R10.9 RIGHT FLANK PAIN: ICD-10-CM

## 2024-10-11 DIAGNOSIS — N20.0 NEPHROLITHIASIS: ICD-10-CM

## 2024-10-11 DIAGNOSIS — N13.30 HYDRONEPHROSIS OF RIGHT KIDNEY: Primary | ICD-10-CM

## 2024-10-11 LAB
ANION GAP SERPL CALCULATED.3IONS-SCNC: 9 MMOL/L (ref 4–13)
BASOPHILS # BLD AUTO: 0.03 THOUSANDS/ΜL (ref 0–0.1)
BASOPHILS NFR BLD AUTO: 1 % (ref 0–1)
BUN SERPL-MCNC: 9 MG/DL (ref 5–25)
CALCIUM SERPL-MCNC: 9.9 MG/DL (ref 8.4–10.2)
CHLORIDE SERPL-SCNC: 104 MMOL/L (ref 96–108)
CO2 SERPL-SCNC: 26 MMOL/L (ref 21–32)
CREAT SERPL-MCNC: 0.65 MG/DL (ref 0.6–1.3)
EOSINOPHIL # BLD AUTO: 0.28 THOUSAND/ΜL (ref 0–0.61)
EOSINOPHIL NFR BLD AUTO: 5 % (ref 0–6)
ERYTHROCYTE [DISTWIDTH] IN BLOOD BY AUTOMATED COUNT: 13 % (ref 11.6–15.1)
GFR SERPL CREATININE-BSD FRML MDRD: 119 ML/MIN/1.73SQ M
GLUCOSE P FAST SERPL-MCNC: 105 MG/DL (ref 65–99)
HCT VFR BLD AUTO: 36.3 % (ref 36.5–49.3)
HGB BLD-MCNC: 11.3 G/DL (ref 12–17)
IMM GRANULOCYTES # BLD AUTO: 0.04 THOUSAND/UL (ref 0–0.2)
IMM GRANULOCYTES NFR BLD AUTO: 1 % (ref 0–2)
LYMPHOCYTES # BLD AUTO: 1.69 THOUSANDS/ΜL (ref 0.6–4.47)
LYMPHOCYTES NFR BLD AUTO: 32 % (ref 14–44)
MCH RBC QN AUTO: 28.6 PG (ref 26.8–34.3)
MCHC RBC AUTO-ENTMCNC: 31.1 G/DL (ref 31.4–37.4)
MCV RBC AUTO: 92 FL (ref 82–98)
MONOCYTES # BLD AUTO: 0.57 THOUSAND/ΜL (ref 0.17–1.22)
MONOCYTES NFR BLD AUTO: 11 % (ref 4–12)
NEUTROPHILS # BLD AUTO: 2.65 THOUSANDS/ΜL (ref 1.85–7.62)
NEUTS SEG NFR BLD AUTO: 50 % (ref 43–75)
NRBC BLD AUTO-RTO: 0 /100 WBCS
PLATELET # BLD AUTO: 313 THOUSANDS/UL (ref 149–390)
PMV BLD AUTO: 10.6 FL (ref 8.9–12.7)
POTASSIUM SERPL-SCNC: 4 MMOL/L (ref 3.5–5.3)
RBC # BLD AUTO: 3.95 MILLION/UL (ref 3.88–5.62)
SL AMB  POCT GLUCOSE, UA: NORMAL
SL AMB LEUKOCYTE ESTERASE,UA: NORMAL
SL AMB POCT BILIRUBIN,UA: NORMAL
SL AMB POCT BLOOD,UA: NORMAL
SL AMB POCT CLARITY,UA: CLEAR
SL AMB POCT COLOR,UA: YELLOW
SL AMB POCT KETONES,UA: NORMAL
SL AMB POCT NITRITE,UA: NORMAL
SL AMB POCT PH,UA: 7
SL AMB POCT SPECIFIC GRAVITY,UA: 1.02
SL AMB POCT URINE PROTEIN: NORMAL
SL AMB POCT UROBILINOGEN: 0.2
SODIUM SERPL-SCNC: 139 MMOL/L (ref 135–147)
WBC # BLD AUTO: 5.26 THOUSAND/UL (ref 4.31–10.16)

## 2024-10-11 PROCEDURE — 81002 URINALYSIS NONAUTO W/O SCOPE: CPT

## 2024-10-11 PROCEDURE — 85025 COMPLETE CBC W/AUTO DIFF WBC: CPT

## 2024-10-11 PROCEDURE — 99203 OFFICE O/P NEW LOW 30 MIN: CPT

## 2024-10-11 PROCEDURE — 87086 URINE CULTURE/COLONY COUNT: CPT

## 2024-10-11 PROCEDURE — 36415 COLL VENOUS BLD VENIPUNCTURE: CPT

## 2024-10-11 PROCEDURE — 80048 BASIC METABOLIC PNL TOTAL CA: CPT

## 2024-10-11 RX ORDER — OXYCODONE HYDROCHLORIDE 10 MG/1
10 TABLET ORAL EVERY 6 HOURS PRN
Qty: 7 TABLET | Refills: 0 | Status: SHIPPED | OUTPATIENT
Start: 2024-10-11

## 2024-10-11 RX ORDER — ONDANSETRON 4 MG/1
4 TABLET, FILM COATED ORAL EVERY 8 HOURS PRN
Qty: 20 TABLET | Refills: 0 | Status: SHIPPED | OUTPATIENT
Start: 2024-10-11

## 2024-10-11 RX ORDER — CEPHALEXIN 500 MG/1
500 CAPSULE ORAL EVERY 8 HOURS SCHEDULED
Qty: 21 CAPSULE | Refills: 0 | Status: SHIPPED | OUTPATIENT
Start: 2024-10-11 | End: 2024-10-18

## 2024-10-11 NOTE — PROGRESS NOTES
"10/11/2024      No chief complaint on file.        Assessment and Plan    44 y.o. male managed by NEW PATIENT    Staghorn calculus  Hydronephrosis of right kidney  -CT abd pelvis (9/20/24) showing right moderate to severe pelvocaliectasis. Right appears within allowable limits of caliber. There are multiple nephroliths and calyceal/pelvic casting-type (staghorn) stones in the upper collecting system. No ureteral stones. No urolithiasis on the left. No suspicious renal masses or perinephric collections.  -patient is now symptomatic with right flank pain, nausea. No fevers or chills.  -urine dip is negative for leukocytes, nitrites, and blood.   -urine sent for culture. Started on empiric abx  -Repeat labs ordered  -Oxycodone, zofran sent to pharmacy.  -ED precautions  -Case request placed to be fast tracked    History of Present Illness  Marjorie Castro is a 44 y.o. male here for evaluation of nephrolithiasis.    Patient admitted on 9/20/24 with hematochezia and syncopal episode and 2 point hgb drop from 13-11.     CT abd pelvis showed staghorn calculus with associated right-sided hydronephrosis. Patient's kidney function was stable. Patient was completely asymptomatic at that time.    Creatinine 0.92, eGFR 102 (9/22/24)    Patient having right flank pain that started Saturday. He also states that he is having pain when he urinates. He states this started on Saturday when he started having flank pain. No fevers or chills. He states he is nauseous and that this morning he \"threw up a little bit\".  He states he has been taking tylenol, this has not helped.     Patient states that 15 years ago, he had a ureteroscopy at [facility] [place]. He has not been followed by urology.           Review of Systems   Constitutional:  Negative for activity change, chills, fatigue and fever.   HENT:  Negative for congestion, rhinorrhea and sore throat.    Eyes:  Negative for photophobia, redness and visual disturbance.   Respiratory:  " "Negative for cough, shortness of breath and wheezing.    Cardiovascular:  Negative for chest pain, palpitations and leg swelling.   Gastrointestinal:  Positive for nausea and vomiting. Negative for abdominal pain and diarrhea.   Genitourinary:  Positive for dysuria and flank pain. Negative for frequency, hematuria and urgency.   Neurological:  Negative for weakness, light-headedness and headaches.           AUA SYMPTOM SCORE      Flowsheet Row Most Recent Value   AUA SYMPTOM SCORE    How often have you had a sensation of not emptying your bladder completely after you finished urinating? 5 (P)     How often have you had to urinate again less than two hours after you finished urinating? 5 (P)     How often have you found you stopped and started again several times when you urinate? 4 (P)     How often have you found it difficult to postpone urination? 3 (P)     How often have you had a weak urinary stream? 5 (P)     How often have you had to push or strain to begin urination? 4 (P)     How many times did you most typically get up to urinate from the time you went to bed at night until the time you got up in the morning? 5 (P)     Quality of Life: If you were to spend the rest of your life with your urinary condition just the way it is now, how would you feel about that? 6 (P)     AUA SYMPTOM SCORE 31 (P)               Vitals  Vitals:    10/11/24 0808   BP: 142/92   BP Location: Left arm   Patient Position: Sitting   Cuff Size: Large   Pulse: 92   SpO2: 96%   Weight: 87.9 kg (193 lb 12.8 oz)   Height: 5' 3\" (1.6 m)       Physical Exam  Constitutional:       Appearance: Normal appearance. He is not toxic-appearing.   HENT:      Head: Normocephalic.      Mouth/Throat:      Pharynx: Oropharynx is clear.   Eyes:      Extraocular Movements: Extraocular movements intact.      Pupils: Pupils are equal, round, and reactive to light.   Pulmonary:      Effort: Pulmonary effort is normal. No respiratory distress. "   Musculoskeletal:         General: Normal range of motion.      Cervical back: Normal range of motion.   Neurological:      Mental Status: He is alert and oriented to person, place, and time. Mental status is at baseline.      Gait: Gait normal.   Psychiatric:         Mood and Affect: Mood normal.         Behavior: Behavior normal.         Thought Content: Thought content normal.         Judgment: Judgment normal.           Past History  Past Medical History:   Diagnosis Date    Generalized seizure disorder (HCC)     Hip strain, left, subsequent encounter     Seizures (HCC)     Shoulder strain, left, subsequent encounter      Social History     Socioeconomic History    Marital status: /Civil Union     Spouse name: None    Number of children: None    Years of education: None    Highest education level: None   Occupational History    None   Tobacco Use    Smoking status: Never    Smokeless tobacco: Never   Vaping Use    Vaping status: Never Used   Substance and Sexual Activity    Alcohol use: Yes     Alcohol/week: 5.0 standard drinks of alcohol     Types: 5 Glasses of wine per week    Drug use: No    Sexual activity: None   Other Topics Concern    None   Social History Narrative    None     Social Determinants of Health     Financial Resource Strain: Low Risk  (8/23/2023)    Received from [facility]    Overall Financial Resource Strain (CARDIA)     Difficulty of Paying Living Expenses: Not very hard   Food Insecurity: No Food Insecurity (8/23/2023)    Received from [facility]    Hunger Vital Sign     Worried About Running Out of Food in the Last Year: Never true     Ran Out of Food in the Last Year: Never true   Transportation Needs: No Transportation Needs (8/23/2023)    Received from [facility]    PRAPARE - Transportation     Lack of Transportation (Medical): No     Lack of Transportation (Non-Medical): No   Physical Activity: Not on file   Stress: Not on  "file   Social Connections: Unknown (6/18/2024)    Received from [facility]     How often do you feel lonely or isolated from those around you? (Adult - for ages 18 years and over): Not on file   Intimate Partner Violence: Unknown (8/23/2023)    Received from [facility]    Humiliation, Afraid, Rape, and Kick questionnaire     Fear of Current or Ex-Partner: No     Emotionally Abused: No     Physically Abused: No     Sexually Abused: Not on file   Housing Stability: Not on file     Social History     Tobacco Use   Smoking Status Never   Smokeless Tobacco Never     Family History   Problem Relation Age of Onset    No Known Problems Mother     Hypertension Father     Diabetes Father     No Known Problems Brother        The following portions of the patient's history were reviewed and updated as appropriate: allergies, current medications, past medical history, past social history, past surgical history and problem list.    Results  Recent Results (from the past 1 hour(s))   POCT urine dip    Collection Time: 10/11/24  8:10 AM   Result Value Ref Range    LEUKOCYTE ESTERASE,UA -     NITRITE,UA -     SL AMB POCT UROBILINOGEN 0.2     POCT URINE PROTEIN -      PH,UA 7.0     BLOOD,UA -     SPECIFIC GRAVITY,UA 1.020     KETONES,UA -     BILIRUBIN,UA -     GLUCOSE, UA -      COLOR,UA yellow     CLARITY,UA clear    ]  No results found for: \"PSA\"  Lab Results   Component Value Date    CALCIUM 8.7 09/22/2024    K 3.5 09/22/2024    CO2 30 09/22/2024     09/22/2024    BUN 9 09/22/2024    CREATININE 0.92 09/22/2024     Lab Results   Component Value Date    WBC 5.65 09/22/2024    HGB 10.1 (L) 09/22/2024    HCT 29.8 (L) 09/22/2024    MCV 90 09/22/2024     09/22/2024       MONSE Pratt  "

## 2024-10-11 NOTE — TELEPHONE ENCOUNTER
I reviewed patient's CT images with Dr. Gates. Dr. Gates recommended stenting the patient first, then having a ureteroscopy. I called patient and spoke with him about this. When I spoke with patient and spouse earlier in the office today, he was consented for PCNL. Dr. Gates does not think that he will need this.    Given that patient is symptomatic, we will plan on placing priority to have right stent placement and then right staged ureteroscopy.    Patient aware to go to the ER for uncontrollable pain, fevers, and vomiting.

## 2024-10-12 LAB — BACTERIA UR CULT: NORMAL

## 2024-10-14 ENCOUNTER — TELEPHONE (OUTPATIENT)
Dept: UROLOGY | Facility: CLINIC | Age: 44
End: 2024-10-14

## 2024-10-14 NOTE — TELEPHONE ENCOUNTER
Spoke with patient and confirmed surgery date of:10/21/2024  Type of surgery:# 5 RIGHT  Operating physician: Dr. Gates  Location of surgery: [facility]    Verbally went over prep with patient on: 10/14/2024  NPO  Bowel prep? No  Hospital calls afternoon prior with arrival time -Calls Friday afternoon for Monday surgeries  Patient needs ride to and from surgery (outpatient/inpatient)   Pre-op testing to be done 2 weeks prior to surgery/UC DONE 10/11/2024/LABS DONE 9/21/2024  (list labs needed)  Blood thinners:   List blood thinner/how long needs to held or no hold needed  Clearances needed: (Medical/Cardiac/None)    Mailed/emailed to patient on:EMAILED TO PT 10/14/2024  Copy of packet scanned into Media on:10/14/2024  Labs in packet  Soap / Bowel prep in packet  Pre-op & Post-op in packet  Dates of H&P and post-op if needed    Consent: in Media or on admit  If needed:FAXED TO PAT 10/14/2024    Medical/Cardiac Clearance:  Appt with:  Appt date and time:  Date clearance form faxed:  Best fax number:    Medication Suspension of: Medication Name / how many days  Ordering provider:  Faxed Medication Suspension form on:  Best fax number:    [place]:  Faxed form to pharmacy on:    RBC blood bank done on:    Rep info:  Emailed rep on date:

## 2024-10-15 ENCOUNTER — ANESTHESIA EVENT (OUTPATIENT)
Dept: PERIOP | Facility: AMBULARY SURGERY CENTER | Age: 44
End: 2024-10-15
Payer: COMMERCIAL

## 2024-10-18 ENCOUNTER — TELEPHONE (OUTPATIENT)
Age: 44
End: 2024-10-18

## 2024-10-18 DIAGNOSIS — Z91.89 RISK FACTORS FOR OBSTRUCTIVE SLEEP APNEA: Primary | ICD-10-CM

## 2024-10-18 NOTE — TELEPHONE ENCOUNTER
Nurse named Paloma called on behalf of the patient to check to see if Patient is to take Keflex as prescribed 10/11/24 at office visit ? Urine culture came back negative.    Also, patient is still having right flank pain and oxycodone is not working.    Please advise.     # [phone] ext [phone] Paloma

## 2024-10-18 NOTE — PRE-PROCEDURE INSTRUCTIONS
Pre-Surgery Instructions:   Medication Instructions    Brivaracetam 100 MG TABS Take day of surgery.    ondansetron (ZOFRAN) 4 mg tablet Uses PRN- OK to take day of surgery    oxyCODONE (ROXICODONE) 10 MG TABS Uses PRN- OK to take day of surgery    Medication instructions for day surgery reviewed. Please use only a sip of water to take your instructed medications. Avoid all over the counter vitamins, supplements and NSAIDS for one week prior to surgery per anesthesia guidelines. Tylenol is ok to take as needed.     You will receive a call one business day prior to surgery with an arrival time and hospital directions. If your surgery is scheduled on a Monday, the hospital will be calling you on the Friday prior to your surgery. If you have not heard from anyone by 8pm, please call the hospital supervisor through the hospital  at [phone]. ([place] [phone] or [place] [phone]).    Do not eat or drink anything after midnight the night before your surgery, including candy, mints, lifesavers, or chewing gum. Do not drink alcohol 24hrs before your surgery. Try not to smoke at least 24hrs before your surgery.       Follow the pre surgery showering instructions as listed in the “My Surgical Experience Booklet” or otherwise provided by your surgeon's office. Do not use a blade to shave the surgical area 1 week before surgery. It is okay to use a clean electric clippers up to 24 hours before surgery. Do not apply any lotions, creams, including makeup, cologne, deodorant, or perfumes after showering on the day of your surgery. Do not use dry shampoo, hair spray, hair gel, or any type of hair products.     No contact lenses, eye make-up, or artificial eyelashes. Remove nail polish, including gel polish, and any artificial, gel, or acrylic nails if possible. Remove all jewelry including rings and body piercing jewelry.     Wear causal clothing that is easy to take on and off. Consider your type of  surgery.    Keep any valuables, jewelry, piercings at home. Please bring any specially ordered equipment (sling, braces) if indicated.    Arrange for a responsible person to drive you to and from the hospital on the day of your surgery. Please confirm the visitor policy for the day of your procedure when you receive your phone call with an arrival time.     Call the surgeon's office with any new illnesses, exposures, or additional questions prior to surgery.    Please reference your “My Surgical Experience Booklet” for additional information to prepare for your upcoming surgery.

## 2024-10-18 NOTE — TELEPHONE ENCOUNTER
Paloma, a nurse from [facility] called again to see if the patient needs to take keflex. Please advise.    CB: [phone]

## 2024-10-18 NOTE — TELEPHONE ENCOUNTER
Called and spoke with Marjorie Castro. Patient states he received a call from us days ago about urine testing being negative. He never started taking the antibiotics. I then asked about the pain he was having as he did not sound to be in distress. States he has no pain at the moment but does not think Oxy working. RN asked when is he having the pain if he isnt having it now. States at night it gets severe but did take an oxy and pain went away. I advised if pain is going away after Oxy then medication is working. He asked if he can take a double dose. I advised not to as this isn't recommended. Advised ER for severe pain.

## 2024-10-21 ENCOUNTER — APPOINTMENT (OUTPATIENT)
Dept: RADIOLOGY | Facility: AMBULARY SURGERY CENTER | Age: 44
End: 2024-10-21
Payer: COMMERCIAL

## 2024-10-21 ENCOUNTER — APPOINTMENT (EMERGENCY)
Dept: CT IMAGING | Facility: HOSPITAL | Age: 44
End: 2024-10-21
Payer: COMMERCIAL

## 2024-10-21 ENCOUNTER — HOSPITAL ENCOUNTER (OUTPATIENT)
Facility: AMBULARY SURGERY CENTER | Age: 44
Setting detail: OUTPATIENT SURGERY
Discharge: HOME | End: 2024-10-21
Attending: UROLOGY | Admitting: UROLOGY
Payer: COMMERCIAL

## 2024-10-21 ENCOUNTER — NURSE TRIAGE (OUTPATIENT)
Dept: OTHER | Facility: OTHER | Age: 44
End: 2024-10-21

## 2024-10-21 ENCOUNTER — TELEPHONE (OUTPATIENT)
Dept: UROLOGY | Facility: CLINIC | Age: 44
End: 2024-10-21

## 2024-10-21 ENCOUNTER — HOSPITAL ENCOUNTER (OUTPATIENT)
Facility: HOSPITAL | Age: 44
Setting detail: OBSERVATION
Discharge: HOME | End: 2024-10-24
Attending: EMERGENCY MEDICINE | Admitting: INTERNAL MEDICINE
Payer: COMMERCIAL

## 2024-10-21 ENCOUNTER — ANESTHESIA (OUTPATIENT)
Dept: PERIOP | Facility: AMBULARY SURGERY CENTER | Age: 44
End: 2024-10-21
Payer: COMMERCIAL

## 2024-10-21 VITALS
SYSTOLIC BLOOD PRESSURE: 139 MMHG | BODY MASS INDEX: 33.46 KG/M2 | HEIGHT: 64 IN | TEMPERATURE: 96.6 F | HEART RATE: 80 BPM | RESPIRATION RATE: 16 BRPM | WEIGHT: 196 LBS | DIASTOLIC BLOOD PRESSURE: 95 MMHG | OXYGEN SATURATION: 98 %

## 2024-10-21 DIAGNOSIS — R10.13 DYSPEPSIA: ICD-10-CM

## 2024-10-21 DIAGNOSIS — N20.0 KIDNEY STONE: Primary | ICD-10-CM

## 2024-10-21 DIAGNOSIS — N23 RENAL COLIC ON RIGHT SIDE: ICD-10-CM

## 2024-10-21 DIAGNOSIS — N20.0 KIDNEY STONE: ICD-10-CM

## 2024-10-21 DIAGNOSIS — N23 RENAL COLIC: Primary | ICD-10-CM

## 2024-10-21 DIAGNOSIS — R52 MILD PAIN: ICD-10-CM

## 2024-10-21 DIAGNOSIS — N20.9 UROLITHIASIS: ICD-10-CM

## 2024-10-21 DIAGNOSIS — R33.8 ACUTE URINARY RETENTION: ICD-10-CM

## 2024-10-21 LAB
ALBUMIN SERPL BCG-MCNC: 4.9 G/DL (ref 3.5–5)
ALP SERPL-CCNC: 55 U/L (ref 34–104)
ALT SERPL W P-5'-P-CCNC: 54 U/L (ref 7–52)
ANION GAP SERPL CALCULATED.3IONS-SCNC: 11 MMOL/L (ref 4–13)
AST SERPL W P-5'-P-CCNC: 39 U/L (ref 13–39)
BILIRUB SERPL-MCNC: 0.49 MG/DL (ref 0.2–1)
BUN SERPL-MCNC: 10 MG/DL (ref 5–25)
CALCIUM SERPL-MCNC: 9.4 MG/DL (ref 8.4–10.2)
CHLORIDE SERPL-SCNC: 99 MMOL/L (ref 96–108)
CO2 SERPL-SCNC: 25 MMOL/L (ref 21–32)
CREAT SERPL-MCNC: 1.08 MG/DL (ref 0.6–1.3)
GFR SERPL CREATININE-BSD FRML MDRD: 84 ML/MIN/1.73SQ M
GLUCOSE SERPL-MCNC: 157 MG/DL (ref 65–140)
LIPASE SERPL-CCNC: 20 U/L (ref 11–82)
POTASSIUM SERPL-SCNC: 3.8 MMOL/L (ref 3.5–5.3)
PROT SERPL-MCNC: 7.8 G/DL (ref 6.4–8.4)
SODIUM SERPL-SCNC: 135 MMOL/L (ref 135–147)

## 2024-10-21 PROCEDURE — C1894 INTRO/SHEATH, NON-LASER: HCPCS | Performed by: UROLOGY

## 2024-10-21 PROCEDURE — 74177 CT ABD & PELVIS W/CONTRAST: CPT

## 2024-10-21 PROCEDURE — C1758 CATHETER, URETERAL: HCPCS | Performed by: UROLOGY

## 2024-10-21 PROCEDURE — 74420 UROGRAPHY RTRGR +-KUB: CPT

## 2024-10-21 PROCEDURE — 99284 EMERGENCY DEPT VISIT MOD MDM: CPT

## 2024-10-21 PROCEDURE — 80053 COMPREHEN METABOLIC PANEL: CPT | Performed by: EMERGENCY MEDICINE

## 2024-10-21 PROCEDURE — NC001 PR NO CHARGE: Performed by: UROLOGY

## 2024-10-21 PROCEDURE — 85007 BL SMEAR W/DIFF WBC COUNT: CPT | Performed by: EMERGENCY MEDICINE

## 2024-10-21 PROCEDURE — 83690 ASSAY OF LIPASE: CPT | Performed by: EMERGENCY MEDICINE

## 2024-10-21 PROCEDURE — C1747 URETEROSCOPE DIGITAL FLEX SNGL USE STD DEFLECTION APTRA: HCPCS | Performed by: UROLOGY

## 2024-10-21 PROCEDURE — C2625 STENT, NON-COR, TEM W/DEL SY: HCPCS | Performed by: UROLOGY

## 2024-10-21 PROCEDURE — 36415 COLL VENOUS BLD VENIPUNCTURE: CPT | Performed by: EMERGENCY MEDICINE

## 2024-10-21 PROCEDURE — C1769 GUIDE WIRE: HCPCS | Performed by: UROLOGY

## 2024-10-21 PROCEDURE — 99285 EMERGENCY DEPT VISIT HI MDM: CPT | Performed by: EMERGENCY MEDICINE

## 2024-10-21 PROCEDURE — 52356 CYSTO/URETERO W/LITHOTRIPSY: CPT | Performed by: UROLOGY

## 2024-10-21 PROCEDURE — 84145 PROCALCITONIN (PCT): CPT | Performed by: NURSE PRACTITIONER

## 2024-10-21 PROCEDURE — 96361 HYDRATE IV INFUSION ADD-ON: CPT

## 2024-10-21 PROCEDURE — 96375 TX/PRO/DX INJ NEW DRUG ADDON: CPT

## 2024-10-21 PROCEDURE — 85027 COMPLETE CBC AUTOMATED: CPT | Performed by: EMERGENCY MEDICINE

## 2024-10-21 DEVICE — INLAY OPTIMA URETERAL STENT W/O GUIDEWIRE
Type: IMPLANTABLE DEVICE | Site: URETER | Status: FUNCTIONAL
Brand: BARD® INLAY OPTIMA® URETERAL STENT

## 2024-10-21 RX ORDER — HYDRALAZINE HYDROCHLORIDE 20 MG/ML
5 INJECTION INTRAMUSCULAR; INTRAVENOUS ONCE AS NEEDED
Status: CANCELLED | OUTPATIENT
Start: 2024-10-21

## 2024-10-21 RX ORDER — FENTANYL CITRATE 50 UG/ML
INJECTION, SOLUTION INTRAMUSCULAR; INTRAVENOUS AS NEEDED
Status: DISCONTINUED | OUTPATIENT
Start: 2024-10-21 | End: 2024-10-21

## 2024-10-21 RX ORDER — HYDROMORPHONE HCL/PF 1 MG/ML
0.5 SYRINGE (ML) INJECTION ONCE
Status: COMPLETED | OUTPATIENT
Start: 2024-10-21 | End: 2024-10-21

## 2024-10-21 RX ORDER — SODIUM CHLORIDE, SODIUM LACTATE, POTASSIUM CHLORIDE, CALCIUM CHLORIDE 600; 310; 30; 20 MG/100ML; MG/100ML; MG/100ML; MG/100ML
50 INJECTION, SOLUTION INTRAVENOUS CONTINUOUS
Status: CANCELLED | OUTPATIENT
Start: 2024-10-21

## 2024-10-21 RX ORDER — OXYBUTYNIN CHLORIDE 5 MG/1
5 TABLET, EXTENDED RELEASE ORAL DAILY PRN
Qty: 30 TABLET | Refills: 2 | Status: ON HOLD | OUTPATIENT
Start: 2024-10-21 | End: 2024-10-24

## 2024-10-21 RX ORDER — ACETAMINOPHEN 325 MG/1
650 TABLET ORAL EVERY 6 HOURS PRN
Status: DISCONTINUED | OUTPATIENT
Start: 2024-10-21 | End: 2024-10-21 | Stop reason: HOSPADM

## 2024-10-21 RX ORDER — GLYCOPYRROLATE 0.2 MG/ML
INJECTION INTRAMUSCULAR; INTRAVENOUS AS NEEDED
Status: DISCONTINUED | OUTPATIENT
Start: 2024-10-21 | End: 2024-10-21

## 2024-10-21 RX ORDER — MIDAZOLAM HYDROCHLORIDE 2 MG/2ML
INJECTION, SOLUTION INTRAMUSCULAR; INTRAVENOUS AS NEEDED
Status: DISCONTINUED | OUTPATIENT
Start: 2024-10-21 | End: 2024-10-21

## 2024-10-21 RX ORDER — MAGNESIUM HYDROXIDE 1200 MG/15ML
LIQUID ORAL AS NEEDED
Status: DISCONTINUED | OUTPATIENT
Start: 2024-10-21 | End: 2024-10-21 | Stop reason: HOSPADM

## 2024-10-21 RX ORDER — LABETALOL HYDROCHLORIDE 5 MG/ML
10 INJECTION, SOLUTION INTRAVENOUS
Status: CANCELLED | OUTPATIENT
Start: 2024-10-21

## 2024-10-21 RX ORDER — ONDANSETRON 2 MG/ML
INJECTION INTRAMUSCULAR; INTRAVENOUS AS NEEDED
Status: DISCONTINUED | OUTPATIENT
Start: 2024-10-21 | End: 2024-10-21

## 2024-10-21 RX ORDER — ALBUTEROL SULFATE 0.83 MG/ML
2.5 SOLUTION RESPIRATORY (INHALATION) ONCE AS NEEDED
Status: CANCELLED | OUTPATIENT
Start: 2024-10-21

## 2024-10-21 RX ORDER — METOCLOPRAMIDE HYDROCHLORIDE 5 MG/ML
10 INJECTION INTRAMUSCULAR; INTRAVENOUS ONCE AS NEEDED
Status: CANCELLED | OUTPATIENT
Start: 2024-10-21

## 2024-10-21 RX ORDER — FENTANYL CITRATE/PF 50 MCG/ML
50 SYRINGE (ML) INJECTION
Status: CANCELLED | OUTPATIENT
Start: 2024-10-21

## 2024-10-21 RX ORDER — PROPOFOL 10 MG/ML
INJECTION, EMULSION INTRAVENOUS AS NEEDED
Status: DISCONTINUED | OUTPATIENT
Start: 2024-10-21 | End: 2024-10-21

## 2024-10-21 RX ORDER — LIDOCAINE HYDROCHLORIDE 20 MG/ML
1 JELLY TOPICAL ONCE
Status: COMPLETED | OUTPATIENT
Start: 2024-10-22 | End: 2024-10-22

## 2024-10-21 RX ORDER — FENTANYL CITRATE/PF 50 MCG/ML
50 SYRINGE (ML) INJECTION
Status: DISCONTINUED | OUTPATIENT
Start: 2024-10-21 | End: 2024-10-21 | Stop reason: HOSPADM

## 2024-10-21 RX ORDER — ONDANSETRON 2 MG/ML
4 INJECTION INTRAMUSCULAR; INTRAVENOUS ONCE AS NEEDED
Status: CANCELLED | OUTPATIENT
Start: 2024-10-21

## 2024-10-21 RX ORDER — ONDANSETRON 2 MG/ML
4 INJECTION INTRAMUSCULAR; INTRAVENOUS ONCE
Status: COMPLETED | OUTPATIENT
Start: 2024-10-21 | End: 2024-10-21

## 2024-10-21 RX ORDER — CEFAZOLIN SODIUM 2 G/50ML
2000 SOLUTION INTRAVENOUS ONCE
Status: COMPLETED | OUTPATIENT
Start: 2024-10-21 | End: 2024-10-21

## 2024-10-21 RX ORDER — ONDANSETRON 2 MG/ML
4 INJECTION INTRAMUSCULAR; INTRAVENOUS ONCE AS NEEDED
Status: DISCONTINUED | OUTPATIENT
Start: 2024-10-21 | End: 2024-10-21 | Stop reason: HOSPADM

## 2024-10-21 RX ORDER — KETOROLAC TROMETHAMINE 30 MG/ML
15 INJECTION, SOLUTION INTRAMUSCULAR; INTRAVENOUS ONCE
Status: COMPLETED | OUTPATIENT
Start: 2024-10-21 | End: 2024-10-21

## 2024-10-21 RX ORDER — TAMSULOSIN HYDROCHLORIDE 0.4 MG/1
0.4 CAPSULE ORAL
Qty: 30 CAPSULE | Refills: 2 | Status: ON HOLD | OUTPATIENT
Start: 2024-10-21 | End: 2024-10-24

## 2024-10-21 RX ORDER — SULFAMETHOXAZOLE AND TRIMETHOPRIM 800; 160 MG/1; MG/1
1 TABLET ORAL EVERY 12 HOURS SCHEDULED
Qty: 6 TABLET | Refills: 0 | Status: SHIPPED | OUTPATIENT
Start: 2024-10-21 | End: 2024-10-24

## 2024-10-21 RX ORDER — SODIUM CHLORIDE, SODIUM LACTATE, POTASSIUM CHLORIDE, CALCIUM CHLORIDE 600; 310; 30; 20 MG/100ML; MG/100ML; MG/100ML; MG/100ML
INJECTION, SOLUTION INTRAVENOUS CONTINUOUS PRN
Status: DISCONTINUED | OUTPATIENT
Start: 2024-10-21 | End: 2024-10-21

## 2024-10-21 RX ORDER — LABETALOL HYDROCHLORIDE 5 MG/ML
10 INJECTION, SOLUTION INTRAVENOUS
Status: DISCONTINUED | OUTPATIENT
Start: 2024-10-21 | End: 2024-10-21 | Stop reason: HOSPADM

## 2024-10-21 RX ORDER — LIDOCAINE HYDROCHLORIDE 10 MG/ML
INJECTION, SOLUTION EPIDURAL; INFILTRATION; INTRACAUDAL; PERINEURAL AS NEEDED
Status: DISCONTINUED | OUTPATIENT
Start: 2024-10-21 | End: 2024-10-21

## 2024-10-21 RX ORDER — DEXAMETHASONE SODIUM PHOSPHATE 10 MG/ML
INJECTION, SOLUTION INTRAMUSCULAR; INTRAVENOUS AS NEEDED
Status: DISCONTINUED | OUTPATIENT
Start: 2024-10-21 | End: 2024-10-21

## 2024-10-21 RX ORDER — ALBUTEROL SULFATE 0.83 MG/ML
2.5 SOLUTION RESPIRATORY (INHALATION) ONCE AS NEEDED
Status: DISCONTINUED | OUTPATIENT
Start: 2024-10-21 | End: 2024-10-21 | Stop reason: HOSPADM

## 2024-10-21 RX ORDER — HYDROMORPHONE HCL/PF 1 MG/ML
0.5 SYRINGE (ML) INJECTION
Status: CANCELLED | OUTPATIENT
Start: 2024-10-21

## 2024-10-21 RX ORDER — OXYBUTYNIN CHLORIDE 5 MG/1
5 TABLET, EXTENDED RELEASE ORAL DAILY PRN
Status: DISCONTINUED | OUTPATIENT
Start: 2024-10-21 | End: 2024-10-21 | Stop reason: HOSPADM

## 2024-10-21 RX ORDER — SODIUM CHLORIDE, SODIUM LACTATE, POTASSIUM CHLORIDE, CALCIUM CHLORIDE 600; 310; 30; 20 MG/100ML; MG/100ML; MG/100ML; MG/100ML
50 INJECTION, SOLUTION INTRAVENOUS CONTINUOUS
Status: DISCONTINUED | OUTPATIENT
Start: 2024-10-21 | End: 2024-10-21 | Stop reason: HOSPADM

## 2024-10-21 RX ADMIN — DEXAMETHASONE SODIUM PHOSPHATE 10 MG: 10 INJECTION, SOLUTION INTRAMUSCULAR; INTRAVENOUS at 12:33

## 2024-10-21 RX ADMIN — CEFAZOLIN SODIUM 2000 MG: 2 SOLUTION INTRAVENOUS at 12:24

## 2024-10-21 RX ADMIN — SODIUM CHLORIDE, SODIUM LACTATE, POTASSIUM CHLORIDE, AND CALCIUM CHLORIDE: .6; .31; .03; .02 INJECTION, SOLUTION INTRAVENOUS at 11:31

## 2024-10-21 RX ADMIN — LIDOCAINE HYDROCHLORIDE 50 MG: 10 INJECTION, SOLUTION EPIDURAL; INFILTRATION; INTRACAUDAL at 12:29

## 2024-10-21 RX ADMIN — KETOROLAC TROMETHAMINE 15 MG: 30 INJECTION, SOLUTION INTRAMUSCULAR; INTRAVENOUS at 23:10

## 2024-10-21 RX ADMIN — ONDANSETRON 4 MG: 2 INJECTION INTRAMUSCULAR; INTRAVENOUS at 12:37

## 2024-10-21 RX ADMIN — SODIUM CHLORIDE 1000 ML: 0.9 INJECTION, SOLUTION INTRAVENOUS at 23:09

## 2024-10-21 RX ADMIN — DEXMEDETOMIDINE 8 MCG: 100 INJECTION, SOLUTION INTRAVENOUS at 12:33

## 2024-10-21 RX ADMIN — FENTANYL CITRATE 50 MCG: 50 INJECTION INTRAMUSCULAR; INTRAVENOUS at 14:02

## 2024-10-21 RX ADMIN — SODIUM CHLORIDE, SODIUM LACTATE, POTASSIUM CHLORIDE, AND CALCIUM CHLORIDE: .6; .31; .03; .02 INJECTION, SOLUTION INTRAVENOUS at 13:34

## 2024-10-21 RX ADMIN — PROPOFOL 280 MG: 10 INJECTION, EMULSION INTRAVENOUS at 12:29

## 2024-10-21 RX ADMIN — FENTANYL CITRATE 50 MCG: 50 INJECTION INTRAMUSCULAR; INTRAVENOUS at 12:33

## 2024-10-21 RX ADMIN — MIDAZOLAM 2 MG: 1 INJECTION INTRAMUSCULAR; INTRAVENOUS at 12:23

## 2024-10-21 RX ADMIN — OXYBUTYNIN CHLORIDE 5 MG: 5 TABLET, EXTENDED RELEASE ORAL at 15:18

## 2024-10-21 RX ADMIN — GLYCOPYRROLATE 0.2 MG: 0.2 INJECTION INTRAMUSCULAR; INTRAVENOUS at 12:26

## 2024-10-21 RX ADMIN — ONDANSETRON 4 MG: 2 INJECTION INTRAMUSCULAR; INTRAVENOUS at 23:11

## 2024-10-21 RX ADMIN — HYDROMORPHONE HYDROCHLORIDE 0.5 MG: 1 INJECTION, SOLUTION INTRAMUSCULAR; INTRAVENOUS; SUBCUTANEOUS at 23:09

## 2024-10-21 RX ADMIN — FENTANYL CITRATE 50 MCG: 50 INJECTION INTRAMUSCULAR; INTRAVENOUS at 12:28

## 2024-10-21 NOTE — DISCHARGE INSTR - AVS FIRST PAGE
Mr. Castro,          You had a large stone that was blocking the entrance of your kidney like we discussed.  You also had a couple smaller stones in the lower pole of your kidney.  I was able to laser what appeared to be the vast majority of your stones.    Your kidney was very inflamed.  I do think the majority of the stone is small enough to pass along the stent over the next few days.    In order to decide whether or not we need to go back to the operating room to clear out the rest the stone, I recommend getting a CT in about a week.  My office will help you schedule this.  The CT will help us see how much stone burden is left.  If it looks like there is no significant stone left on the CT, we can remove your stent in the office.  If it looks like there is still some stone on the CT, we will schedule your next stone surgery.    Please see the postoperative instructions for details.    Please call the office with any questions or concerns.        Sincerely,  Ritchie Gates        You had procedure on your ureter and kidney.      You had ureteral stent placed.  This is a tube that is placed in your ureter to keep urine draining from your kidney to your bladder.  It may cause discomfort in the form of back spasms or lower abdominal spasms.  This is normal and can be treated with medications if need be.      You may see some blood in your urine.  This is normal.  Please drink plenty of fluids.      Please call the office if you notice that you are passing large blood clots or if you are unable to urinate.      You have been given a prescription for Flomax.  Please take this daily while your ureteral stent is in place. This medication can occasionally cause lightheadedness. Please stop medication if you experience this or any other concerning side effects.      You have been given a prescription for oxybutynin.  Please take this daily as needed for stent discomfort. Please note that this medication may have side  effects including but not limited to: dry eyes, dry mouth, constipation, urinary retention. Please drink plenty of water with this medication.      You have been given a prescription for an antibiotic, Bactrim, for the next 3 days.  Please take this as instructed.      You may take Tylenol as needed for pain.      Pain control plan: Having a ureteral stent is often very uncomfortable. In order to stay on top of your pain, please take over the counter Tylenol around the clock. Additionally take Flomax daily with your stent in place whether or not you are having discomfort. Additionally if you are having discomfort, you need to take your oxybutynin daily, as this helps a lot with bladder spasms and stent discomfort.   Most importantly, please drink lots of fluids, as this is the best way to avoid pain with your stent!    You may shower and bathe as usual when you get home.    You do not have any incisions and can resume typical physical activities, as long as you are not having too much discomfort with stent in place.    Please call the office or present to the ED if you experience concerning signs or symptoms including but not limited to: fevers, chills, nausea, vomiting, worsening flank pain, worsening abdominal pain, passage of large blood clots in the urine, inability to urinate.    As we mentioned above, we will give you a call to set up a CT scan for you to be done in about 1 week.  Based on the findings of the CT scan, I will call you with the plan moving forward regarding your stone treatment.

## 2024-10-21 NOTE — TELEPHONE ENCOUNTER
Patient status post right ureteroscopy at [facility] on 10/21/2024    Patient has 6 x 26 double-J ureteral stent in place.  No string.    I ordered him CT abdomen pelvis without contrast.  Consent 1 please call him and help him get this scheduled 1 week from now?    Based on the results of the CT, I will decide whether or not we need to do repeat ureteroscopy versus office stent removal.

## 2024-10-21 NOTE — ANESTHESIA POSTPROCEDURE EVALUATION
Post-Op Assessment Note    CV Status:  Stable  Pain Score: 0    Pain management: adequate       Mental Status:  Arousable   Hydration Status:  Stable   PONV Controlled:  None   Airway Patency:  Patent     Post Op Vitals Reviewed: Yes    No anethesia notable event occurred.    Staff: CRNA           Last Filed PACU Vitals:  Vitals Value Taken Time   Temp 98.1 °F (36.7 °C) 10/21/24 1341   Pulse 82 10/21/24 1346   /74 10/21/24 1345   Resp 0 10/21/24 1346   SpO2 100 % 10/21/24 1346   Vitals shown include unfiled device data.    Modified Kisha:  Activity: 0 (10/21/2024  1:41 PM)  Respiration: 1 (10/21/2024  1:41 PM)  Circulation: 2 (10/21/2024  1:41 PM)  Consciousness: 0 (10/21/2024  1:41 PM)  Oxygen Saturation: 1 (10/21/2024  1:41 PM)  Modified Kisha Score: 4 (10/21/2024  1:41 PM)

## 2024-10-21 NOTE — H&P
UROLOGY H&P NOTE     Patient Identifiers: Marjorie Castro (MRN [id number])    Date of Service: 10/21/2024    History of Present Illness:     Marjorie Castro is a 44 y.o. old with a history of urolithiasis    Past Medical, Past Surgical History:     Past Medical History:   Diagnosis Date    Generalized seizure disorder (HCC)     Hip strain, left, subsequent encounter     Kidney stone     Seizures (HCC)     Shoulder strain, left, subsequent encounter    :    Past Surgical History:   Procedure Laterality Date    LITHOTRIPSY     :    Medications, Allergies:   No current facility-administered medications for this encounter.    Allergies:  No Known Allergies:    Social and Family History:   Social History:   Social History     Tobacco Use    Smoking status: Never    Smokeless tobacco: Never    Tobacco comments:     Occasional cigar and cigg when drinks   Vaping Use    Vaping status: Never Used   Substance Use Topics    Alcohol use: Yes     Comment: 2-3 drinks on weekend    Drug use: No   .    Social History     Tobacco Use   Smoking Status Never   Smokeless Tobacco Never   Tobacco Comments    Occasional cigar and cigg when drinks       Family History:  Family History   Problem Relation Age of Onset    No Known Problems Mother     Hypertension Father     Diabetes Father     No Known Problems Brother    :     Review of Systems:     General: Fever, chills, or night sweats: negative  Cardiac: Negative for chest pain.    Pulmonary: Negative for shortness of breath.  Gastrointestinal: Abdominal pain negative.  Nausea, vomiting, or diarrhea negative,  Genitourinary: See HPI above.  Patient does not have hematuria.  All other systems queried were negative.    Physical Exam:   General: Patient is pleasant and in NAD. Awake and alert  There were no vitals taken for this visit.No data recorded.  current;    No intake/output data recorded.  Cardiac: Peripheral edema: negative  Pulmonary: Non-labored breathing  Abdomen: Soft, non-tender,  "non-distended.  No surgical scars.  No masses, tenderness, hernias noted.    Genitourinary: Positive CVA tenderness, negative suprapubic tenderness.        Labs:     Lab Results   Component Value Date    HGB 11.3 (L) 10/11/2024    HCT 36.3 (L) 10/11/2024    WBC 5.26 10/11/2024     10/11/2024   ]    Lab Results   Component Value Date    K 4.0 10/11/2024     10/11/2024    CO2 26 10/11/2024    BUN 9 10/11/2024    CREATININE 0.65 10/11/2024    CALCIUM 9.9 10/11/2024   ]    Imaging:   I personally reviewed the images and report of the following studies, and reviewed them with the patient:    Reviewed, see below      ASSESSMENT:     44 y.o. old male with urolithiasis    History of ureteroscopy around 2010    Admitted in September 2024 with hematochezia and syncope.  Imaging at that time incidentally found right staghorn calculus.    CT high-volume bleeding scan abdomen pelvis 9/20/2024: Moderate right renal pelvocaliectasis; 2.3 cm right renal pelvis stone, multiple smaller lower pole stones in the right kidney as well    He was seen in urology office on 10/11/2024 with right flank pain.  Plan was made at that time for him to proceed with right sided ureteral stent placement versus right ureteroscopy.        We discussed ureteroscopy procedure.    I explained that ureteroscopy consisted of patient going to sleep and having scope initially placed into the urethra and bladder.  From there, x-rays would be shot in the ureter and kidney in order to assess for safety wire placement and stone location.  At that point, if possible, a smaller scope would be placed into the ureter and/or kidney to look for stones.  Once the stones were located, they would either be removed with a basket, or lasered into smaller pieces.  Once they were broken up into smaller pieces, they would either be \"dusted\" into very small fragments that would pass on their own or \"fragmented\" into slightly larger pieces that would be able to be " removed in their entirety with the basket.    I explained that after the procedure, most patients needed placement of a ureteral stent.  I explained that ureteral stent is essentially a straw with 2 curly ends. One curl is in the kidney and the other curl is in the bladder.  The stent would allow urine to safely pass from the kidney to the bladder without obstruction.  I explained that in the vast majority of circumstances, the placement of the ureteral stent was not permanent.  I explained that sometimes we are able to leave a string coming out of the end of the stent which comes out of the urethra.  I explained that these types of patients will get instructions in their discharge packet which tells them when to pull the string and remove the stent in its entirety.  I also explained that there are circumstances where we are not able to do this and there is no string coming off the stent.  In these cases, the patient's would need to come to the office for a quick 2-minute procedure called cystoscopy, removal of ureteral stent.    I also explained that there are unfortunately times where we are not able to get up with a smaller scope into the ureter.  In the circumstances, we place ureteral stent and have patient come back to the OR a few weeks later so that the ureter is more dilated and can better tolerate a scope.    I explained that based on data from the urine culture and the appearance of the urine during the case, the patient may or may not be discharged with antibiotics after the procedure.    I did explain that having ureteral stent in place can be quite uncomfortable for some patients.  I explained that I do not routinely give patient's narcotics for this type of procedure.  The patient verbalized understanding.  I did state that I often prescribe medications to help with stent discomfort including but not limited to: Daily tamsulosin 0.4 mg, daily as needed oxybutynin 5 mg XR, as needed diclofenac 50 mg  twice daily.  I also told the patient that I often have patients use over-the-counter Tylenol around-the-clock for the first few days.  I also explained the most important thing for pain often is staying very well-hydrated and that patients should plan to drink plenty of water after the procedure.    I explained that it can be normal to have blood in urine after this procedure.  I explained that as long as the urine was lighter than fruit punch and that there were no blood clots being passed, and that the patient was able to urinate, nothing urgently needed to be done about blood in the urine.  I did explain that it was very important to stay hydrated after the procedure.    We also went over the risk of infection from the procedure.  I explained that if the urine did appear to have a very infected appearance, there are times where we have to simply place a ureteral stent and defer definitive stone management a few weeks later after the patient gets additional antibiotics.  I also explained that there is a low, but nonzero risk of developing a serious infection after stone treatment which would potentially require hospitalization and IV antibiotics.    I also explained that there was a risk of injury to urethra, bladder, ureter, kidneys during the procedure.  I explained that if there appeared to be any serious injury to the urethra bladder, we would likely just leave Barboza catheter for a number of days.  I explained that if there appeared to be extensive injury to the ureter or kidney, we often would elect to leave a ureteral stent in place for a longer period of time.  I explained that in extremely rare circumstances, there would be a severe injury to the ureter or kidney which would actually require placement of percutaneous nephrostomy tube in order for proper drainage of the urine.    I also explained that there are of course risks to getting general anesthesia such as cardiac or pulmonary complications.  I  also explained that there could be risk of developing blood clots.    Additionally, we did review alternative treatments which include extracorporal shockwave lithotripsy, percutaneous nephrolithotomy, observation.  We discussed the risks and benefits of each of these strategies.          PLAN:       -preop ancef  -OR plan as above  -dc home from pacu

## 2024-10-21 NOTE — ANESTHESIA PREPROCEDURE EVALUATION
Procedure:  CYSTOSCOPY URETEROSCOPY WITH LITHOTRIPSY HOLMIUM LASER, RETROGRADE PYELOGRAM AND INSERTION STENT URETERAL (Right: Bladder)    Relevant Problems   /RENAL   (+) Hydronephrosis of right kidney      NEURO/PSYCH   (+) Generalized seizure disorder (HCC)      Shoulder strain, left, subsequent encounter    Hip strain, left, subsequent encounter    Generalized seizure disorder (HCC)    Seizures (HCC)    Kidney ston      Physical Exam    Airway    Mallampati score: II  TM Distance: >3 FB  Neck ROM: full     Dental       Cardiovascular  Cardiovascular exam normal    Pulmonary  Pulmonary exam normal     Other Findings        Anesthesia Plan  ASA Score- 2     Anesthesia Type- general with ASA Monitors.         Additional Monitors:     Airway Plan:            Plan Factors-Exercise tolerance (METS): >4 METS.    Chart reviewed. EKG reviewed. Imaging results reviewed. Existing labs reviewed. Patient summary reviewed.                  Induction- intravenous.    Postoperative Plan- Plan for postoperative opioid use. Planned trial extubation        Informed Consent- Anesthetic plan and risks discussed with patient.  I personally reviewed this patient with the CRNA. Discussed and agreed on the Anesthesia Plan with the CRNA..

## 2024-10-21 NOTE — OP NOTE
OPERATIVE REPORT  PATIENT NAME: Marjorie Castro    :  1980  MRN: [id number]  Pt Location: [facility] OR ROOM 04    SURGERY DATE: 10/21/2024    Surgeons and Role:     * DO Effie Dewitt Primary    Preop Diagnosis:  Nephrolithiasis [N20.0]    Post-Op Diagnosis Codes:     * Nephrolithiasis [N20.0]    Procedure(s):      Cystourethroscopy  Right retrograde pyelogram with live fluoroscopic interpretation  Right ureteroscopy  Laser lithotripsy  Right ureteral stent placement        -single use ureteroscope utilized ()          Specimen(s):  * No specimens in log *    Estimated Blood Loss:   Minimal    Drains:  Ureteral Internal Stent Right ureter 6 Fr. (Active)   Number of days: 0       Anesthesia Type:   General    Operative Indications:  Nephrolithiasis [N20.0]          44 y.o. old male with urolithiasis     History of ureteroscopy around      Admitted in 2024 with hematochezia and syncope.  Imaging at that time incidentally found right staghorn calculus.     CT high-volume bleeding scan abdomen pelvis 2024: Moderate right renal pelvocaliectasis; 2.3 cm right renal pelvis stone, multiple smaller lower pole stones in the right kidney as well     He was seen in urology office on 10/11/2024 with right flank pain.  Plan was made at that time for him to proceed with right sided ureteral stent placement versus right ureteroscopy.          Operative Findings:              No meatal stenosis  No urethral strictures  Prostate mildly enlarged.  Somewhat high riding bladder neck.  Bilateral ureteral orifices in orthotopic positions  No bladder lesions  No stones in bladder  Mild trabeculations.  Dome chimney present.  No diverticuli  Right retrograde pyelogram: Moderate hydronephrosis, large filling defect at renal pelvis toward the UPJ representing the known stone; additional filling defects in the lower pole representing stones  Right pyeloscopy: Large approximately 2 cm stone in the renal  pelvis into the UPJ, fairly impacted.  About 4-6 additional lower pole stones ranging from 3 to 5 mm.  Stones were mostly dusted using 272 µm laser fiber.  Back out ureteroscopy: Multiple smaller stone fragments throughout proximal ureter, area of significant urothelial inflammation at the UPJ and renal pelvis at site of previous stone impaction.  Some mild urothelial splitting at the mid to proximal ureter from the access sheath advancement.  Successfully placed 6 x 26 double-J ureteral stent with no string attached            Complications:   None    Procedure and Technique:                   Patient identified in the preop holding area.  Consent was obtained.  Risks and benefits of the procedure were explained to the patient.  Patient was in agreement.  Patient was brought back to the OR and placed upon the table.  Bilateral lower extremity SCDs were placed and turned on.  Patient received IV Ancef for antibiotics.  Patient underwent smooth induction of anesthesia.  Bilateral lower extremities were placed in stirrups.  Patient was in dorsal lithotomy position.  Area was prepped and draped in sterile fashion.  Timeout was performed by the OR team.     Case began with insertion of 21 [place] rigid cystoscope.  Pan cystourethroscopy was performed.  See the above findings for details.     Attention was turned toward the right ureteral orifice.      5 [place] open-ended catheter was advanced through the bridge of the scope toward the ureteral orifice.  Guidewire was advanced through the 5 [place] open-ended catheter, into the ureter, and coiled in renal pelvis under fluoroscopic guidance.    Dual-lumen catheter was advanced over the wire and into the ureter under fluoroscopic guidance.  Retrograde pyelogram was performed at this time.  See the above findings for details.  Second wire was placed through the dual-lumen catheter and coiled into the renal pelvis under fluoroscopic guidance.  Dual-lumen catheter was removed  over the 2 wires in a push pull fashion.    It was decided that an access sheath would be utilized. 11-13 Fr 45 cm access sheath was advanced over one of the wires under fluoroscopic guidance toward the proximal ureter.  Wire and inner sheath were removed. Flexible single use ureteroscope was assembled.  Scope was advanced into the renal pelvis.  Pyeloscopy was performed.  See the above findings for details regarding stone works.      Backout flexible ureteroscopy was then performed.  See above findings for details.    The ureteroscope was then removed from the bladder and urethra.     Cystoscope was reentered into the bladder over the wire toward the right ureteral orifice.  6 x 26 double-J ureteral stent was advanced over the wire toward the renal pelvis under fluoroscopic guidance.  Wire was removed and stent was deployed.  Good proximal curl was seen on fluoroscopy.  Good distal curl was seen on direct cystoscopy.      There was no string left on the stent.      Bladder was emptied and scope was removed.     Patient was uneventfully awoken from anesthesia and extubated.  Patient was brought to PACU in good condition.               A qualified resident physician was not available.    Patient Disposition:  PACU              SIGNATURE: Ritchie Gates DO  DATE: October 21, 2024  TIME: 1:39 PM        PLAN  -Right sided 6 x 26 double-J ureteral stent in place.  No string.  Due to the large stone burden that was cleared as well as the large amount of dust, we will plan on CT abdomen pelvis without contrast in about 1 week.  Depending on the stone burden seen on the imaging, decision will be made whether patient needs repeat ureteroscopy versus office stent removal.

## 2024-10-22 PROBLEM — R10.13 DYSPEPSIA: Status: ACTIVE | Noted: 2024-10-22

## 2024-10-22 PROBLEM — D72.829 LEUKOCYTOSIS: Status: ACTIVE | Noted: 2024-10-22

## 2024-10-22 PROBLEM — N39.0 UTI (URINARY TRACT INFECTION): Status: ACTIVE | Noted: 2024-10-22

## 2024-10-22 PROBLEM — N23 RENAL COLIC ON RIGHT SIDE: Status: ACTIVE | Noted: 2024-10-22

## 2024-10-22 PROBLEM — D64.9 ANEMIA: Status: ACTIVE | Noted: 2024-10-22

## 2024-10-22 LAB
ANION GAP SERPL CALCULATED.3IONS-SCNC: 7 MMOL/L (ref 4–13)
ANISOCYTOSIS BLD QL SMEAR: PRESENT
ATRIAL RATE: 125 BPM
BACTERIA UR QL AUTO: ABNORMAL /HPF
BASOPHILS # BLD AUTO: 0.02 THOUSANDS/ΜL (ref 0–0.1)
BASOPHILS # BLD MANUAL: 0 THOUSAND/UL (ref 0–0.1)
BASOPHILS NFR BLD AUTO: 0 % (ref 0–1)
BASOPHILS NFR MAR MANUAL: 0 % (ref 0–1)
BILIRUB UR QL STRIP: NEGATIVE
BUN SERPL-MCNC: 10 MG/DL (ref 5–25)
CALCIUM SERPL-MCNC: 8.9 MG/DL (ref 8.4–10.2)
CHLORIDE SERPL-SCNC: 101 MMOL/L (ref 96–108)
CLARITY UR: ABNORMAL
CO2 SERPL-SCNC: 26 MMOL/L (ref 21–32)
COLOR UR: ABNORMAL
CREAT SERPL-MCNC: 1.04 MG/DL (ref 0.6–1.3)
EOSINOPHIL # BLD AUTO: 0 THOUSAND/ΜL (ref 0–0.61)
EOSINOPHIL # BLD MANUAL: 0 THOUSAND/UL (ref 0–0.4)
EOSINOPHIL NFR BLD AUTO: 0 % (ref 0–6)
EOSINOPHIL NFR BLD MANUAL: 0 % (ref 0–6)
ERYTHROCYTE [DISTWIDTH] IN BLOOD BY AUTOMATED COUNT: 12.6 % (ref 11.6–15.1)
ERYTHROCYTE [DISTWIDTH] IN BLOOD BY AUTOMATED COUNT: 12.7 % (ref 11.6–15.1)
GFR SERPL CREATININE-BSD FRML MDRD: 88 ML/MIN/1.73SQ M
GLUCOSE SERPL-MCNC: 111 MG/DL (ref 65–140)
GLUCOSE SERPL-MCNC: 156 MG/DL (ref 65–140)
GLUCOSE UR STRIP-MCNC: NEGATIVE MG/DL
HCT VFR BLD AUTO: 32.4 % (ref 36.5–49.3)
HCT VFR BLD AUTO: 37.1 % (ref 36.5–49.3)
HGB BLD-MCNC: 10.2 G/DL (ref 12–17)
HGB BLD-MCNC: 11.9 G/DL (ref 12–17)
HGB UR QL STRIP.AUTO: ABNORMAL
IMM GRANULOCYTES # BLD AUTO: 0.05 THOUSAND/UL (ref 0–0.2)
IMM GRANULOCYTES NFR BLD AUTO: 0 % (ref 0–2)
KETONES UR STRIP-MCNC: NEGATIVE MG/DL
LEUKOCYTE ESTERASE UR QL STRIP: ABNORMAL
LYMPHOCYTES # BLD AUTO: 0.52 THOUSAND/UL (ref 0.6–4.47)
LYMPHOCYTES # BLD AUTO: 0.94 THOUSANDS/ΜL (ref 0.6–4.47)
LYMPHOCYTES # BLD AUTO: 4 % (ref 14–44)
LYMPHOCYTES NFR BLD AUTO: 7 % (ref 14–44)
MCH RBC QN AUTO: 27.9 PG (ref 26.8–34.3)
MCH RBC QN AUTO: 28.1 PG (ref 26.8–34.3)
MCHC RBC AUTO-ENTMCNC: 31.5 G/DL (ref 31.4–37.4)
MCHC RBC AUTO-ENTMCNC: 32.1 G/DL (ref 31.4–37.4)
MCV RBC AUTO: 88 FL (ref 82–98)
MCV RBC AUTO: 89 FL (ref 82–98)
MONOCYTES # BLD AUTO: 0 THOUSAND/UL (ref 0–1.22)
MONOCYTES # BLD AUTO: 0.6 THOUSAND/ΜL (ref 0.17–1.22)
MONOCYTES NFR BLD AUTO: 5 % (ref 4–12)
MONOCYTES NFR BLD: 0 % (ref 4–12)
NEUTROPHILS # BLD AUTO: 11.75 THOUSANDS/ΜL (ref 1.85–7.62)
NEUTROPHILS # BLD MANUAL: 12.47 THOUSAND/UL (ref 1.85–7.62)
NEUTS BAND NFR BLD MANUAL: 2 % (ref 0–8)
NEUTS SEG NFR BLD AUTO: 88 % (ref 43–75)
NEUTS SEG NFR BLD AUTO: 94 % (ref 43–75)
NITRITE UR QL STRIP: NEGATIVE
NON-SQ EPI CELLS URNS QL MICRO: ABNORMAL /HPF
NRBC BLD AUTO-RTO: 0 /100 WBCS
P AXIS: 77 DEGREES
PH UR STRIP.AUTO: 7 [PH]
PLATELET # BLD AUTO: 257 THOUSANDS/UL (ref 149–390)
PLATELET # BLD AUTO: 275 THOUSANDS/UL (ref 149–390)
PLATELET BLD QL SMEAR: ADEQUATE
PMV BLD AUTO: 10.4 FL (ref 8.9–12.7)
PMV BLD AUTO: 10.9 FL (ref 8.9–12.7)
POIKILOCYTOSIS BLD QL SMEAR: PRESENT
POLYCHROMASIA BLD QL SMEAR: PRESENT
POTASSIUM SERPL-SCNC: 3.9 MMOL/L (ref 3.5–5.3)
PR INTERVAL: 134 MS
PROCALCITONIN SERPL-MCNC: <0.05 NG/ML
PROCALCITONIN SERPL-MCNC: <0.05 NG/ML
PROT UR STRIP-MCNC: ABNORMAL MG/DL
QRS AXIS: 55 DEGREES
QRSD INTERVAL: 90 MS
QT INTERVAL: 282 MS
QTC INTERVAL: 407 MS
RBC # BLD AUTO: 3.65 MILLION/UL (ref 3.88–5.62)
RBC # BLD AUTO: 4.23 MILLION/UL (ref 3.88–5.62)
RBC #/AREA URNS AUTO: ABNORMAL /HPF
RBC MORPH BLD: PRESENT
SODIUM SERPL-SCNC: 134 MMOL/L (ref 135–147)
SP GR UR STRIP.AUTO: 1.01 (ref 1–1.03)
T WAVE AXIS: 84 DEGREES
UROBILINOGEN UR STRIP-ACNC: <2 MG/DL
VENTRICULAR RATE: 125 BPM
WBC # BLD AUTO: 12.99 THOUSAND/UL (ref 4.31–10.16)
WBC # BLD AUTO: 13.36 THOUSAND/UL (ref 4.31–10.16)
WBC #/AREA URNS AUTO: ABNORMAL /HPF

## 2024-10-22 PROCEDURE — 82948 REAGENT STRIP/BLOOD GLUCOSE: CPT

## 2024-10-22 PROCEDURE — 99223 1ST HOSP IP/OBS HIGH 75: CPT | Performed by: INTERNAL MEDICINE

## 2024-10-22 PROCEDURE — 36415 COLL VENOUS BLD VENIPUNCTURE: CPT | Performed by: NURSE PRACTITIONER

## 2024-10-22 PROCEDURE — 99214 OFFICE O/P EST MOD 30 MIN: CPT | Performed by: UROLOGY

## 2024-10-22 PROCEDURE — 93010 ELECTROCARDIOGRAM REPORT: CPT | Performed by: INTERNAL MEDICINE

## 2024-10-22 PROCEDURE — 93005 ELECTROCARDIOGRAM TRACING: CPT

## 2024-10-22 PROCEDURE — 81001 URINALYSIS AUTO W/SCOPE: CPT | Performed by: EMERGENCY MEDICINE

## 2024-10-22 PROCEDURE — 96365 THER/PROPH/DIAG IV INF INIT: CPT

## 2024-10-22 PROCEDURE — 80048 BASIC METABOLIC PNL TOTAL CA: CPT | Performed by: NURSE PRACTITIONER

## 2024-10-22 PROCEDURE — 84145 PROCALCITONIN (PCT): CPT | Performed by: NURSE PRACTITIONER

## 2024-10-22 PROCEDURE — 96376 TX/PRO/DX INJ SAME DRUG ADON: CPT

## 2024-10-22 PROCEDURE — 87086 URINE CULTURE/COLONY COUNT: CPT | Performed by: NURSE PRACTITIONER

## 2024-10-22 PROCEDURE — 96361 HYDRATE IV INFUSION ADD-ON: CPT

## 2024-10-22 PROCEDURE — 85025 COMPLETE CBC W/AUTO DIFF WBC: CPT | Performed by: NURSE PRACTITIONER

## 2024-10-22 RX ORDER — ONDANSETRON 2 MG/ML
4 INJECTION INTRAMUSCULAR; INTRAVENOUS EVERY 6 HOURS PRN
Status: DISCONTINUED | OUTPATIENT
Start: 2024-10-22 | End: 2024-10-24 | Stop reason: HOSPADM

## 2024-10-22 RX ORDER — ACETAMINOPHEN 325 MG/1
650 TABLET ORAL EVERY 6 HOURS PRN
Status: DISCONTINUED | OUTPATIENT
Start: 2024-10-22 | End: 2024-10-24 | Stop reason: HOSPADM

## 2024-10-22 RX ORDER — ENOXAPARIN SODIUM 100 MG/ML
40 INJECTION SUBCUTANEOUS DAILY
Status: DISCONTINUED | OUTPATIENT
Start: 2024-10-22 | End: 2024-10-24 | Stop reason: HOSPADM

## 2024-10-22 RX ORDER — OXYCODONE HYDROCHLORIDE 5 MG/1
5 TABLET ORAL EVERY 4 HOURS PRN
Status: DISCONTINUED | OUTPATIENT
Start: 2024-10-22 | End: 2024-10-23

## 2024-10-22 RX ORDER — PANTOPRAZOLE SODIUM 40 MG/1
40 TABLET, DELAYED RELEASE ORAL
Status: DISCONTINUED | OUTPATIENT
Start: 2024-10-22 | End: 2024-10-24 | Stop reason: HOSPADM

## 2024-10-22 RX ORDER — MAGNESIUM HYDROXIDE/ALUMINUM HYDROXICE/SIMETHICONE 120; 1200; 1200 MG/30ML; MG/30ML; MG/30ML
30 SUSPENSION ORAL EVERY 8 HOURS PRN
Status: DISCONTINUED | OUTPATIENT
Start: 2024-10-22 | End: 2024-10-24 | Stop reason: HOSPADM

## 2024-10-22 RX ORDER — HYDROXYZINE HYDROCHLORIDE 25 MG/1
25 TABLET, FILM COATED ORAL EVERY 6 HOURS PRN
Status: DISCONTINUED | OUTPATIENT
Start: 2024-10-22 | End: 2024-10-24 | Stop reason: HOSPADM

## 2024-10-22 RX ORDER — HYDROMORPHONE HCL/PF 1 MG/ML
0.5 SYRINGE (ML) INJECTION EVERY 6 HOURS PRN
Status: DISCONTINUED | OUTPATIENT
Start: 2024-10-22 | End: 2024-10-22

## 2024-10-22 RX ORDER — OXYBUTYNIN CHLORIDE 5 MG/1
5 TABLET, EXTENDED RELEASE ORAL DAILY PRN
Status: DISCONTINUED | OUTPATIENT
Start: 2024-10-22 | End: 2024-10-22

## 2024-10-22 RX ORDER — HYDROMORPHONE HCL/PF 1 MG/ML
0.5 SYRINGE (ML) INJECTION ONCE
Status: COMPLETED | OUTPATIENT
Start: 2024-10-22 | End: 2024-10-22

## 2024-10-22 RX ORDER — TAMSULOSIN HYDROCHLORIDE 0.4 MG/1
0.4 CAPSULE ORAL
Status: DISCONTINUED | OUTPATIENT
Start: 2024-10-22 | End: 2024-10-24 | Stop reason: HOSPADM

## 2024-10-22 RX ORDER — SODIUM CHLORIDE, SODIUM LACTATE, POTASSIUM CHLORIDE, CALCIUM CHLORIDE 600; 310; 30; 20 MG/100ML; MG/100ML; MG/100ML; MG/100ML
75 INJECTION, SOLUTION INTRAVENOUS CONTINUOUS
Status: DISCONTINUED | OUTPATIENT
Start: 2024-10-22 | End: 2024-10-24 | Stop reason: HOSPADM

## 2024-10-22 RX ORDER — PHENAZOPYRIDINE HYDROCHLORIDE 100 MG/1
100 TABLET, FILM COATED ORAL
Status: DISCONTINUED | OUTPATIENT
Start: 2024-10-22 | End: 2024-10-24 | Stop reason: HOSPADM

## 2024-10-22 RX ORDER — KETOROLAC TROMETHAMINE 30 MG/ML
15 INJECTION, SOLUTION INTRAMUSCULAR; INTRAVENOUS EVERY 6 HOURS PRN
Status: DISPENSED | OUTPATIENT
Start: 2024-10-22 | End: 2024-10-24

## 2024-10-22 RX ORDER — LANOLIN ALCOHOL/MO/W.PET/CERES
3 CREAM (GRAM) TOPICAL
Status: DISCONTINUED | OUTPATIENT
Start: 2024-10-22 | End: 2024-10-24 | Stop reason: HOSPADM

## 2024-10-22 RX ORDER — OXYBUTYNIN CHLORIDE 5 MG/1
5 TABLET ORAL 3 TIMES DAILY
Status: DISCONTINUED | OUTPATIENT
Start: 2024-10-22 | End: 2024-10-24

## 2024-10-22 RX ORDER — HYDROMORPHONE HCL/PF 1 MG/ML
0.5 SYRINGE (ML) INJECTION
Status: DISCONTINUED | OUTPATIENT
Start: 2024-10-22 | End: 2024-10-23

## 2024-10-22 RX ORDER — HYDROMORPHONE HCL IN WATER/PF 6 MG/30 ML
0.2 PATIENT CONTROLLED ANALGESIA SYRINGE INTRAVENOUS EVERY 4 HOURS PRN
Status: DISCONTINUED | OUTPATIENT
Start: 2024-10-22 | End: 2024-10-22

## 2024-10-22 RX ADMIN — ONDANSETRON 4 MG: 2 INJECTION INTRAMUSCULAR; INTRAVENOUS at 08:49

## 2024-10-22 RX ADMIN — Medication 3 MG: at 03:32

## 2024-10-22 RX ADMIN — OXYBUTYNIN CHLORIDE 5 MG: 5 TABLET ORAL at 12:25

## 2024-10-22 RX ADMIN — PANTOPRAZOLE SODIUM 40 MG: 40 TABLET, DELAYED RELEASE ORAL at 12:25

## 2024-10-22 RX ADMIN — IOHEXOL 100 ML: 350 INJECTION, SOLUTION INTRAVENOUS at 00:21

## 2024-10-22 RX ADMIN — OXYBUTYNIN CHLORIDE 5 MG: 5 TABLET ORAL at 16:19

## 2024-10-22 RX ADMIN — ACETAMINOPHEN 650 MG: 325 TABLET ORAL at 03:32

## 2024-10-22 RX ADMIN — CEFTRIAXONE 1000 MG: 2 INJECTION, POWDER, FOR SOLUTION INTRAMUSCULAR; INTRAVENOUS at 01:52

## 2024-10-22 RX ADMIN — TAMSULOSIN HYDROCHLORIDE 0.4 MG: 0.4 CAPSULE ORAL at 16:20

## 2024-10-22 RX ADMIN — OXYBUTYNIN CHLORIDE 5 MG: 5 TABLET, EXTENDED RELEASE ORAL at 08:38

## 2024-10-22 RX ADMIN — PHENAZOPYRIDINE 100 MG: 100 TABLET ORAL at 16:20

## 2024-10-22 RX ADMIN — Medication 3 MG: at 22:29

## 2024-10-22 RX ADMIN — ENOXAPARIN SODIUM 40 MG: 40 INJECTION SUBCUTANEOUS at 08:26

## 2024-10-22 RX ADMIN — CEFEPIME 2000 MG: 2 INJECTION, POWDER, FOR SOLUTION INTRAVENOUS at 20:10

## 2024-10-22 RX ADMIN — PHENAZOPYRIDINE 100 MG: 100 TABLET ORAL at 12:25

## 2024-10-22 RX ADMIN — HYDROMORPHONE HYDROCHLORIDE 0.5 MG: 1 INJECTION, SOLUTION INTRAMUSCULAR; INTRAVENOUS; SUBCUTANEOUS at 00:30

## 2024-10-22 RX ADMIN — SODIUM CHLORIDE, SODIUM LACTATE, POTASSIUM CHLORIDE, AND CALCIUM CHLORIDE 125 ML/HR: .6; .31; .03; .02 INJECTION, SOLUTION INTRAVENOUS at 03:39

## 2024-10-22 RX ADMIN — OXYCODONE HYDROCHLORIDE 5 MG: 5 TABLET ORAL at 23:57

## 2024-10-22 RX ADMIN — CEFEPIME 2000 MG: 2 INJECTION, POWDER, FOR SOLUTION INTRAVENOUS at 08:26

## 2024-10-22 RX ADMIN — OXYBUTYNIN CHLORIDE 5 MG: 5 TABLET ORAL at 22:00

## 2024-10-22 RX ADMIN — LIDOCAINE HYDROCHLORIDE 1 APPLICATION: 20 JELLY TOPICAL at 00:40

## 2024-10-22 RX ADMIN — KETOROLAC TROMETHAMINE 15 MG: 30 INJECTION, SOLUTION INTRAMUSCULAR; INTRAVENOUS at 05:10

## 2024-10-22 NOTE — ED ATTENDING ATTESTATION
10/21/2024  I, Norm Rees MD, saw and evaluated the patient. I have discussed the patient with the resident/non-physician practitioner and agree with the resident's/non-physician practitioner's findings, Plan of Care, and MDM as documented in the resident's/non-physician practitioner's note, except where noted. All available labs and Radiology studies were reviewed.  I was present for key portions of any procedure(s) performed by the resident/non-physician practitioner and I was immediately available to provide assistance.       At this point I agree with the current assessment done in the Emergency Department.  I have conducted an independent evaluation of this patient a history and physical is as follows: Patient is a 44 year old male with worsening abdominal pain and right flank pain today and was last seen at [facility] for kidney stone surgery earlier today with ureteral stenting and lithotripsy. (+) difficulty urinating. (+) nausea. No fever. No vomiting. (+) hematuria. [employer] website checked on this patient and last Rx filled was on 10/11/24 for oxycodone for 2 day supply. NCAT. No scleral icterus. Mucous membranes somewhat moist. Lungs clear. Heart regular without murmur. Distended abdomen with diffuse tenderness. (+) bowel sounds. (+) R CVAT. No guarding. No rash noted. No edema. No rash noted. No jaundice. DDx including but not limited to: appendicitis, gastroenteritis, gastritis, PUD, GERD, gastroparesis, hepatitis, pancreatitis, colitis, enteritis, diverticulitis, food poisoning, epiploic appendagitis, mesenteric adenitis, mesenteric panniculitis, mesenteric ischemia, IBD, IBS, ileus, bowel obstruction, volvulus, internal hernia, cholecystitis, biliary colic, choledocholithiasis, perforated viscus, tumor, splenic etiology, constipation, AAA; doubt testicular torsion; renal colic, pyelonephritis, UTI; doubt cardiac etiology. DDX including but not limited to: urinary  retention, reaction to anesthesia, BPH, hematuria, UTI, tumor, neurologic etiology. Bladder scan with 644 mL. Will check labs and EKG and CT and IVFs and IV zofran and IV dilaudid and toradol given. Will place 3 way paul for urinary retention and possible CBI.     ED Course         Critical Care Time  Procedures

## 2024-10-22 NOTE — ED NOTES
Pt exits ED accompanied by ED tech to assigned IP unit via stretcher, no acute distress noted.     Pauline Rothman RN  10/22/24 1898

## 2024-10-22 NOTE — TELEPHONE ENCOUNTER
Contacted on-call provider who recommends patient try taking a Roxicodone and if no relief, patient should be evaluated in the ER.    Contacted patient's wife to make her aware of on-call provider's recommendation.  She verbalized understanding and was appreciative.

## 2024-10-22 NOTE — ANESTHESIA POSTPROCEDURE EVALUATION
Post-Op Assessment Note    Last Filed PACU Vitals:  Vitals Value Taken Time   Temp 97.5 °F (36.4 °C) 10/21/24 1415   Pulse 89 10/21/24 1416   /88 10/21/24 1415   Resp 11 10/21/24 1416   SpO2 98 % 10/21/24 1416   Vitals shown include unfiled device data.    Modified Kisha:  Activity: 2 (10/21/2024  2:20 PM)  Respiration: 2 (10/21/2024  2:20 PM)  Circulation: 2 (10/21/2024  2:20 PM)  Consciousness: 2 (10/21/2024  2:20 PM)  Oxygen Saturation: 2 (10/21/2024  2:20 PM)  Modified Kisha Score: 10 (10/21/2024  2:20 PM)

## 2024-10-22 NOTE — ASSESSMENT & PLAN NOTE
Patient complains of urinary urgency without dysuria or gross hematuria.   Urine micro: Innumerable RBCs, WBC 2-4, occasional bacteria.  Urine culture pending, prior urine culture without any growth.  Received IV Rocephin in ED, continue with IV Cefepime due to recent instrumentation.

## 2024-10-22 NOTE — SEPSIS NOTE
Sepsis Note   Marjorie Castro 44 y.o. male MRN: [id number]  Unit/Bed#: ED-36 Encounter: [id number]       Initial Sepsis Screening       Row Name 10/22/24 0149                Is the patient's history suggestive of a new or worsening infection? No  -HA                  User Key  (r) = Recorded By, (t) = Taken By, (c) = Cosigned By      Initials Name Provider Type    DANYELLE Fuentes DO Resident                        There is no height or weight on file to calculate BMI.  Wt Readings from Last 1 Encounters:   10/21/24 88.9 kg (196 lb)     IBW (Ideal Body Weight): 59.2 kg    Ideal body weight: 59.2 kg (130 lb 8.2 oz)  Adjusted ideal body weight: 71.1 kg (156 lb 11.3 oz)

## 2024-10-22 NOTE — H&P
"H&P - Hospitalist   Name: Marjorie Castro 44 y.o. male I MRN: [id number]  Unit/Bed#: ED-36 I Date of Admission: 10/21/2024   Date of Service: 10/22/2024 I Hospital Day: 0     Assessment & Plan  Renal colic on right side with hydronephrosis  Presentation: Patient presented with complaints of severe right sided flank pain and urinary urgency. Patient was recently diagnosed with staghorn renal calculi with subsequent stent placement and lithotripsy performed on 10/21/2024. Patient was discharged home after procedure and developed severe, waxing and waning right sided flank pain. Patient reports increased urgency to urinate without dysuria or gross hematuria. Patient was discharged home on PO Bactrim. Patient reports taking 1 oxycodone around 21:30 without relief; prompting him to present to the ED for further evaluation. Patient denies fever, chills, nausea, vomiting, chest pain or shortness of breath.  CT: \" Moderate right hydronephrosis. Ureteral stent in expected position. Significant perinephric and periureteral fluid and calculi medial to the right ureteropelvic junction suggesting calyceal or ureteral perforation. Stone fragments within the renal pelvis and proximal ureter. Nonobstructing calculi measuring up to 6.6 mm.\"  Urine micro: Innumerable RBCs, WBC 2-4, occasional bacteria.  Barboza catheter placed in ED.  Strict I&Os.  IV Hydration.  Pain control.  Received IV Rocephin in ED, continue with IV Cefepime due to recent instrumentation.  Continue Flomax and Oxybutynin.  Consult Urology.  Leukocytosis  Patient did not meet 2 SIRS criteria on admission.  POA, WBCs 12.99  Trend CBC.  UTI (urinary tract infection)  Patient complains of urinary urgency without dysuria or gross hematuria.   Urine micro: Innumerable RBCs, WBC 2-4, occasional bacteria.  Urine culture pending, prior urine culture without any growth.  Received IV Rocephin in ED, continue with IV Cefepime due to recent instrumentation.  Generalized seizure " disorder (HCC)  Continue Brivaracetam.      VTE Pharmacologic Prophylaxis: VTE Score: 3 Moderate Risk (Score 3-4) - Pharmacological DVT Prophylaxis Ordered: enoxaparin (Lovenox).  Code Status: Level 1 - Full Code   Discussion with family: Updated  (wife) at bedside.    Anticipated Length of Stay: Patient will be admitted on an observation basis with an anticipated length of stay of less than 2 midnights secondary to renal colic, IV antibiotics for UTI.    History of Present Illness   Chief Complaint: Right flank pain, urinary urgency    Marjorie Castro is a 44 y.o. male with a PMH of generalized seizure disorder, kidney stones who presents with complaints of severe right sided flank pain and urinary urgency. Patient was recently diagnosed with staghorn renal calculi with subsequent stent placement and lithotripsy performed on 10/21/2024. Patient was discharged home after procedure and developed severe, waxing and waning right sided flank pain.Patient reports increased urgency to urinate without dysuria or gross hematuria. Patient was discharged home on PO Bactrim. Patient reports taking 1 oxycodone around 21:30 without relief; prompting him to present to the ED for further evaluation. Patient denies fever, chills, nausea, vomiting, chest pain or shortness of breath.    Review of Systems   Constitutional:  Negative for chills and fever.   Respiratory:  Negative for shortness of breath.    Cardiovascular:  Negative for chest pain.   Gastrointestinal:  Negative for abdominal pain, nausea and vomiting.   Genitourinary:  Positive for decreased urine volume, difficulty urinating, flank pain and urgency. Negative for dysuria.   Musculoskeletal:  Positive for back pain.   All other systems reviewed and are negative.      Historical Information   Past Medical History:   Diagnosis Date    Generalized seizure disorder (HCC)     Hip strain, left, subsequent encounter     Kidney stone     Seizures (HCC)     Shoulder  strain, left, subsequent encounter      Past Surgical History:   Procedure Laterality Date    LITHOTRIPSY       Social History     Tobacco Use    Smoking status: Never    Smokeless tobacco: Never    Tobacco comments:     Occasional cigar and cigg when drinks   Vaping Use    Vaping status: Never Used   Substance and Sexual Activity    Alcohol use: Yes     Comment: 2-3 drinks on weekend    Drug use: No    Sexual activity: Not on file     E-Cigarette/Vaping    E-Cigarette Use Never User      E-Cigarette/Vaping Substances    Nicotine No     THC No     CBD No     Flavoring No     Other No     Unknown No      Family History   Problem Relation Age of Onset    No Known Problems Mother     Hypertension Father     Diabetes Father     No Known Problems Brother      Social History:  Marital Status: /Civil Union   Occupation: Unknown  Patient Pre-hospital Living Situation: Home  Patient Pre-hospital Level of Mobility: walks  Patient Pre-hospital Diet Restrictions: None    Meds/Allergies   I have reviewed home medications with patient personally.  Prior to Admission medications    Medication Sig Start Date End Date Taking? Authorizing Provider   Brivaracetam 100 MG TABS Take 100 mg by mouth in the morning Takes in am.    Historical Provider, MD   Multiple Vitamins-Minerals (CENTRUM SILVER PO) Take by mouth  Patient not taking: Reported on 10/11/2024    Historical Provider, MD   ondansetron (ZOFRAN) 4 mg tablet Take 1 tablet (4 mg total) by mouth every 8 (eight) hours as needed for nausea or vomiting 10/11/24   MONSE Vincent   oxybutynin (DITROPAN-XL) 5 mg 24 hr tablet Take 1 tablet (5 mg total) by mouth daily as needed (As needed for bladder spasms or stent discomfort) 10/21/24   Ritchie Gates DO   oxyCODONE (ROXICODONE) 10 MG TABS Take 1 tablet (10 mg total) by mouth every 6 (six) hours as needed for moderate pain Max Daily Amount: 40 mg 10/11/24   MONSE Vincent   sulfamethoxazole-trimethoprim  (BACTRIM DS) 800-160 mg per tablet Take 1 tablet by mouth every 12 (twelve) hours for 3 days 10/21/24 10/24/24  Ritchie Gates DO   tamsulosin (FLOMAX) 0.4 mg Take 1 capsule (0.4 mg total) by mouth daily with dinner 10/21/24   Ritchie Gates DO     No Known Allergies    Objective :  Temp:  [96.6 °F (35.9 °C)-98.2 °F (36.8 °C)] 98.2 °F (36.8 °C)  HR:  [71-96] 72  BP: (105-165)/() 105/57  Resp:  [12-18] 16  SpO2:  [96 %-100 %] 96 %  O2 Device: None (Room air)    Physical Exam  Vitals and nursing note reviewed.   Constitutional:       General: He is not in acute distress.     Appearance: He is obese. He is not ill-appearing or diaphoretic.   HENT:      Head: Normocephalic.      Nose: Nose normal.      Mouth/Throat:      Pharynx: Oropharynx is clear.   Eyes:      General: No scleral icterus.     Conjunctiva/sclera: Conjunctivae normal.   Cardiovascular:      Rate and Rhythm: Normal rate and regular rhythm.      Pulses: Normal pulses.      Heart sounds: Normal heart sounds.   Pulmonary:      Effort: Pulmonary effort is normal. No respiratory distress.      Breath sounds: Normal breath sounds. No wheezing, rhonchi or rales.   Chest:      Chest wall: No tenderness.   Abdominal:      General: Bowel sounds are normal. There is no distension.      Palpations: Abdomen is soft.      Tenderness: There is abdominal tenderness in the suprapubic area. There is right CVA tenderness.   Genitourinary:     Comments: Barboza in situ draining pink tinged urine  Musculoskeletal:         General: Normal range of motion.      Cervical back: Normal range of motion.   Skin:     General: Skin is warm and dry.      Capillary Refill: Capillary refill takes 2 to 3 seconds.   Neurological:      Mental Status: He is alert and oriented to person, place, and time.        Lines/Drains:  Lines/Drains/Airways       Active Status       Name Placement date Placement time Site Days    Urethral Catheter Coude 18 Fr. 10/22/24  0101  Coude   less than 1                  Urinary Catheter:  Goal for removal:  Maintain paul per urology's request               Lab Results: I have reviewed the following results:  Results from last 7 days   Lab Units 10/21/24  2312   WBC Thousand/uL 12.99*   HEMOGLOBIN g/dL 11.9*   HEMATOCRIT % 37.1   PLATELETS Thousands/uL 275   BANDS PCT % 2   LYMPHO PCT % 4*   MONO PCT % 0*   EOS PCT % 0     Results from last 7 days   Lab Units 10/21/24  2312   SODIUM mmol/L 135   POTASSIUM mmol/L 3.8   CHLORIDE mmol/L 99   CO2 mmol/L 25   BUN mg/dL 10   CREATININE mg/dL 1.08   ANION GAP mmol/L 11   CALCIUM mg/dL 9.4   ALBUMIN g/dL 4.9   TOTAL BILIRUBIN mg/dL 0.49   ALK PHOS U/L 55   ALT U/L 54*   AST U/L 39   GLUCOSE RANDOM mg/dL 157*             Lab Results   Component Value Date    HGBA1C 5.8 (H) 08/15/2022           Imaging Results Review: I reviewed radiology reports from this admission including: CT abdomen/pelvis.  Other Study Results Review: No additional pertinent studies reviewed.    Administrative Statements   I have spent a total time of 70 minutes in caring for this patient on the day of the visit/encounter including Diagnostic results, Instructions for management, Patient and family education, Counseling / Coordination of care, Documenting in the medical record, Reviewing / ordering tests, medicine, procedures  , Obtaining or reviewing history  , and Communicating with other healthcare professionals .    ** Please Note: This note has been constructed using a voice recognition system. **

## 2024-10-22 NOTE — TELEPHONE ENCOUNTER
"Reason for Disposition  • [1] Caller has URGENT question AND [2] triager unable to answer question    Answer Assessment - Initial Assessment Questions  1. SYMPTOM: \"What's the main symptom you're concerned about?\" (e.g., pain, fever, vomiting)      Severe pain for the past 1-2 hours and unable to empty his bladder completely.  Sometimes just a few drops, other times a little more than a few drops    2. ONSET: \"When did start?\"      1-2 hours ago    3. SURGERY: \"What surgery did you have?\"      Cystoscopy ureteroscopy with lithotripsy and stent    4. DATE of SURGERY: \"When was the surgery?\"       10/21/24    5. PAIN: \"Is there any pain?\" If Yes, ask: \"How bad is it?\"  (Scale 1-10; or mild, moderate, severe)      Severe and constant     6. FEVER: \"Do you have a fever?\" If Yes, ask: \"What is your temperature, how was it measured, and when did it start?\"      Denies    7. OTHER SYMPTOMS: \"Do you have any other symptoms?\" (e.g., drainage from wound, painful urination, constipation)       Feels like going to pass out    Taking tamsulosin and oxybutynin 1-1.5 hours ago..  Took tylenol 1000mg 10 minutes ago.  Nothing for pain taken before the Tylenol.  Patient has five tablets of 10 mg Roxicodone left that was prescribed on 10/11/2024.  Wife would like to know if he can take one.    Protocols used: Post-Op Symptoms and Questions-[facility]    "

## 2024-10-22 NOTE — ED NOTES
RN attempted placing 22 three way paul catheter with bryce RN, it was unsuccessful. Provider made aware.      Nory Lomax RN  10/22/24 [id number]

## 2024-10-22 NOTE — ASSESSMENT & PLAN NOTE
-POD1 cystoscopy, ureteroscopy, holmium laser lithotripsy, retrograde pyelogram, insertion of R ureteral stent  -VSS, afebrile  -WBC 13, reactive, possible UTI  -CT showing moderate R hydro, urinary bladder distended. Ureteral stent in good position. Perinephric and periureteral fluid suggesting calcyceal or ureteral perforation.   -CT reviewed by attending. Keep stent, place paul for decompression  -Pain has improved since admission but patient reports not controlled  -Oxybutinin, flomax, pyridium for stent discomfort  -Would keep paul on discharge with outpatient TOV  -No acute  surgical intervention  -Urology will follow. Discharge when pain controlled.

## 2024-10-22 NOTE — PLAN OF CARE
Problem: PAIN - ADULT  Goal: Verbalizes/displays adequate comfort level or baseline comfort level  Description: Interventions:  - Encourage patient to monitor pain and request assistance  - Assess pain using appropriate pain scale  - Administer analgesics based on type and severity of pain and evaluate response  - Implement non-pharmacological measures as appropriate and evaluate response  - Consider cultural and social influences on pain and pain management  - Notify physician/advanced practitioner if interventions unsuccessful or patient reports new pain  Outcome: Progressing     Problem: INFECTION - ADULT  Goal: Absence or prevention of progression during hospitalization  Description: INTERVENTIONS:  - Assess and monitor for signs and symptoms of infection  - Monitor lab/diagnostic results  - Monitor all insertion sites, i.e. indwelling lines, tubes, and drains  - Monitor endotracheal if appropriate and nasal secretions for changes in amount and color  - [place]ble appropriate cooling/warming therapies per order  - Administer medications as ordered  - Instruct and encourage patient and family to use good hand hygiene technique  - Identify and instruct in appropriate isolation precautions for identified infection/condition  Outcome: Progressing  Goal: Absence of fever/infection during neutropenic period  Description: INTERVENTIONS:  - Monitor WBC    Outcome: Progressing     Problem: SAFETY ADULT  Goal: Patient will remain free of falls  Description: INTERVENTIONS:  - Educate patient/family on patient safety including physical limitations  - Instruct patient to call for assistance with activity   - Consult OT/PT to assist with strengthening/mobility   - Keep Call bell within reach  - Keep bed low and locked with side rails adjusted as appropriate  - Keep care items and personal belongings within reach  - Initiate and maintain comfort rounds  - Make Fall Risk Sign visible to staff  - Apply yellow socks and bracelet  for high fall risk patients  - Consider moving patient to room near nurses station  Outcome: Progressing  Goal: Maintain or return to baseline ADL function  Description: INTERVENTIONS:  -  Assess patient's ability to carry out ADLs; assess patient's baseline for ADL function and identify physical deficits which impact ability to perform ADLs (bathing, care of mouth/teeth, toileting, grooming, dressing, etc.)  - Assess/evaluate cause of self-care deficits   - Assess range of motion  - Assess patient's mobility; develop plan if impaired  - Assess patient's need for assistive devices and provide as appropriate  - Encourage maximum independence but intervene and supervise when necessary  - Involve family in performance of ADLs  - Assess for home care needs following discharge   - Consider OT consult to assist with ADL evaluation and planning for discharge  - Provide patient education as appropriate  Outcome: Progressing  Goal: Maintains/Returns to pre admission functional level  Description: INTERVENTIONS:  - Perform AM-PAC 6 Click Basic Mobility/ Daily Activity assessment daily.  - Set and communicate daily mobility goal to care team and patient/family/caregiver.   - Collaborate with rehabilitation services on mobility goals if consulted  - Out of bed for toileting  - Record patient progress and toleration of activity level   Outcome: Progressing     Problem: DISCHARGE PLANNING  Goal: Discharge to home or other facility with appropriate resources  Description: INTERVENTIONS:  - Identify barriers to discharge w/patient and caregiver  - Arrange for needed discharge resources and transportation as appropriate  - Identify discharge learning needs (meds, wound care, etc.)  - Arrange for interpretive services to assist at discharge as needed  - Refer to Case Management Department for coordinating discharge planning if the patient needs post-hospital services based on physician/advanced practitioner order or complex needs  related to functional status, cognitive ability, or social support system  Outcome: Progressing     Problem: Knowledge Deficit  Goal: Patient/family/caregiver demonstrates understanding of disease process, treatment plan, medications, and discharge instructions  Description: Complete learning assessment and assess knowledge base.  Interventions:  - Provide teaching at level of understanding  - Provide teaching via preferred learning methods  Outcome: Progressing

## 2024-10-22 NOTE — ASSESSMENT & PLAN NOTE
"Presentation: Patient presented with complaints of severe right sided flank pain and urinary urgency. Patient was recently diagnosed with staghorn renal calculi with subsequent stent placement and lithotripsy performed on 10/21/2024. Patient was discharged home after procedure and developed severe, waxing and waning right sided flank pain. Patient reports increased urgency to urinate without dysuria or gross hematuria. Patient was discharged home on PO Bactrim. Patient reports taking 1 oxycodone around 21:30 without relief; prompting him to present to the ED for further evaluation. Patient denies fever, chills, nausea, vomiting, chest pain or shortness of breath.  CT: \" Moderate right hydronephrosis. Ureteral stent in expected position. Significant perinephric and periureteral fluid and calculi medial to the right ureteropelvic junction suggesting calyceal or ureteral perforation. Stone fragments within the renal pelvis and proximal ureter. Nonobstructing calculi measuring up to 6.6 mm.\"  Urine micro: Innumerable RBCs, WBC 2-4, occasional bacteria.  Barboza catheter placed in ED.  Strict I&Os.  IV Hydration.  Pain control.  Received IV Rocephin in ED, continue with IV Cefepime due to recent instrumentation.  Continue Flomax and Oxybutynin.  Consult Urology.  "

## 2024-10-22 NOTE — ED PROVIDER NOTES
"Time reflects when diagnosis was documented in both MDM as applicable and the Disposition within this note       Time User Action Codes Description Comment    10/21/2024 11:54 PM Dominic Fuentes Add [N23] Renal colic     10/21/2024 11:54 PM Dominic Fuentes Add [N20.9] Urolithiasis     10/21/2024 11:54 PM Dominic Fuentes Add [R33.8] Acute urinary retention     10/22/2024  2:00 AM Dominic Fuentes Add [N99.71] Ureteral perforation secondary to stent manipulation     10/22/2024  2:01 AM Dominic Fuentes Remove [N99.71] Ureteral perforation secondary to stent manipulation           ED Disposition       ED Disposition   Admit    Condition   Stable    Date/Time   Tue Oct 22, 2024  2:09 AM    Comment   Case was discussed with james and the patient's admission status was agreed to be Admission Status: observation status to the service of Dr. Lowry .               Assessment & Plan       Medical Decision Making  Patient with history as below presented to triage with CC: \"Patient presents with:  Abdominal Pain: Pt comes via ems c/o right upper and lower abdominal pain. Unable to empty bladder fully, having some burning and frequency. Had a cystoscopy urethroscopy w stent placed today d/c w 6 kidney stones took 10mg of oxycodone and 1000mg tylenol @2100. +nausea    \"  Hx obtained from pt and wife    44-year-old male with history of staghorn calculi (right side), with lithotripsy and ureteral stent placement today presenting with acute right-sided flank pain and urinary retention.  Patient with severe pain on presentation, otherwise hemodynamically stable and afebrile.  Bladder scan significant for 600 mL of urine.  Patient given pain control, nausea control and Barboza placement.  Differential diagnosis includes but is not limited to renal colic, ureteral stent malfunction, acute urinary retention, considerably less likely malignancy, UTI/septic stone, AAA, ACS.    Plan: Labs, CT imaging, UA, pain control, antiemetics, urology consult, bladder " scan, Barboza catheter for acutely retaining, IV antibiotics, likely admission for pain control.    DDx including but not limited to: renal colic, pyelonephritis, UTI, GI etiology, appendicitis, diverticulitis, pancreatitis, cholecystitis, biliary colic, AAA, rhabdomyolysis, tumor, zoster.      I have independently ordered, reviewed and interpreted the following: labs and/or imaging and/or EKG studies listed below  Reviewed external records including notes, and prior labs/imaging results.    Disposition: Patient condition, stable.  Discussed need for admission with patient for further management and workup with pt and they are agreeable with plan. Discussed case with [facility] hospitalist , who agreed to admit patient to obs status.     See ED Course for further MDM.      PLEASE NOTE:  This encounter was completed utilizing the [employer] Direct Speech Voice Recognition Software. Grammatical errors, random word insertions, pronoun errors and incomplete sentences are occasional inherent consequences of the system due to software limitations, ambient noise and hardware issues.These may be missed by proof reading prior to affixing electronic signature. Any questions or concerns about the content, text or information contained within the body of this dictation should be directly addressed to the physician for clarification. Please do not hesitate to call me directly if you have any questions or concerns.      Amount and/or Complexity of Data Reviewed  Independent Historian: spouse  External Data Reviewed: labs, radiology, ECG and notes.  Labs: ordered. Decision-making details documented in ED Course.  Radiology: ordered. Decision-making details documented in ED Course.    Risk  Prescription drug management.  Decision regarding hospitalization.        ED Course as of 10/22/24 0248   Mon Oct 21, 2024   1887 Nursing called me to the patient's room as he is experiencing severe pain.  Will not lie still on the stretcher.   Reviewed chart, patient with recent lithotripsy and ureteral stent placement for right-sided staghorn calculi.  Suspect pain secondary to renal stone passing.  Will order pain control, nausea medications and IV fluids.   2337 On reevaluation patient with significant improvement in his pain.  At this time resting comfortably.   2339 CBC and differential(!)  Leukocytosis at 13.  Mild anemia at 11.9.   2342 Comprehensive metabolic panel(!)  No acute electrolyte abnormality.  Renal function at baseline.  Glucose of 157.  No gross LFT elevation.   2342 LIPASE: 20  WNL.   2353 Patient with bladder scan over 600, will place Barboza catheter as patient with acute urinary retention.   Tue Oct 22, 2024   0053 Nursing staff unable to pass a 22 French three-way catheter, and no smaller size available for three-way catheters.  Will place regular 20 French catheter.   0112 CT abdomen pelvis with contrast  right perirenal fluid and calculi medial to the UPJ suggesting calyceal or ureteral rupture. Stent in position. Moderate hydro. Thanks   0112 Leukocytes, UA(!): Small   0112 Nitrite, UA: Negative   0112 Blood, UA(!): Large   0115 CT abdomen pelvis with contrast  1. Moderate right hydronephrosis. Ureteral stent in expected position. Significant perinephric and periureteral fluid and calculi medial to the right ureteropelvic junction suggesting calyceal or ureteral perforation. Recommend urology consultation.  2. Stone fragments within the renal pelvis and proximal ureter. Nonobstructing calculi measuring up to 6.6 mm.     0118 WBC, UA(!): 2-4   0121 RBC Urine(!): Innumerable   0202 Discussed with urology on-call, recommended Barboza placement and admission to the hospital for pain control.  Patient with significant relief of pain after Dilaudid, Toradol.       Medications   ondansetron (ZOFRAN) injection 4 mg (4 mg Intravenous Given 10/21/24 2311)   sodium chloride 0.9 % bolus 1,000 mL (1,000 mL Intravenous New Bag 10/21/24 2309)    HYDROmorphone (DILAUDID) injection 0.5 mg (0.5 mg Intravenous Given 10/21/24 2309)   ketorolac (TORADOL) injection 15 mg (15 mg Intravenous Given 10/21/24 2310)   lidocaine (URO-JET) 2 % urethral/mucosal gel 1 Application (1 Application Urethral Given 10/22/24 0040)   iohexol (OMNIPAQUE) 350 MG/ML injection (MULTI-DOSE) 100 mL (100 mL Intravenous Given 10/22/24 0021)   HYDROmorphone (DILAUDID) injection 0.5 mg (0.5 mg Intravenous Given 10/22/24 0030)   ceftriaxone (ROCEPHIN) 1 g/50 mL in dextrose IVPB (1,000 mg Intravenous New Bag 10/22/24 0152)       ED Risk Strat Scores                                               History of Present Illness       Chief Complaint   Patient presents with    Abdominal Pain     Pt comes via ems c/o right upper and lower abdominal pain. Unable to empty bladder fully, having some burning and frequency. Had a cystoscopy urethroscopy w stent placed today d/c w 6 kidney stones took 10mg of oxycodone and 1000mg tylenol @2100. +nausea         Past Medical History:   Diagnosis Date    Generalized seizure disorder (HCC)     Hip strain, left, subsequent encounter     Kidney stone     Seizures (HCC)     Shoulder strain, left, subsequent encounter       Past Surgical History:   Procedure Laterality Date    LITHOTRIPSY        Family History   Problem Relation Age of Onset    No Known Problems Mother     Hypertension Father     Diabetes Father     No Known Problems Brother       Social History     Tobacco Use    Smoking status: Never    Smokeless tobacco: Never    Tobacco comments:     Occasional cigar and cigg when drinks   Vaping Use    Vaping status: Never Used   Substance Use Topics    Alcohol use: Yes     Comment: 2-3 drinks on weekend    Drug use: No      E-Cigarette/Vaping    E-Cigarette Use Never User       E-Cigarette/Vaping Substances    Nicotine No     THC No     CBD No     Flavoring No     Other No     Unknown No       I have reviewed and agree with the history as documented.      44-year-old male with history of recently diagnosed staghorn renal calculi, with subsequent stent placement and lithotripsy done today presenting to the ED with complaints of severe right-sided flank pain and urinary urgency.  Patient had procedure with urology done today, was sent home and was initially doing well until he started to feel severe waxing and waning right-sided flank pain.  Feels bloating in his abdomen, along with the urge to pee without dysuria or gross hematuria.  Patient otherwise denies fever, chills, nausea, vomiting, chest pain, shortness of breath, rash.  Reports taking 1 oxycodone around 9:30 PM today without relief.        Review of Systems   Constitutional:  Negative for chills and fever.   HENT:  Negative for congestion and sore throat.    Eyes:  Negative for photophobia, pain, redness and visual disturbance.   Respiratory:  Negative for cough, chest tightness and shortness of breath.    Cardiovascular:  Negative for chest pain, palpitations and leg swelling.   Gastrointestinal:  Positive for abdominal distention and abdominal pain. Negative for constipation, diarrhea, nausea and vomiting.   Genitourinary:  Positive for decreased urine volume, difficulty urinating, flank pain and urgency. Negative for dysuria, frequency, hematuria and testicular pain.   Musculoskeletal:  Negative for arthralgias, back pain, neck pain and neck stiffness.   Skin:  Negative for color change and rash.   Neurological:  Negative for dizziness, seizures, syncope, light-headedness, numbness and headaches.   All other systems reviewed and are negative.          Objective       ED Triage Vitals [10/21/24 2251]   Temperature Pulse Blood Pressure Respirations SpO2 Patient Position - Orthostatic VS   98.2 °F (36.8 °C) 74 (!) 165/106 16 98 % Lying      Temp Source Heart Rate Source BP Location FiO2 (%) Pain Score    Oral Monitor Right arm -- 10 - Worst Possible Pain      Vitals      Date and Time Temp Pulse SpO2  Resp BP Pain Score FACES Pain Rating User   10/22/24 0100 -- 94 99 % 16 133/89 7 --    10/22/24 0030 -- -- -- -- -- 10 - Worst Possible Pain -- SM   10/21/24 2308 -- 78 98 % -- 151/91 -- --    10/21/24 2251 98.2 °F (36.8 °C) 74 98 % 16 165/106 10 - Worst Possible Pain --             Physical Exam  Vitals and nursing note reviewed.   Constitutional:       General: He is in acute distress.      Appearance: He is well-developed. He is ill-appearing. He is not toxic-appearing.   HENT:      Head: Normocephalic and atraumatic.      Right Ear: External ear normal.      Left Ear: External ear normal.      Nose: Nose normal. No congestion.      Mouth/Throat:      Mouth: Mucous membranes are moist.      Pharynx: Oropharynx is clear. No oropharyngeal exudate or posterior oropharyngeal erythema.   Eyes:      Extraocular Movements: Extraocular movements intact.      Conjunctiva/sclera: Conjunctivae normal.      Pupils: Pupils are equal, round, and reactive to light.   Cardiovascular:      Rate and Rhythm: Normal rate and regular rhythm.      Pulses: Normal pulses.      Heart sounds: Normal heart sounds. No murmur heard.     No friction rub. No gallop.   Pulmonary:      Effort: Pulmonary effort is normal. No respiratory distress.      Breath sounds: Normal breath sounds. No stridor. No wheezing, rhonchi or rales.   Abdominal:      General: Bowel sounds are normal.      Palpations: Abdomen is soft.      Tenderness: There is abdominal tenderness in the suprapubic area. There is right CVA tenderness. There is no left CVA tenderness, guarding or rebound. Negative signs include Daniels's sign and McBurney's sign.   Musculoskeletal:         General: No swelling, tenderness or deformity. Normal range of motion.      Cervical back: Normal range of motion and neck supple. No rigidity or tenderness.      Right lower leg: No edema.      Left lower leg: No edema.   Lymphadenopathy:      Cervical: No cervical adenopathy.   Skin:      General: Skin is warm and dry.      Capillary Refill: Capillary refill takes less than 2 seconds.      Coloration: Skin is not jaundiced or pale.      Findings: No bruising, erythema, lesion or rash.   Neurological:      General: No focal deficit present.      Mental Status: He is alert and oriented to person, place, and time. Mental status is at baseline.      GCS: GCS eye subscore is 4. GCS verbal subscore is 5. GCS motor subscore is 6.      Cranial Nerves: Cranial nerves 2-12 are intact. No cranial nerve deficit, dysarthria or facial asymmetry.      Sensory: Sensation is intact. No sensory deficit.      Motor: Motor function is intact. No abnormal muscle tone or pronator drift.      Coordination: Coordination is intact.      Gait: Gait is intact.   Psychiatric:         Mood and Affect: Mood normal.         Behavior: Behavior normal.         Thought Content: Thought content normal.         Results Reviewed       Procedure Component Value Units Date/Time    Urine culture [[id number]]     Lab Status: No result Specimen: Urine, Other     Urine Microscopic [[id number]]  (Abnormal) Collected: 10/22/24 0101    Lab Status: Final result Specimen: Urine, Indwelling Barboza Catheter Updated: 10/22/24 0119     RBC, UA Innumerable /hpf      WBC, UA 2-4 /hpf      Epithelial Cells None Seen /hpf      Bacteria, UA Occasional /hpf     UA w Reflex to Microscopic w Reflex to Culture [[id number]]  (Abnormal) Collected: 10/22/24 0101    Lab Status: Final result Specimen: Urine, Indwelling Barboza Catheter Updated: 10/22/24 0111     Color, UA Brown     Clarity, UA Turbid     Specific Gravity, UA 1.012     pH, UA 7.0     Leukocytes, UA Small     Nitrite, UA Negative     Protein, UA 50 (1+) mg/dl      Glucose, UA Negative mg/dl      Ketones, UA Negative mg/dl      Urobilinogen, UA <2.0 mg/dl      Bilirubin, UA Negative     Occult Blood, UA Large    RBC Morphology Reflex Test [[id number]] Collected: 10/21/24 2312    Lab Status: Final result  Specimen: Blood from Arm, Left Updated: 10/22/24 0101    CBC and differential [[id number]]  (Abnormal) Collected: 10/21/24 2312    Lab Status: Final result Specimen: Blood from Arm, Left Updated: 10/22/24 0002     WBC 12.99 Thousand/uL      RBC 4.23 Million/uL      Hemoglobin 11.9 g/dL      Hematocrit 37.1 %      MCV 88 fL      MCH 28.1 pg      MCHC 32.1 g/dL      RDW 12.6 %      MPV 10.9 fL      Platelets 275 Thousands/uL     Narrative:      This is an appended report.  These results have been appended to a previously verified report.    Manual Differential(PHLEBS Do Not Order) [[id number]]  (Abnormal) Collected: 10/21/24 2312    Lab Status: Final result Specimen: Blood from Arm, Left Updated: 10/22/24 0002     Segmented % 94 %      Bands % 2 %      Lymphocytes % 4 %      Monocytes % 0 %      Eosinophils % 0 %      Basophils % 0 %      Absolute Neutrophils 12.47 Thousand/uL      Absolute Lymphocytes 0.52 Thousand/uL      Absolute Monocytes 0.00 Thousand/uL      Absolute Eosinophils 0.00 Thousand/uL      Absolute Basophils 0.00 Thousand/uL      Total Counted --     RBC Morphology Present     Platelet Estimate Adequate     Anisocytosis Present     Poikilocytes Present     Polychromasia Present    Comprehensive metabolic panel [[id number]]  (Abnormal) Collected: 10/21/24 2312    Lab Status: Final result Specimen: Blood from Arm, Left Updated: 10/21/24 2341     Sodium 135 mmol/L      Potassium 3.8 mmol/L      Chloride 99 mmol/L      CO2 25 mmol/L      ANION GAP 11 mmol/L      BUN 10 mg/dL      Creatinine 1.08 mg/dL      Glucose 157 mg/dL      Calcium 9.4 mg/dL      AST 39 U/L      ALT 54 U/L      Alkaline Phosphatase 55 U/L      Total Protein 7.8 g/dL      Albumin 4.9 g/dL      Total Bilirubin 0.49 mg/dL      eGFR 84 ml/min/1.73sq m     Narrative:      [employer] guidelines for Chronic Kidney Disease (CKD):     Stage 1 with normal or high GFR (GFR > 90 mL/min/1.73 square meters)    Stage 2 Mild CKD  (GFR = 60-89 mL/min/1.73 square meters)    Stage 3A Moderate CKD (GFR = 45-59 mL/min/1.73 square meters)    Stage 3B Moderate CKD (GFR = 30-44 mL/min/1.73 square meters)    Stage 4 Severe CKD (GFR = 15-29 mL/min/1.73 square meters)    Stage 5 End Stage CKD (GFR <15 mL/min/1.73 square meters)  Note: GFR calculation is accurate only with a steady state creatinine    Lipase [[id number]]  (Normal) Collected: 10/21/24 2312    Lab Status: Final result Specimen: Blood from Arm, Left Updated: 10/21/24 2341     Lipase 20 u/L             CT abdomen pelvis with contrast   Final Interpretation by Jhonatan No MD (10/22 0112)         1. Moderate right hydronephrosis. Ureteral stent in expected position. Significant perinephric and periureteral fluid and calculi medial to the right ureteropelvic junction suggesting calyceal or ureteral perforation. Recommend urology consultation.   2. Stone fragments within the renal pelvis and proximal ureter. Nonobstructing calculi measuring up to 6.6 mm.         I personally discussed this study with STEVEN FORMAN on 10/22/2024 1:10 AM.            Workstation performed: [id number]             ECG 12 Lead Documentation Only    Date/Time: 10/22/2024 2:23 AM    Performed by: Dominic Fuentes DO  Authorized by: Dominic Fuentes DO    Indications / Diagnosis:  Severe pain  ECG reviewed by me, the ED Provider: yes    Patient location:  ED  Previous ECG:     Previous ECG:  Compared to current    Comparison ECG info:  9/20/2024.    Similarity:  No change  Interpretation:     Interpretation: non-specific    Rate:     ECG rate:  125    ECG rate assessment: tachycardic    Rhythm:     Rhythm: sinus tachycardia    Ectopy:     Ectopy: none    QRS:     QRS axis:  Normal    QRS intervals:  Normal  Conduction:     Conduction: normal    ST segments:     ST segments:  Normal  T waves:     T waves: normal        ED Medication and Procedure Management   Prior to Admission Medications   Prescriptions Last Dose  Informant Patient Reported? Taking?   Brivaracetam 100 MG TABS   Yes No   Sig: Take 100 mg by mouth in the morning Takes in am.   Multiple Vitamins-Minerals (CENTRUM SILVER PO)   Yes No   Sig: Take by mouth   Patient not taking: Reported on 10/11/2024   ondansetron (ZOFRAN) 4 mg tablet   No No   Sig: Take 1 tablet (4 mg total) by mouth every 8 (eight) hours as needed for nausea or vomiting   oxyCODONE (ROXICODONE) 10 MG TABS   No No   Sig: Take 1 tablet (10 mg total) by mouth every 6 (six) hours as needed for moderate pain Max Daily Amount: 40 mg   oxybutynin (DITROPAN-XL) 5 mg 24 hr tablet   No No   Sig: Take 1 tablet (5 mg total) by mouth daily as needed (As needed for bladder spasms or stent discomfort)   sulfamethoxazole-trimethoprim (BACTRIM DS) 800-160 mg per tablet   No No   Sig: Take 1 tablet by mouth every 12 (twelve) hours for 3 days   tamsulosin (FLOMAX) 0.4 mg   No No   Sig: Take 1 capsule (0.4 mg total) by mouth daily with dinner      Facility-Administered Medications: None     Patient's Medications   Discharge Prescriptions    No medications on file     No discharge procedures on file.  ED SEPSIS DOCUMENTATION   Time reflects when diagnosis was documented in both MDM as applicable and the Disposition within this note       Time User Action Codes Description Comment    10/21/2024 11:54 PM Dominic Fuentes Add [N23] Renal colic     10/21/2024 11:54 PM Dominic Fuentes Add [N20.9] Urolithiasis     10/21/2024 11:54 PM Dominic Fuentes Add [R33.8] Acute urinary retention     10/22/2024  2:00 AM Dominic Fuentes Add [N99.71] Ureteral perforation secondary to stent manipulation     10/22/2024  2:01 AM Dominic Fuentes Remove [N99.71] Ureteral perforation secondary to stent manipulation            Initial Sepsis Screening       Row Name 10/22/24 0149                Is the patient's history suggestive of a new or worsening infection? No  -HA                  User Key  (r) = Recorded By, (t) = Taken By, (c) = Cosigned By       Initials Name Provider Type    DANYELLE Fuentes DO Resident                       Dominic Fuentes,   10/22/24 0245       Dominic Fuentes,   10/22/24 0248

## 2024-10-22 NOTE — ASSESSMENT & PLAN NOTE
Possible UTI  Urgency, no dyuria. Likely stent related  -preop urine cx no growth  -Leukocytosis likely reactive  -continue abx, discontinue if cx negative

## 2024-10-22 NOTE — CONSULTS
Consultation - Urology   Name: Marjorie Castro 44 y.o. male I MRN: [id number]  Unit/Bed#: -01 I Date of Admission: 10/21/2024   Date of Service: 10/22/2024 I Hospital Day: 0   Inpatient consult to Urology  Consult performed by: Daria Davidson PA-C  Consult ordered by: MONSE Bruce      Inpatient consult to Urology  Consult performed by: Daria Davidson PA-C  Consult ordered by: Norm Rees MD        Physician Requesting Evaluation: Rick Ontiveros*   Reason for Evaluation / Principal Problem: Stent colic, postop ureteroscopy, possible UTI    Assessment & Plan  Renal colic on right side with hydronephrosis  -POD1 cystoscopy, ureteroscopy, holmium laser lithotripsy, retrograde pyelogram, insertion of R ureteral stent  -VSS, afebrile  -WBC 13, reactive, possible UTI  -CT showing moderate R hydro, urinary bladder distended. Ureteral stent in good position. Perinephric and periureteral fluid suggesting calcyceal or ureteral perforation.   -CT reviewed by attending. Keep stent, place paul for decompression  -Pain has improved since admission but patient reports not controlled  -Oxybutinin, flomax, pyridium for stent discomfort  -Would keep paul on discharge with outpatient TOV  -No acute  surgical intervention  -Urology will follow. Discharge when pain controlled.   Leukocytosis  Reactive from procedure yesterday  Possible UTI  Preop urine cx negative  UTI (urinary tract infection)  Possible UTI  Urgency, no dyuria. Likely stent related  -preop urine cx no growth  -Leukocytosis likely reactive  -continue abx, discontinue if cx negative        Subjective:   HPI: Marjorie is a 43 yo male with PMH generalized seizure disorder, kidney stones who presented to the ED with severe right-sided flank pain and urinary urgency.  Patient underwent ureteroscopy on 10/21/2024 by Dr. Gurrola.  Patient reports postoperatively at home he developed severe right-sided flank pain, increased urgency to  urinate.  He was discharged with p.o. Bactrim.  He took oxycodone at home without relief.  He denies any fever, chills, shortness of breath, chest pain, nausea, vomiting.  In the ED he underwent a CT scan which showed a distended urinary bladder, moderate right hydronephrosis ureteral stent in expected position, perinephric and periureteral fluid medial to the right UPJ suggesting calyceal or ureteral perforation.  Nonobstructing calculi measuring 6.6 mm.  His UA micro showed innumerable RBCs, WBCs, occasional bacteria.  His preop urine culture was negative.  Patient was started empirically on IV Rocephin in the ED.  He was prescribed pain medication, Barboza catheter was inserted and his he was admitted to medicine for further management.  At this time patient reports improvement in his symptoms however still needs as needed pain medications. He does report GERD symptoms, likely related due to anesthesia yesterday.    Review of Systems   Constitutional:  Negative for chills and fever.   Respiratory:  Negative for cough and shortness of breath.    Cardiovascular:  Negative for chest pain and palpitations.   Gastrointestinal:  Positive for abdominal pain. Negative for vomiting.   Genitourinary:  Positive for flank pain. Negative for dysuria and hematuria.   Musculoskeletal:  Negative for arthralgias and back pain.   Skin:  Negative for color change and rash.   Neurological:  Negative for seizures and syncope.   All other systems reviewed and are negative.      Objective:    Vitals: Blood pressure 116/63, pulse 81, temperature 97.5 °F (36.4 °C), temperature source Oral, resp. rate 18, SpO2 99%.,There is no height or weight on file to calculate BMI.    Physical Exam  Constitutional:       General: He is not in acute distress.     Appearance: Normal appearance. He is normal weight. He is not ill-appearing or toxic-appearing.   HENT:      Head: Normocephalic and atraumatic.      Right Ear: External ear normal.      Left  Ear: External ear normal.      Nose: Nose normal.      Mouth/Throat:      Mouth: Mucous membranes are moist.   Eyes:      General: No scleral icterus.     Conjunctiva/sclera: Conjunctivae normal.   Cardiovascular:      Rate and Rhythm: Normal rate.      Pulses: Normal pulses.   Pulmonary:      Effort: Pulmonary effort is normal.   Abdominal:      General: Abdomen is flat. There is no distension.      Palpations: Abdomen is soft.      Tenderness: There is abdominal tenderness. There is no right CVA tenderness, left CVA tenderness or guarding.   Neurological:      General: No focal deficit present.      Mental Status: He is alert and oriented to person, place, and time. Mental status is at baseline.   Psychiatric:         Mood and Affect: Mood normal.         Behavior: Behavior normal.         Thought Content: Thought content normal.         Judgment: Judgment normal.         Imaging:  CT ABDOMEN AND PELVIS WITH IV CONTRAST     INDICATION: Right-sided 2.3 cm staghorn calculi, had lithotripsy and ureteral stent placement today.  Now presents with severe right-sided flank pain and urinary urgency.. .     COMPARISON: 9/20/2024.     TECHNIQUE: CT examination of the abdomen and pelvis was performed. Multiplanar 2D reformatted images were created from the source data.     This examination, like all CT scans performed in [facility], was performed utilizing techniques to minimize radiation dose exposure, including the use of iterative reconstruction and automated exposure control. Radiation dose length   product (DLP) for this visit: 942 mGy-cm     IV Contrast: 100 mL of iohexol (OMNIPAQUE)  Enteric Contrast: Not administered.     FINDINGS:     ABDOMEN     LOWER CHEST: Hypoventilatory changes at the lung bases.     LIVER/BILIARY TREE: Hepatic steatosis.     GALLBLADDER: No calcified gallstones. No pericholecystic inflammatory change.     SPLEEN: Unremarkable.     PANCREAS: Unremarkable.     ADRENAL  GLANDS: Unremarkable.     KIDNEYS/URETERS: Moderate right hydronephrosis. Right ureteral stent in expected position. Nonobstructing calculus measuring 6.6 mm. Stone fragments in the renal pelvis and ureteropelvic junction. Few small fragments in the proximal ureter. Distal ureter   is decompressed. Symmetric nephrographic phase enhancement of the kidneys.     STOMACH AND BOWEL: Prominent submucosal fat in the right colon could represent sequela of chronic inflammation. Diverticulosis without evidence of diverticulitis or colitis.     APPENDIX: Normal appendix.     ABDOMINOPELVIC CAVITY: Significant amount of fluid surrounding the right renal pelvis and in the right retroperitoneum centered around the proximal right ureter. Low-attenuation suggests urine.     Punctate focus of gas medial and superior to the right ureteropelvic junction. Adjacent hyperdense foci measuring up to 6 mm suggesting extra ureteral calculi.     VESSELS: Patent portal, splenic and mesenteric veins.     PELVIS     REPRODUCTIVE ORGANS: No prostate enlargement.     URINARY BLADDER: Punctate focus of gas in the nondependent portion of the bladder likely secondary to recent catheter manipulation.     ABDOMINAL WALL/INGUINAL REGIONS: Unremarkable.     BONES: No acute fracture, lytic or blastic lesion.     IMPRESSION:        1. Moderate right hydronephrosis. Ureteral stent in expected position. Significant perinephric and periureteral fluid and calculi medial to the right ureteropelvic junction suggesting calyceal or ureteral perforation. Recommend urology consultation.  2. Stone fragments within the renal pelvis and proximal ureter. Nonobstructing calculi measuring up to 6.6 mm.        I personally discussed this study with STEVEN FORMAN on 10/22/2024 1:10 AM.           Workstation performed: [id number]    Labs:  Recent Labs     10/21/24  2312 10/22/24  0512   WBC 12.99* 13.36*       Recent Labs     10/21/24  2312 10/22/24  0512   HGB 11.9*  10.2*     Recent Labs     10/21/24  2312 10/22/24  0512   HCT 37.1 32.4*     Recent Labs     10/21/24  2312 10/22/24  0512   CREATININE 1.08 1.04         History:    Past Medical History:   Diagnosis Date    Generalized seizure disorder (HCC)     Hip strain, left, subsequent encounter     Kidney stone     Seizures (HCC)     Shoulder strain, left, subsequent encounter      Social History     Socioeconomic History    Marital status: /Civil Union     Spouse name: None    Number of children: None    Years of education: None    Highest education level: None   Occupational History    None   Tobacco Use    Smoking status: Never    Smokeless tobacco: Never    Tobacco comments:     Occasional cigar and cigg when drinks   Vaping Use    Vaping status: Never Used   Substance and Sexual Activity    Alcohol use: Yes     Comment: 2-3 drinks on weekend    Drug use: No    Sexual activity: None   Other Topics Concern    None   Social History Narrative    None     Social Determinants of Health     Financial Resource Strain: Low Risk  (8/23/2023)    Received from [facility]    Overall Financial Resource Strain (CARDIA)     Difficulty of Paying Living Expenses: Not very hard   Food Insecurity: No Food Insecurity (8/23/2023)    Received from [facility]    Hunger Vital Sign     Worried About Running Out of Food in the Last Year: Never true     Ran Out of Food in the Last Year: Never true   Transportation Needs: No Transportation Needs (8/23/2023)    Received from [facility]    PRAPARE - Transportation     Lack of Transportation (Medical): No     Lack of Transportation (Non-Medical): No   Physical Activity: Not on file   Stress: Not on file   Social Connections: Unknown (6/18/2024)    Received from [email or web address]    Social [employer]     How often do you feel lonely or isolated from those around you? (Adult - for ages 18 years and over): Not on file   Intimate Partner Violence:  Unknown (8/23/2023)    Received from [facility]    Humiliation, Afraid, Rape, and Kick questionnaire     Fear of Current or Ex-Partner: No     Emotionally Abused: No     Physically Abused: No     Sexually Abused: Not on file   Housing Stability: Not on file     Past Surgical History:   Procedure Laterality Date    FL RETROGRADE PYELOGRAM  10/21/2024    LITHOTRIPSY      AR CYSTO/URETERO W/LITHOTRIPSY &INDWELL STENT INSRT Right 10/21/2024    Procedure: CYSTOSCOPY URETEROSCOPY WITH LITHOTRIPSY HOLMIUM LASER, RETROGRADE PYELOGRAM AND INSERTION STENT URETERAL;  Surgeon: Ritchie Gates DO;  [facility]: [facility] MAIN OR;  Service: Urology     Family History   Problem Relation Age of Onset    No Known Problems Mother     Hypertension Father     Diabetes Father     No Known Problems Brother        Daria Davidson PA-C  Date: 10/22/2024 Time: 10:51 AM

## 2024-10-22 NOTE — TELEPHONE ENCOUNTER
"Regarding: post op procedure  ----- Message from Joi GEORGE sent at 10/21/2024  9:09 PM EDT -----  \"My  had a procedure this morning for a kidney stone and he is in extreme pain that just keeps getting worse.\"    "

## 2024-10-23 PROBLEM — D72.829 LEUKOCYTOSIS: Status: RESOLVED | Noted: 2024-10-22 | Resolved: 2024-10-23

## 2024-10-23 PROBLEM — R82.90 ABNORMAL URINALYSIS: Status: ACTIVE | Noted: 2024-10-22

## 2024-10-23 PROBLEM — R04.0 EPISTAXIS: Status: ACTIVE | Noted: 2024-10-23

## 2024-10-23 LAB
ANION GAP SERPL CALCULATED.3IONS-SCNC: 7 MMOL/L (ref 4–13)
BACTERIA UR CULT: NORMAL
BASOPHILS # BLD AUTO: 0.04 THOUSANDS/ΜL (ref 0–0.1)
BASOPHILS NFR BLD AUTO: 0 % (ref 0–1)
BUN SERPL-MCNC: 10 MG/DL (ref 5–25)
CALCIUM SERPL-MCNC: 9 MG/DL (ref 8.4–10.2)
CHLORIDE SERPL-SCNC: 105 MMOL/L (ref 96–108)
CO2 SERPL-SCNC: 28 MMOL/L (ref 21–32)
CREAT SERPL-MCNC: 1.07 MG/DL (ref 0.6–1.3)
EOSINOPHIL # BLD AUTO: 0.07 THOUSAND/ΜL (ref 0–0.61)
EOSINOPHIL NFR BLD AUTO: 1 % (ref 0–6)
ERYTHROCYTE [DISTWIDTH] IN BLOOD BY AUTOMATED COUNT: 13 % (ref 11.6–15.1)
GFR SERPL CREATININE-BSD FRML MDRD: 85 ML/MIN/1.73SQ M
GLUCOSE P FAST SERPL-MCNC: 101 MG/DL (ref 65–99)
GLUCOSE SERPL-MCNC: 101 MG/DL (ref 65–140)
HCT VFR BLD AUTO: 33.2 % (ref 36.5–49.3)
HGB BLD-MCNC: 10.3 G/DL (ref 12–17)
IMM GRANULOCYTES # BLD AUTO: 0.04 THOUSAND/UL (ref 0–0.2)
IMM GRANULOCYTES NFR BLD AUTO: 0 % (ref 0–2)
LYMPHOCYTES # BLD AUTO: 2.5 THOUSANDS/ΜL (ref 0.6–4.47)
LYMPHOCYTES NFR BLD AUTO: 28 % (ref 14–44)
MCH RBC QN AUTO: 27.8 PG (ref 26.8–34.3)
MCHC RBC AUTO-ENTMCNC: 31 G/DL (ref 31.4–37.4)
MCV RBC AUTO: 90 FL (ref 82–98)
MONOCYTES # BLD AUTO: 0.7 THOUSAND/ΜL (ref 0.17–1.22)
MONOCYTES NFR BLD AUTO: 8 % (ref 4–12)
NEUTROPHILS # BLD AUTO: 5.64 THOUSANDS/ΜL (ref 1.85–7.62)
NEUTS SEG NFR BLD AUTO: 63 % (ref 43–75)
NRBC BLD AUTO-RTO: 0 /100 WBCS
PLATELET # BLD AUTO: 251 THOUSANDS/UL (ref 149–390)
PMV BLD AUTO: 10.4 FL (ref 8.9–12.7)
POTASSIUM SERPL-SCNC: 3.9 MMOL/L (ref 3.5–5.3)
RBC # BLD AUTO: 3.7 MILLION/UL (ref 3.88–5.62)
SODIUM SERPL-SCNC: 140 MMOL/L (ref 135–147)
WBC # BLD AUTO: 8.99 THOUSAND/UL (ref 4.31–10.16)

## 2024-10-23 PROCEDURE — 80048 BASIC METABOLIC PNL TOTAL CA: CPT | Performed by: INTERNAL MEDICINE

## 2024-10-23 PROCEDURE — 99232 SBSQ HOSP IP/OBS MODERATE 35: CPT | Performed by: UROLOGY

## 2024-10-23 PROCEDURE — 85025 COMPLETE CBC W/AUTO DIFF WBC: CPT | Performed by: INTERNAL MEDICINE

## 2024-10-23 PROCEDURE — 99232 SBSQ HOSP IP/OBS MODERATE 35: CPT | Performed by: INTERNAL MEDICINE

## 2024-10-23 RX ORDER — OXYCODONE HYDROCHLORIDE 5 MG/1
5 TABLET ORAL EVERY 6 HOURS PRN
Status: DISCONTINUED | OUTPATIENT
Start: 2024-10-23 | End: 2024-10-24 | Stop reason: HOSPADM

## 2024-10-23 RX ORDER — OXYMETAZOLINE HYDROCHLORIDE 0.05 G/100ML
1 SPRAY NASAL EVERY 12 HOURS PRN
Status: DISCONTINUED | OUTPATIENT
Start: 2024-10-23 | End: 2024-10-24 | Stop reason: HOSPADM

## 2024-10-23 RX ORDER — ECHINACEA PURPUREA EXTRACT 125 MG
1 TABLET ORAL
Status: DISCONTINUED | OUTPATIENT
Start: 2024-10-23 | End: 2024-10-24 | Stop reason: HOSPADM

## 2024-10-23 RX ADMIN — SODIUM CHLORIDE, SODIUM LACTATE, POTASSIUM CHLORIDE, AND CALCIUM CHLORIDE 125 ML/HR: .6; .31; .03; .02 INJECTION, SOLUTION INTRAVENOUS at 10:56

## 2024-10-23 RX ADMIN — OXYBUTYNIN CHLORIDE 5 MG: 5 TABLET ORAL at 16:23

## 2024-10-23 RX ADMIN — OXYBUTYNIN CHLORIDE 5 MG: 5 TABLET ORAL at 09:17

## 2024-10-23 RX ADMIN — PHENAZOPYRIDINE 100 MG: 100 TABLET ORAL at 16:23

## 2024-10-23 RX ADMIN — ENOXAPARIN SODIUM 40 MG: 40 INJECTION SUBCUTANEOUS at 09:18

## 2024-10-23 RX ADMIN — PANTOPRAZOLE SODIUM 40 MG: 40 TABLET, DELAYED RELEASE ORAL at 06:14

## 2024-10-23 RX ADMIN — TAMSULOSIN HYDROCHLORIDE 0.4 MG: 0.4 CAPSULE ORAL at 16:23

## 2024-10-23 RX ADMIN — CEFEPIME 2000 MG: 2 INJECTION, POWDER, FOR SOLUTION INTRAVENOUS at 09:17

## 2024-10-23 RX ADMIN — PHENAZOPYRIDINE 100 MG: 100 TABLET ORAL at 09:17

## 2024-10-23 RX ADMIN — PHENAZOPYRIDINE 100 MG: 100 TABLET ORAL at 13:04

## 2024-10-23 NOTE — ASSESSMENT & PLAN NOTE
"Presentation: Patient presented with complaints of severe right sided flank pain and urinary urgency. Patient was recently diagnosed with staghorn renal calculi with subsequent stent placement and lithotripsy performed on 10/21/2024. Patient was discharged home after procedure and developed severe, waxing and waning right sided flank pain. Patient reports increased urgency to urinate without dysuria or gross hematuria. Patient was discharged home on PO Bactrim. Patient reports taking 1 oxycodone around 21:30 without relief; prompting him to present to the ED for further evaluation. Patient denies fever, chills, nausea, vomiting, chest pain or shortness of breath.  CT: \" Moderate right hydronephrosis. Ureteral stent in expected position. Significant perinephric and periureteral fluid and calculi medial to the right ureteropelvic junction suggesting calyceal or ureteral perforation. Stone fragments within the renal pelvis and proximal ureter. Nonobstructing calculi measuring up to 6.6 mm.\"  Urine micro: Innumerable RBCs, WBC 2-4, occasional bacteria.  Barboza catheter placed in ED.  Strict I&Os.  IV Hydration.  Pain control.  Continue Flomax, pyridium and Oxybutynin.  Consult Urology: appreciate recommendations.  For TOV tomorrow.  "

## 2024-10-23 NOTE — PROGRESS NOTES
"Progress Note - Hospitalist   Name: Marjorie Castro 44 y.o. male I MRN: [id number]  Unit/Bed#: -01 I Date of Admission: 10/21/2024   Date of Service: 10/23/2024 I Hospital Day: 0     Assessment & Plan  Renal colic on right side with hydronephrosis  Presentation: Patient presented with complaints of severe right sided flank pain and urinary urgency. Patient was recently diagnosed with staghorn renal calculi with subsequent stent placement and lithotripsy performed on 10/21/2024. Patient was discharged home after procedure and developed severe, waxing and waning right sided flank pain. Patient reports increased urgency to urinate without dysuria or gross hematuria. Patient was discharged home on PO Bactrim. Patient reports taking 1 oxycodone around 21:30 without relief; prompting him to present to the ED for further evaluation. Patient denies fever, chills, nausea, vomiting, chest pain or shortness of breath.  CT: \" Moderate right hydronephrosis. Ureteral stent in expected position. Significant perinephric and periureteral fluid and calculi medial to the right ureteropelvic junction suggesting calyceal or ureteral perforation. Stone fragments within the renal pelvis and proximal ureter. Nonobstructing calculi measuring up to 6.6 mm.\"  Urine micro: Innumerable RBCs, WBC 2-4, occasional bacteria.  Barboza catheter placed in ED.  Strict I&Os.  IV Hydration.  Pain control.  Continue Flomax, pyridium and Oxybutynin.  Consult Urology: appreciate recommendations.  For TOV tomorrow.  Abnormal urinalysis  Patient complains of urinary urgency without dysuria or gross hematuria.   Urine micro: Innumerable RBCs, WBC 2-4, occasional bacteria.  Urine culture: No growth.  Received IV Rocephin in ED, was continued with IV Cefepime due to recent instrumentation.  However, with urine culture revealing no growth, antibiotic was discontinued.  Generalized seizure disorder (HCC)  Continue Brivaracetam.  Anemia  Stable.  Normocytic " anemia with normal RDW.  No noted or reported bleeding.  Possibility of hemodilution from IV fluids.  Monitor.  Dyspepsia  Continue PPI.  Continue antiemetic on as-needed basis.  Epistaxis  Patient complained of nosebleeding earlier today.  Due to this, he told me, that he had difficulty breathing. From my discussion with the patient's nurse, the nosebleeding was mild.  Patient admitted to being anxious as he is claustrophobic in the room at [facility] 1 floor.  Thus, I requested to the patient's nurse to possibly transfer the patient to a room upstairs.  Nosebleeding and difficulty breathing had resolved.  Nasal Ocean Spray and Afrin on as-needed basis.    VTE Pharmacologic Prophylaxis: VTE Score: 3 Moderate Risk (Score 3-4) - Pharmacological DVT Prophylaxis Ordered: enoxaparin (Lovenox).    Mobility:   Basic Mobility Inpatient Raw Score: 24  JH-HLM Goal: 8: Walk 250 feet or more  JH-HLM Achieved: 8: Walk 250 feet ot more  JH-HLM Goal achieved. Continue to encourage appropriate mobility.    Patient Centered Rounds: I performed bedside rounds with nursing staff today.   Discussions with Specialists or Other Care Team Provider: Case management.  Urology physician assistant.    Education and Discussions with Family / Patient: Updated  (wife, niece, and sister-in-law) at bedside.    Current Length of Stay: 0 day(s)  Current Patient Status: Observation   Certification Statement: The patient will continue to require additional inpatient hospital stay due to above findings and plans.  Discharge Plan: Anticipate discharge tomorrow to home.    Code Status: Level 1 - Full Code    Subjective   Patient was seen and examined this morning.  Patient had several complaints.  Patient still complained of back pains, most specially when he gets up.  He also attributed this to having a not comfortable bed.  He requested that the pain medication being given every 3 hours (IV Dilaudid was ordered for breakthrough pains), be  "discontinued so that he will be observed without it. He also complained of having nosebleeding that made his breathing difficult.  Patient's nosebleeding and difficulty breathing had resolved at the time I saw the patient.  Patient also felt uncomfortable with the Barboza catheter in place.  Patient also complained of nausea and having a poor appetite as well as generalized weakness.  Patient also admitted to feeling anxious and claustrophobic and called [facility] 1 floor, like a \"basement.\"  Requested as much as possible be transferred to floors upstairs to have a better room.      Objective :  Temp:  [98.2 °F (36.8 °C)-99.6 °F (37.6 °C)] 98.2 °F (36.8 °C)  HR:  [75-99] 75  BP: (126-139)/(66-89) 126/89  Resp:  [16-18] 16  SpO2:  [96 %-99 %] 99 %  O2 Device: None (Room air)    There is no height or weight on file to calculate BMI.     Input and Output Summary (last 24 hours):     Intake/Output Summary (Last 24 hours) at 10/23/2024 2004  Last data filed at 10/23/2024 1332  Gross per 24 hour   Intake 80 ml   Output 5450 ml   Net -5370 ml       Physical Exam  Vitals and nursing note reviewed. Exam conducted with a chaperone present.   Constitutional:       General: He is not in acute distress.     Appearance: He is not ill-appearing, toxic-appearing or diaphoretic.   HENT:      Nose: Nose normal.      Comments: No active epistaxis noted.  Cardiovascular:      Rate and Rhythm: Normal rate and regular rhythm.      Heart sounds: Normal heart sounds.   Pulmonary:      Effort: Pulmonary effort is normal. No respiratory distress.      Breath sounds: Normal breath sounds.   Abdominal:      General: Bowel sounds are normal. There is no distension.      Palpations: Abdomen is soft.      Tenderness: There is no abdominal tenderness. There is no right CVA tenderness, left CVA tenderness, guarding or rebound.   Musculoskeletal:         General: Tenderness present.      Right lower leg: No edema.      Left lower leg: No edema.      " Comments: Positive for tenderness at the low back area (paralumbar areas).   Skin:     General: Skin is warm.      Coloration: Skin is not pale.      Findings: No erythema or rash.   Neurological:      General: No focal deficit present.      Mental Status: He is alert and oriented to person, place, and time. Mental status is at baseline.   Psychiatric:         Behavior: Behavior normal.         Thought Content: Thought content normal.      Comments: Patient was anxious.             Lines/Drains:  Lines/Drains/Airways       Active Status       Name Placement date Placement time Site Days    Urethral Catheter Coude 18 Fr. 10/22/24  0101  Coude  1                  Urinary Catheter:  Goal for removal:  According to advanced practitioner for urologist, for voiding trial prior to discharge                 Lab Results: I have reviewed the following results:   Results from last 7 days   Lab Units 10/23/24  0613 10/22/24  0512 10/21/24  2312   WBC Thousand/uL 8.99   < > 12.99*   HEMOGLOBIN g/dL 10.3*   < > 11.9*   HEMATOCRIT % 33.2*   < > 37.1   PLATELETS Thousands/uL 251   < > 275   BANDS PCT %  --   --  2   SEGS PCT % 63   < >  --    LYMPHO PCT % 28   < > 4*   MONO PCT % 8   < > 0*   EOS PCT % 1   < > 0    < > = values in this interval not displayed.     Results from last 7 days   Lab Units 10/23/24  0613 10/22/24  0512 10/21/24  2312   SODIUM mmol/L 140   < > 135   POTASSIUM mmol/L 3.9   < > 3.8   CHLORIDE mmol/L 105   < > 99   CO2 mmol/L 28   < > 25   BUN mg/dL 10   < > 10   CREATININE mg/dL 1.07   < > 1.08   ANION GAP mmol/L 7   < > 11   CALCIUM mg/dL 9.0   < > 9.4   ALBUMIN g/dL  --   --  4.9   TOTAL BILIRUBIN mg/dL  --   --  0.49   ALK PHOS U/L  --   --  55   ALT U/L  --   --  54*   AST U/L  --   --  39   GLUCOSE RANDOM mg/dL 101   < > 157*    < > = values in this interval not displayed.         Results from last 7 days   Lab Units 10/22/24  1140   POC GLUCOSE mg/dl 111         Results from last 7 days   Lab Units  10/22/24  0512 10/21/24  2312   PROCALCITONIN ng/ml <0.05 <0.05       Recent Cultures (last 7 days):   Results from last 7 days   Lab Units 10/22/24  0101   URINE CULTURE  No Growth <1000 cfu/mL       Imaging Results Review: I reviewed radiology reports from this admission including: CT abdomen/pelvis.  Other Study Results Review: No additional pertinent studies reviewed.    Last 24 Hours Medication List:     Current Facility-Administered Medications:     acetaminophen (TYLENOL) tablet 650 mg, Q6H PRN    aluminum-magnesium hydroxide-simethicone (MAALOX) oral suspension 30 mL, Q8H PRN    enoxaparin (LOVENOX) subcutaneous injection 40 mg, Daily    hydrOXYzine HCL (ATARAX) tablet 25 mg, Q6H PRN    ketorolac (TORADOL) injection 15 mg, Q6H PRN    lactated ringers infusion, Continuous, Last Rate: 125 mL/hr (10/23/24 1056)    melatonin tablet 3 mg, HS    ondansetron (ZOFRAN) injection 4 mg, Q6H PRN    oxybutynin (DITROPAN) tablet 5 mg, TID    oxyCODONE (ROXICODONE) IR tablet 5 mg, Q6H PRN    oxymetazoline (AFRIN) 0.05 % nasal spray 1 spray, Q12H PRN    pantoprazole (PROTONIX) EC tablet 40 mg, Daily Before Breakfast    patient supplied medication, Daily    phenazopyridine (PYRIDIUM) tablet 100 mg, TID With Meals    sodium chloride (OCEAN) 0.65 % nasal spray 1 spray, Q1H PRN    tamsulosin (FLOMAX) capsule 0.4 mg, Daily With Dinner    Administrative Statements   Today, Patient Was Seen By: Rick Fontenot MD      **Please Note: This note may have been constructed using a voice recognition system.**

## 2024-10-23 NOTE — PLAN OF CARE
Problem: PAIN - ADULT  Goal: Verbalizes/displays adequate comfort level or baseline comfort level  Description: Interventions:  - Encourage patient to monitor pain and request assistance  - Assess pain using appropriate pain scale  - Administer analgesics based on type and severity of pain and evaluate response  - Implement non-pharmacological measures as appropriate and evaluate response  - Consider cultural and social influences on pain and pain management  - Notify physician/advanced practitioner if interventions unsuccessful or patient reports new pain  Outcome: Progressing     Problem: INFECTION - ADULT  Goal: Absence or prevention of progression during hospitalization  Description: INTERVENTIONS:  - Assess and monitor for signs and symptoms of infection  - Monitor lab/diagnostic results  - Monitor all insertion sites, i.e. indwelling lines, tubes, and drains  - Monitor endotracheal if appropriate and nasal secretions for changes in amount and color  - [place] appropriate cooling/warming therapies per order  - Administer medications as ordered  - Instruct and encourage patient and family to use good hand hygiene technique  - Identify and instruct in appropriate isolation precautions for identified infection/condition  Outcome: Progressing  Goal: Absence of fever/infection during neutropenic period  Description: INTERVENTIONS:  - Monitor WBC    Outcome: Progressing     Problem: SAFETY ADULT  Goal: Patient will remain free of falls  Description: INTERVENTIONS:  - Educate patient/family on patient safety including physical limitations  - Instruct patient to call for assistance with activity   - Consult OT/PT to assist with strengthening/mobility   - Keep Call bell within reach  - Keep bed low and locked with side rails adjusted as appropriate  - Keep care items and personal belongings within reach  - Initiate and maintain comfort rounds  - Make Fall Risk Sign visible to staff  - Offer Toileting every  Hours,  in advance of need  - Initiate/Maintain alarm  - Obtain necessary fall risk management equipment:   - Apply yellow socks and bracelet for high fall risk patients  - Consider moving patient to room near nurses station  Outcome: Progressing  Goal: Maintain or return to baseline ADL function  Description: INTERVENTIONS:  -  Assess patient's ability to carry out ADLs; assess patient's baseline for ADL function and identify physical deficits which impact ability to perform ADLs (bathing, care of mouth/teeth, toileting, grooming, dressing, etc.)  - Assess/evaluate cause of self-care deficits   - Assess range of motion  - Assess patient's mobility; develop plan if impaired  - Assess patient's need for assistive devices and provide as appropriate  - Encourage maximum independence but intervene and supervise when necessary  - Involve family in performance of ADLs  - Assess for home care needs following discharge   - Consider OT consult to assist with ADL evaluation and planning for discharge  - Provide patient education as appropriate  Outcome: Progressing  Goal: Maintains/Returns to pre admission functional level  Description: INTERVENTIONS:  - Perform AM-PAC 6 Click Basic Mobility/ Daily Activity assessment daily.  - Set and communicate daily mobility goal to care team and patient/family/caregiver.   - Collaborate with rehabilitation services on mobility goals if consulted  - Perform Range of Motion  times a day.  - Reposition patient every  hours.  - Dangle patient  times a day  - Stand patient  times a day  - Ambulate patient  times a day  - Out of bed to chair  times a day   - Out of bed for meals 3 times a day  - Out of bed for toileting  - Record patient progress and toleration of activity level   Outcome: Progressing     Problem: DISCHARGE PLANNING  Goal: Discharge to home or other facility with appropriate resources  Description: INTERVENTIONS:  - Identify barriers to discharge w/patient and caregiver  - Arrange for  needed discharge resources and transportation as appropriate  - Identify discharge learning needs (meds, wound care, etc.)  - Arrange for interpretive services to assist at discharge as needed  - Refer to Case Management Department for coordinating discharge planning if the patient needs post-hospital services based on physician/advanced practitioner order or complex needs related to functional status, cognitive ability, or social support system  Outcome: Progressing     Problem: Knowledge Deficit  Goal: Patient/family/caregiver demonstrates understanding of disease process, treatment plan, medications, and discharge instructions  Description: Complete learning assessment and assess knowledge base.  Interventions:  - Provide teaching at level of understanding  - Provide teaching via preferred learning methods  Outcome: Progressing

## 2024-10-23 NOTE — UTILIZATION REVIEW
Initial Clinical Review    Admission: Date/Time/Statement:   Admission Orders (From admission, onward)       Ordered        10/22/24 0209  Place in Observation  Once                          Orders Placed This Encounter   Procedures    Place in Observation     Standing Status:   Standing     Number of Occurrences:   1     Order Specific Question:   Level of Care     Answer:   Med Surg [16]     ED Arrival Information       Expected   -    Arrival   10/21/2024 22:41    Acuity   Urgent              Means of arrival   Ambulance    Escorted by   [facility]    Service   Hospitalist    Admission type   Emergency              Arrival complaint   ems abd pain             Chief Complaint   Patient presents with    Abdominal Pain     Pt comes via ems c/o right upper and lower abdominal pain. Unable to empty bladder fully, having some burning and frequency. Had a cystoscopy urethroscopy w stent placed today d/c w 6 kidney stones took 10mg of oxycodone and 1000mg tylenol @2100. +nausea         Initial Presentation: 44 y.o. male with PMHx of generalized seizure d/o, kidney stones who presents to ED with c/o severe right sided flank pain and urinary urgency. Pt was recently dx'd with staghorn renal calculi with subsequent stent placement and lithotripsy performed on 10/21/2024. Pt was discharged home after procedure and developed severe, waxing and waning right sided flank pain. She reports increased urgency to urinate without dysuria or gross hematuria. Pt was d/c'd home on PO Bactrim and reports taking 1 oxycodone around 21:30 without relief.   On presentation afebrile, /106. Abd tenderness in the suprapubic area. R CVA tenderness. Barboza in place draining pink tinged urine. WBC 12.99, Hgb 11.9, ALT 54, Glucose 157. Discussion with Urolgoy, no acute  surgical intervention needed at this time.   Admitted under observation to MS Unit -- with Renal colic on R with hydronephrosis s/p right ureteroscopy and laser  lithotripsy, now with stent colic, distended bladder and perinephric/periureteral fluid. Pain management. Antiemetics. Maintain paul cath. Continue Flomax and Oxybutynin. For outpatient TOV and stent removal. Monitor CBC with diff, BMP. I/Os. Protonix. Continue pta Brivaracetam.     Anticipated Length of Stay/Certification Statement: Patient will be admitted on an observation basis with an anticipated length of stay of less than 2 midnights secondary to renal colic, IV antibiotics for UTI.     Urology consult -- S/p right ureteroscopy and laser lithotripsy, now with stent colic, distended bladder, and perinephric/periureteral fluid  Plan: Pain control for stent colic. Maintain Paul for now to decompress urinary system. F/u Ucx results. Plan for outpatient TOV and stent removal     Date: 10/23   Day 2: pt reports he would like CT scan and paul removed before going home. Urology discussed there is little clinical benefit in repeating the CT scan as he had 1 on admission.  As for Paul catheter this was placed on admission over 200 mL of return.  CT scan did show his bladder was distended. Urology discussed benefit of leaving Paul catheter in for a longer duration, however pt would like to not be d/c'd with a Paul catheter.  Discussed keeping catheter in place until tomorrow morning and with a trial of void.  He does understand if he is not able to urinate he will need catheter replaced and to be discharged with the catheter.        ED Treatment-Medication Administration from 10/21/2024 2241 to 10/22/2024 0710         Date/Time Order Dose Route Action     10/21/2024 2311 ondansetron (ZOFRAN) injection 4 mg 4 mg Intravenous Given     10/21/2024 2309 sodium chloride 0.9 % bolus 1,000 mL 1,000 mL Intravenous New Bag     10/21/2024 2309 HYDROmorphone (DILAUDID) injection 0.5 mg 0.5 mg Intravenous Given     10/21/2024 2310 ketorolac (TORADOL) injection 15 mg 15 mg Intravenous Given     10/22/2024 0040 lidocaine  (URO-JET) 2 % urethral/mucosal gel 1 Application 1 Application Urethral Given     10/22/2024 0030 HYDROmorphone (DILAUDID) injection 0.5 mg 0.5 mg Intravenous Given     10/22/2024 0152 ceftriaxone (ROCEPHIN) 1 g/50 mL in dextrose IVPB 1,000 mg Intravenous New Bag     10/22/2024 0332 acetaminophen (TYLENOL) tablet 650 mg 650 mg Oral Given     10/22/2024 0510 ketorolac (TORADOL) injection 15 mg 15 mg Intravenous Given     10/22/2024 0339 lactated ringers infusion 125 mL/hr Intravenous New Bag     10/22/2024 0332 melatonin tablet 3 mg 3 mg Oral Given       Scheduled Medications:  enoxaparin, 40 mg, Subcutaneous, Daily  melatonin, 3 mg, Oral, HS  oxybutynin, 5 mg, Oral, TID  pantoprazole, 40 mg, Oral, Daily Before Breakfast  patient supplied medication, 1 each, Oral, Daily  phenazopyridine, 100 mg, Oral, TID With Meals  tamsulosin, 0.4 mg, Oral, Daily With Dinner    Continuous IV Infusions:  lactated ringers, 125 mL/hr, Intravenous, Continuous    PRN Meds:  acetaminophen, 650 mg, Oral, Q6H PRN  aluminum-magnesium hydroxide-simethicone, 30 mL, Oral, Q8H PRN  hydrOXYzine HCL, 25 mg, Oral, Q6H PRN  ketorolac, 15 mg, Intravenous, Q6H PRN  ondansetron, 4 mg, Intravenous, Q6H PRN  oxyCODONE, 5 mg, Oral, Q6H PRN  oxymetazoline, 1 spray, Each Nare, Q12H PRN  sodium chloride, 1 spray, Each Nare, Q1H PRN      ED Triage Vitals [10/21/24 2251]   Temperature Pulse Respirations Blood Pressure SpO2 Pain Score   98.2 °F (36.8 °C) 74 16 (!) 165/106 98 % 10 - Worst Possible Pain     Weight (last 2 days)       None            Vital Signs (last 3 days)       Date/Time Temp Pulse Resp BP MAP (mmHg) SpO2 O2 Device Patient Position - Orthostatic VS [place] Coma Scale Score Pain    10/23/24 16:02:15 98.2 °F (36.8 °C) 75 16 126/89 101 99 % -- -- -- --    10/23/24 13:32:50 99.6 °F (37.6 °C) 99 -- 138/89 105 96 % -- -- -- --    10/23/24 13:32:43 99.6 °F (37.6 °C) 99 -- 138/89 105 96 % -- -- -- --    10/23/24 0800 -- -- -- -- -- -- -- -- 15 4     10/23/24 0700 98.9 °F (37.2 °C) 80 18 132/80 101 97 % None (Room air) Lying -- --    10/22/24 2357 -- -- -- -- -- -- -- -- -- 8    10/22/24 2215 98.4 °F (36.9 °C) 77 18 139/66 94 99 % None (Room air) Lying -- --    10/22/24 1930 -- -- -- -- -- -- -- -- 15 4    10/22/24 1517 98.5 °F (36.9 °C) 79 18 116/64 85 99 % None (Room air) Lying -- --    10/22/24 0845 -- -- -- -- -- -- -- -- 15 8    10/22/24 0723 97.5 °F (36.4 °C) 81 18 116/63 -- 99 % None (Room air) Lying -- --    10/22/24 0600 -- 74 18 93/52 68 95 % None (Room air) Lying -- --    10/22/24 0510 -- -- -- -- -- -- -- -- -- 8    10/22/24 0500 -- 77 16 105/50 72 96 % None (Room air) -- -- --    10/22/24 0407 -- -- -- -- -- -- -- -- 15 --    10/22/24 0332 -- -- -- -- -- -- -- -- -- 4    10/22/24 0327 -- -- -- -- -- -- -- -- -- 7    10/22/24 0300 -- 72 16 105/57 73 96 % None (Room air) Lying -- --    10/22/24 0100 -- 94 16 133/89 106 99 % None (Room air) Lying -- 7    10/22/24 0030 -- -- -- -- -- -- -- -- -- 10 - Worst Possible Pain    10/21/24 2308 -- 78 -- 151/91 115 98 % -- -- -- --    10/21/24 2251 98.2 °F (36.8 °C) 74 16 165/106 130 98 % None (Room air) Lying -- 10 - Worst Possible Pain              Pertinent Labs/Diagnostic Test Results:   Radiology:  CT abdomen pelvis with contrast   Final Interpretation by Jhonatan No MD (10/22 0112)         1. Moderate right hydronephrosis. Ureteral stent in expected position. Significant perinephric and periureteral fluid and calculi medial to the right ureteropelvic junction suggesting calyceal or ureteral perforation. Recommend urology consultation.   2. Stone fragments within the renal pelvis and proximal ureter. Nonobstructing calculi measuring up to 6.6 mm.        Cardiology:  ECG 12 lead   Final Result by Ruben Potter MD (10/22 5386)   Sinus tachycardia   Nonspecific ST and T wave abnormality   Abnormal ECG   When compared with ECG of 20-SEP-2024 21:10,   ST now depressed in Anterior leads   Confirmed by  Ruben Potter ([id number]) on 10/22/2024 9:29:23 AM            Results from last 7 days   Lab Units 10/23/24  0613 10/22/24  0512 10/21/24  2312   WBC Thousand/uL 8.99 13.36* 12.99*   HEMOGLOBIN g/dL 10.3* 10.2* 11.9*   HEMATOCRIT % 33.2* 32.4* 37.1   PLATELETS Thousands/uL 251 257 275   TOTAL NEUT ABS Thousands/µL 5.64 11.75*  --    BANDS PCT %  --   --  2     Results from last 7 days   Lab Units 10/23/24  0613 10/22/24  0512 10/21/24  2312   SODIUM mmol/L 140 134* 135   POTASSIUM mmol/L 3.9 3.9 3.8   CHLORIDE mmol/L 105 101 99   CO2 mmol/L 28 26 25   ANION GAP mmol/L 7 7 11   BUN mg/dL 10 10 10   CREATININE mg/dL 1.07 1.04 1.08   EGFR ml/min/1.73sq m 85 88 84   CALCIUM mg/dL 9.0 8.9 9.4     Results from last 7 days   Lab Units 10/21/24  2312   AST U/L 39   ALT U/L 54*   ALK PHOS U/L 55   TOTAL PROTEIN g/dL 7.8   ALBUMIN g/dL 4.9   TOTAL BILIRUBIN mg/dL 0.49     Results from last 7 days   Lab Units 10/22/24  1140   POC GLUCOSE mg/dl 111     Results from last 7 days   Lab Units 10/23/24  0613 10/22/24  0512 10/21/24  2312   GLUCOSE RANDOM mg/dL 101 156* 157*     Results from last 7 days   Lab Units 10/22/24  0512 10/21/24  2312   PROCALCITONIN ng/ml <0.05 <0.05       Results from last 7 days   Lab Units 10/21/24  2312   LIPASE u/L 20       Results from last 7 days   Lab Units 10/22/24  0101   CLARITY UA  Turbid   COLOR UA  Brown   SPEC GRAV UA  1.012   PH UA  7.0   GLUCOSE UA mg/dl Negative   KETONES UA mg/dl Negative   BLOOD UA  Large*   PROTEIN UA mg/dl 50 (1+)*   NITRITE UA  Negative   BILIRUBIN UA  Negative   UROBILINOGEN UA (BE) mg/dl <2.0   LEUKOCYTES UA  Small*   WBC UA /hpf 2-4*   RBC UA /hpf Innumerable*   BACTERIA UA /hpf Occasional   EPITHELIAL CELLS WET PREP /hpf None Seen       Results from last 7 days   Lab Units 10/22/24  0101   URINE CULTURE  No Growth <1000 cfu/mL       Past Medical History:   Diagnosis Date    Generalized seizure disorder (HCC)     Hip strain, left, subsequent encounter     Kidney  stone     Seizures (HCC)     Shoulder strain, left, subsequent encounter      Present on Admission:   Generalized seizure disorder (HCC)   (Resolved) Leukocytosis   Renal colic on right side with hydronephrosis   Anemia      Admitting Diagnosis: Renal colic [N23]  Urolithiasis [N20.9]  Renal colic on right side [N23]  Acute urinary retention [R33.8]  Unspecified abdominal pain [R10.9]  Age/Sex: 44 y.o. male    Network Utilization Review Department  ATTENTION: Please call with any questions or concerns to [phone] and carefully listen to the prompts so that you are directed to the right person. All voicemails are confidential.   For Discharge needs, contact Care Management DC Support Team at [phone] opt. 2  Send all requests for admission clinical reviews, approved or denied determinations and any other requests to dedicated fax number below belonging to the campus where the patient is receiving treatment. List of dedicated fax numbers for the Facilities:  FACILITY NAME UR FAX NUMBER   ADMISSION DENIALS (Administrative/Medical Necessity) [phone]   DISCHARGE SUPPORT TEAM (NETWORK) [phone]   PARENT CHILD HEALTH (Maternity/NICU/Pediatrics) [phone]   [facility] [phone]   [facility] [phone]   [facility] [phone]   [facility] [phone]   [facility] [phone]   [facility] [phone]   [facility] [phone]   [facility] [phone]   [facility] [phone]   [facility] [phone]   [facility] [phone]   [facility] [phone]

## 2024-10-23 NOTE — PROGRESS NOTES
Progress Note - Urology   Name: Marjorie Castro 44 y.o. male I MRN: [id number]  Unit/Bed#: W -01 I Date of Admission: 10/21/2024   Date of Service: 10/23/2024 I Hospital Day: 0     Assessment & Plan  Renal colic on right side with hydronephrosis  -POD2 cystoscopy, ureteroscopy, holmium laser lithotripsy, retrograde pyelogram, insertion of R ureteral stent  -VSS, afebrile  -CT showing moderate R hydro, urinary bladder distended. Ureteral stent in good position. Perinephric and periureteral fluid suggesting calcyceal or ureteral perforation.   -Pain has improved since admission   -Oxybutinin, flomax, pyridium for stent discomfort  -patient requesting repeat CT scan and paul removal prior to discharge  -After discussion, discussed little benefit to repeating imaging right now, patient in agreement. Ok with repeat in 1 week with Dr. Gates to review  -Will plan TOV tomorrow and discharge  UTI (urinary tract infection)  Urine no growth  Abx discontinued          Subjective:   HPI: seen at bedside. Reports he would like CT scan and paul removed before going home.  We discussed there is little clinical benefit in repeating the CT scan as he had 1 on admission.  As for Paul catheter this was placed on admission over 200 mL of return.  His CT scan did show his bladder was distended.  I discussed benefit of leaving Paul catheter in for a longer duration, however patient would like to not be discharged with a Paul catheter.  We discussed keeping catheter in place until tomorrow morning and with a trial of void.  He does understand if he is not able to urinate he will need catheter replaced and to be discharged with the catheter    Review of Systems   Constitutional:  Negative for chills and fever.   Respiratory:  Negative for cough and shortness of breath.    Cardiovascular:  Negative for chest pain and palpitations.   Gastrointestinal:  Negative for abdominal pain and vomiting.   Genitourinary:  Negative for  dysuria and hematuria.   Musculoskeletal:  Negative for arthralgias and back pain.   Skin:  Negative for color change and rash.   Neurological:  Negative for seizures and syncope.   All other systems reviewed and are negative.      Objective:    Vitals: Blood pressure 138/89, pulse 99, temperature 99.6 °F (37.6 °C), resp. rate 18, SpO2 96%.,There is no height or weight on file to calculate BMI.    Physical Exam  Constitutional:       General: He is not in acute distress.     Appearance: Normal appearance. He is normal weight. He is not ill-appearing or toxic-appearing.   HENT:      Head: Normocephalic and atraumatic.      Right Ear: External ear normal.      Left Ear: External ear normal.      Nose: Nose normal.      Mouth/Throat:      Mouth: Mucous membranes are moist.   Eyes:      General: No scleral icterus.     Conjunctiva/sclera: Conjunctivae normal.   Cardiovascular:      Rate and Rhythm: Normal rate.      Pulses: Normal pulses.   Pulmonary:      Effort: Pulmonary effort is normal.   Neurological:      General: No focal deficit present.      Mental Status: He is alert and oriented to person, place, and time. Mental status is at baseline.   Psychiatric:         Mood and Affect: Mood normal.         Behavior: Behavior normal.         Thought Content: Thought content normal.         Judgment: Judgment normal.       Labs:  Recent Labs     10/21/24  2312 10/22/24  0512 10/23/24  0613   WBC 12.99* 13.36* 8.99       Recent Labs     10/21/24  2312 10/22/24  0512 10/23/24  0613   HGB 11.9* 10.2* 10.3*     Recent Labs     10/21/24  2312 10/22/24  0512 10/23/24  0613   HCT 37.1 32.4* 33.2*     Recent Labs     10/21/24  2312 10/22/24  0512 10/23/24  0613   CREATININE 1.08 1.04 1.07         History:    Past Medical History:   Diagnosis Date    Generalized seizure disorder (HCC)     Hip strain, left, subsequent encounter     Kidney stone     Seizures (HCC)     Shoulder strain, left, subsequent encounter      Social History      Socioeconomic History    Marital status: /Civil Union     Spouse name: None    Number of children: None    Years of education: None    Highest education level: None   Occupational History    None   Tobacco Use    Smoking status: Never    Smokeless tobacco: Never    Tobacco comments:     Occasional cigar and cigg when drinks   Vaping Use    Vaping status: Never Used   Substance and Sexual Activity    Alcohol use: Yes     Comment: 2-3 drinks on weekend    Drug use: No    Sexual activity: None   Other Topics Concern    None   Social History Narrative    None     Social Determinants of Health     Financial Resource Strain: Low Risk  (8/23/2023)    Received from [facility]    Overall Financial Resource Strain (CARDIA)     Difficulty of Paying Living Expenses: Not very hard   Food Insecurity: No Food Insecurity (8/23/2023)    Received from [facility]    Hunger Vital Sign     Worried About Running Out of Food in the Last Year: Never true     Ran Out of Food in the Last Year: Never true   Transportation Needs: No Transportation Needs (8/23/2023)    Received from [facility]    PRAPARE - Transportation     Lack of Transportation (Medical): No     Lack of Transportation (Non-Medical): No   Physical Activity: Not on file   Stress: Not on file   Social Connections: Unknown (6/18/2024)    Received from [facility]     How often do you feel lonely or isolated from those around you? (Adult - for ages 18 years and over): Not on file   Intimate Partner Violence: Unknown (8/23/2023)    Received from [facility]    Humiliation, Afraid, Rape, and Kick questionnaire     Fear of Current or Ex-Partner: No     Emotionally Abused: No     Physically Abused: No     Sexually Abused: Not on file   Housing Stability: Not on file     Past Surgical History:   Procedure Laterality Date    FL RETROGRADE PYELOGRAM  10/21/2024    LITHOTRIPSY      OR  CYSTO/URETERO W/LITHOTRIPSY &INDWELL STENT INSRT Right 10/21/2024    Procedure: CYSTOSCOPY URETEROSCOPY WITH LITHOTRIPSY HOLMIUM LASER, RETROGRADE PYELOGRAM AND INSERTION STENT URETERAL;  Surgeon: Ritchie Gates DO;  [facility] MAIN OR;  Service: Urology     Family History   Problem Relation Age of Onset    No Known Problems Mother     Hypertension Father     Diabetes Father     No Known Problems Brother        Daria Davidson PA-C  Date: 10/23/2024 Time: 3:26 PM

## 2024-10-23 NOTE — PLAN OF CARE
Problem: PAIN - ADULT  Goal: Verbalizes/displays adequate comfort level or baseline comfort level  Description: Interventions:  - Encourage patient to monitor pain and request assistance  - Assess pain using appropriate pain scale  - Administer analgesics based on type and severity of pain and evaluate response  - Implement non-pharmacological measures as appropriate and evaluate response  - Consider cultural and social influences on pain and pain management  - Notify physician/advanced practitioner if interventions unsuccessful or patient reports new pain  Outcome: Progressing     Problem: INFECTION - ADULT  Goal: Absence or prevention of progression during hospitalization  Description: INTERVENTIONS:  - Assess and monitor for signs and symptoms of infection  - Monitor lab/diagnostic results  - Monitor all insertion sites, i.e. indwelling lines, tubes, and drains  - Monitor endotracheal if appropriate and nasal secretions for changes in amount and color  - [place] appropriate cooling/warming therapies per order  - Administer medications as ordered  - Instruct and encourage patient and family to use good hand hygiene technique  - Identify and instruct in appropriate isolation precautions for identified infection/condition  Outcome: Progressing  Goal: Absence of fever/infection during neutropenic period  Description: INTERVENTIONS:  - Monitor WBC    Outcome: Progressing     Problem: SAFETY ADULT  Goal: Patient will remain free of falls  Description: INTERVENTIONS:  - Educate patient/family on patient safety including physical limitations  - Instruct patient to call for assistance with activity   - Consult OT/PT to assist with strengthening/mobility   - Keep Call bell within reach  - Keep bed low and locked with side rails adjusted as appropriate  - Keep care items and personal belongings within reach  - Initiate and maintain comfort rounds  - Make Fall Risk Sign visible to staff  - Offer Toileting every  Hours,  in advance of need  - Initiate/Maintain alarm  - Obtain necessary fall risk management equipment:   - Apply yellow socks and bracelet for high fall risk patients  - Consider moving patient to room near nurses station  Outcome: Progressing  Goal: Maintain or return to baseline ADL function  Description: INTERVENTIONS:  -  Assess patient's ability to carry out ADLs; assess patient's baseline for ADL function and identify physical deficits which impact ability to perform ADLs (bathing, care of mouth/teeth, toileting, grooming, dressing, etc.)  - Assess/evaluate cause of self-care deficits   - Assess range of motion  - Assess patient's mobility; develop plan if impaired  - Assess patient's need for assistive devices and provide as appropriate  - Encourage maximum independence but intervene and supervise when necessary  - Involve family in performance of ADLs  - Assess for home care needs following discharge   - Consider OT consult to assist with ADL evaluation and planning for discharge  - Provide patient education as appropriate  Outcome: Progressing  Goal: Maintains/Returns to pre admission functional level  Description: INTERVENTIONS:  - Perform AM-PAC 6 Click Basic Mobility/ Daily Activity assessment daily.  - Set and communicate daily mobility goal to care team and patient/family/caregiver.   - Collaborate with rehabilitation services on mobility goals if consulted  - Perform Range of Motion  times a day.  - Reposition patient every  hours.  - Dangle patient  times a day  - Stand patient  times a day  - Ambulate patient  times a day  - Out of bed to chair  times a day   - Out of bed for meals times a day  - Out of bed for toileting  - Record patient progress and toleration of activity level   Outcome: Progressing     Problem: DISCHARGE PLANNING  Goal: Discharge to home or other facility with appropriate resources  Description: INTERVENTIONS:  - Identify barriers to discharge w/patient and caregiver  - Arrange for  needed discharge resources and transportation as appropriate  - Identify discharge learning needs (meds, wound care, etc.)  - Arrange for interpretive services to assist at discharge as needed  - Refer to Case Management Department for coordinating discharge planning if the patient needs post-hospital services based on physician/advanced practitioner order or complex needs related to functional status, cognitive ability, or social support system  Outcome: Progressing     Problem: Knowledge Deficit  Goal: Patient/family/caregiver demonstrates understanding of disease process, treatment plan, medications, and discharge instructions  Description: Complete learning assessment and assess knowledge base.  Interventions:  - Provide teaching at level of understanding  - Provide teaching via preferred learning methods  Outcome: Progressing

## 2024-10-23 NOTE — ASSESSMENT & PLAN NOTE
-POD2 cystoscopy, ureteroscopy, holmium laser lithotripsy, retrograde pyelogram, insertion of R ureteral stent  -VSS, afebrile  -CT showing moderate R hydro, urinary bladder distended. Ureteral stent in good position. Perinephric and periureteral fluid suggesting calcyceal or ureteral perforation.   -Pain has improved since admission   -Oxybutinin, flomax, pyridium for stent discomfort  -patient requesting repeat CT scan and paul removal prior to discharge  -After discussion, discussed little benefit to repeating imaging right now, patient in agreement. Ok with repeat in 1 week with Dr. Gates to review  -Will plan TOV tomorrow and discharge

## 2024-10-23 NOTE — ASSESSMENT & PLAN NOTE
Patient complains of urinary urgency without dysuria or gross hematuria.   Urine micro: Innumerable RBCs, WBC 2-4, occasional bacteria.  Urine culture: No growth.  Received IV Rocephin in ED, was continued with IV Cefepime due to recent instrumentation.  However, with urine culture revealing no growth, antibiotic was discontinued.

## 2024-10-24 VITALS
RESPIRATION RATE: 18 BRPM | HEART RATE: 81 BPM | OXYGEN SATURATION: 96 % | SYSTOLIC BLOOD PRESSURE: 131 MMHG | TEMPERATURE: 97.9 F | DIASTOLIC BLOOD PRESSURE: 89 MMHG

## 2024-10-24 PROBLEM — R04.0 EPISTAXIS: Status: RESOLVED | Noted: 2024-10-23 | Resolved: 2024-10-24

## 2024-10-24 LAB
ANION GAP SERPL CALCULATED.3IONS-SCNC: 10 MMOL/L (ref 4–13)
BUN SERPL-MCNC: 10 MG/DL (ref 5–25)
CALCIUM SERPL-MCNC: 8.9 MG/DL (ref 8.4–10.2)
CHLORIDE SERPL-SCNC: 101 MMOL/L (ref 96–108)
CO2 SERPL-SCNC: 25 MMOL/L (ref 21–32)
CREAT SERPL-MCNC: 0.97 MG/DL (ref 0.6–1.3)
ERYTHROCYTE [DISTWIDTH] IN BLOOD BY AUTOMATED COUNT: 12.9 % (ref 11.6–15.1)
GFR SERPL CREATININE-BSD FRML MDRD: 95 ML/MIN/1.73SQ M
GLUCOSE SERPL-MCNC: 103 MG/DL (ref 65–140)
HCT VFR BLD AUTO: 33.8 % (ref 36.5–49.3)
HGB BLD-MCNC: 10.6 G/DL (ref 12–17)
MCH RBC QN AUTO: 28.1 PG (ref 26.8–34.3)
MCHC RBC AUTO-ENTMCNC: 31.4 G/DL (ref 31.4–37.4)
MCV RBC AUTO: 90 FL (ref 82–98)
PLATELET # BLD AUTO: 270 THOUSANDS/UL (ref 149–390)
PMV BLD AUTO: 10.6 FL (ref 8.9–12.7)
POTASSIUM SERPL-SCNC: 3.7 MMOL/L (ref 3.5–5.3)
RBC # BLD AUTO: 3.77 MILLION/UL (ref 3.88–5.62)
SODIUM SERPL-SCNC: 136 MMOL/L (ref 135–147)
WBC # BLD AUTO: 8.41 THOUSAND/UL (ref 4.31–10.16)

## 2024-10-24 PROCEDURE — 99239 HOSP IP/OBS DSCHRG MGMT >30: CPT | Performed by: INTERNAL MEDICINE

## 2024-10-24 PROCEDURE — 80048 BASIC METABOLIC PNL TOTAL CA: CPT | Performed by: INTERNAL MEDICINE

## 2024-10-24 PROCEDURE — 85027 COMPLETE CBC AUTOMATED: CPT | Performed by: INTERNAL MEDICINE

## 2024-10-24 PROCEDURE — 99232 SBSQ HOSP IP/OBS MODERATE 35: CPT

## 2024-10-24 RX ORDER — OXYCODONE HYDROCHLORIDE 5 MG/1
5 TABLET ORAL EVERY 6 HOURS PRN
Qty: 5 TABLET | Refills: 0 | Status: SHIPPED | OUTPATIENT
Start: 2024-10-24

## 2024-10-24 RX ORDER — TAMSULOSIN HYDROCHLORIDE 0.4 MG/1
0.4 CAPSULE ORAL
Qty: 30 CAPSULE | Refills: 0 | Status: SHIPPED | OUTPATIENT
Start: 2024-10-24 | End: 2024-11-23

## 2024-10-24 RX ORDER — OXYBUTYNIN CHLORIDE 5 MG/1
5 TABLET, EXTENDED RELEASE ORAL DAILY PRN
Qty: 30 TABLET | Refills: 0 | Status: SHIPPED | OUTPATIENT
Start: 2024-10-24 | End: 2024-11-23

## 2024-10-24 RX ORDER — ACETAMINOPHEN 325 MG/1
650 TABLET ORAL EVERY 6 HOURS PRN
Start: 2024-10-24

## 2024-10-24 RX ORDER — MAGNESIUM HYDROXIDE/ALUMINUM HYDROXICE/SIMETHICONE 120; 1200; 1200 MG/30ML; MG/30ML; MG/30ML
30 SUSPENSION ORAL EVERY 8 HOURS PRN
Start: 2024-10-24

## 2024-10-24 RX ORDER — PANTOPRAZOLE SODIUM 40 MG/1
40 TABLET, DELAYED RELEASE ORAL
Qty: 30 TABLET | Refills: 0 | Status: SHIPPED | OUTPATIENT
Start: 2024-10-25

## 2024-10-24 RX ADMIN — PHENAZOPYRIDINE 100 MG: 100 TABLET ORAL at 12:19

## 2024-10-24 RX ADMIN — ENOXAPARIN SODIUM 40 MG: 40 INJECTION SUBCUTANEOUS at 09:15

## 2024-10-24 RX ADMIN — OXYBUTYNIN CHLORIDE 5 MG: 5 TABLET ORAL at 09:15

## 2024-10-24 RX ADMIN — PHENAZOPYRIDINE 100 MG: 100 TABLET ORAL at 09:15

## 2024-10-24 NOTE — ASSESSMENT & PLAN NOTE
"Resolved.  Presentation: Patient presented with complaints of severe right sided flank pain and urinary urgency. Patient was recently diagnosed with staghorn renal calculi with subsequent stent placement and lithotripsy performed on 10/21/2024. Patient was discharged home after procedure and developed severe, waxing and waning right sided flank pain. Patient reports increased urgency to urinate without dysuria or gross hematuria. Patient was discharged home on PO Bactrim. Patient reports taking 1 oxycodone around 21:30 without relief; prompting him to present to the ED for further evaluation. Patient denies fever, chills, nausea, vomiting, chest pain or shortness of breath.  CT: \" Moderate right hydronephrosis. Ureteral stent in expected position. Significant perinephric and periureteral fluid and calculi medial to the right ureteropelvic junction suggesting calyceal or ureteral perforation. Stone fragments within the renal pelvis and proximal ureter. Nonobstructing calculi measuring up to 6.6 mm.\"  Urologist on board.  Urine micro: Innumerable RBCs, WBC 2-4, occasional bacteria.  Barboza catheter placed in ED.  Status post strict I&Os.  Status post IV Hydration.  Status post pain control.  Continue Flomax, Oxybutynin and oxycodone was prescribed by the urology advanced practitioner on discharge.  Today, patient's Barboza catheter was removed and underwent voiding trial and he was able to void spontaneously several times.  Urologist was okay with the discharge of the patient.  Patient will follow-up with them in the office.  Patient will have CT scan of the abdomen and pelvis in the outpatient.  Urologist will take care of this.  "

## 2024-10-24 NOTE — PROGRESS NOTES
Progress Note - Urology   Name: Marjorie Castro 44 y.o. male I MRN: [id number]  Unit/Bed#: W -01 I Date of Admission: 10/21/2024   Date of Service: 10/24/2024 I Hospital Day: 0     Assessment & Plan  Renal colic on right side with hydronephrosis  -POD3 cystoscopy, ureteroscopy, holmium laser lithotripsy, retrograde pyelogram, insertion of R ureteral stent  -VSS, afebrile  -CT showing moderate R hydro, urinary bladder distended. Ureteral stent in good position. Perinephric and periureteral fluid suggesting calcyceal or ureteral perforation.   -Pain resolved  -Barboza removed- urinating well. One small blood clot and urine pyridium tinged.  Abnormal urinalysis  Urine no growth  Abx discontinued          Subjective:   HPI: seen at bedside. Ready for discharge. Pain resolved. Urinating no issues- one small clot this AM. Since then pyridium tinged orange    Review of Systems   Constitutional:  Negative for chills and fever.   Respiratory:  Negative for cough and shortness of breath.    Cardiovascular:  Negative for chest pain and palpitations.   Gastrointestinal:  Negative for abdominal pain and vomiting.   Genitourinary:  Negative for dysuria and hematuria.   Musculoskeletal:  Negative for arthralgias and back pain.   Skin:  Negative for color change and rash.   Neurological:  Negative for seizures and syncope.   All other systems reviewed and are negative.      Objective:    Vitals: Blood pressure 131/89, pulse 81, temperature 97.9 °F (36.6 °C), resp. rate 18, SpO2 96%.,There is no height or weight on file to calculate BMI.    Physical Exam  Constitutional:       General: He is not in acute distress.     Appearance: Normal appearance. He is normal weight. He is not ill-appearing or toxic-appearing.   HENT:      Head: Normocephalic and atraumatic.      Right Ear: External ear normal.      Left Ear: External ear normal.      Nose: Nose normal.      Mouth/Throat:      Mouth: Mucous membranes are moist.   Eyes:       General: No scleral icterus.     Conjunctiva/sclera: Conjunctivae normal.   Cardiovascular:      Rate and Rhythm: Normal rate.      Pulses: Normal pulses.   Pulmonary:      Effort: Pulmonary effort is normal.   Musculoskeletal:         General: Normal range of motion.      Cervical back: Normal range of motion.   Skin:     General: Skin is warm.      Findings: No rash.   Neurological:      General: No focal deficit present.      Mental Status: He is alert and oriented to person, place, and time. Mental status is at baseline.   Psychiatric:         Mood and Affect: Mood normal.         Behavior: Behavior normal.         Thought Content: Thought content normal.         Judgment: Judgment normal.             Labs:  Recent Labs     10/21/24  2312 10/22/24  0512 10/23/24  0613 10/24/24  0439   WBC 12.99* 13.36* 8.99 8.41       Recent Labs     10/21/24  2312 10/22/24  0512 10/23/24  0613 10/24/24  0439   HGB 11.9* 10.2* 10.3* 10.6*     Recent Labs     10/21/24  2312 10/22/24  0512 10/23/24  0613 10/24/24  0439   HCT 37.1 32.4* 33.2* 33.8*     Recent Labs     10/21/24  2312 10/22/24  0512 10/23/24  0613 10/24/24  0439   CREATININE 1.08 1.04 1.07 0.97         History:    Past Medical History:   Diagnosis Date    Generalized seizure disorder (HCC)     Hip strain, left, subsequent encounter     Kidney stone     Seizures (HCC)     Shoulder strain, left, subsequent encounter      Social History     Socioeconomic History    Marital status: /Civil Union     Spouse name: None    Number of children: None    Years of education: None    Highest education level: None   Occupational History    None   Tobacco Use    Smoking status: Never    Smokeless tobacco: Never    Tobacco comments:     Occasional cigar and cigg when drinks   Vaping Use    Vaping status: Never Used   Substance and Sexual Activity    Alcohol use: Yes     Comment: 2-3 drinks on weekend    Drug use: No    Sexual activity: None   Other Topics Concern    None    Social History Narrative    None     Social Determinants of Health     Financial Resource Strain: Low Risk  (8/23/2023)    Received from [facility]    Overall Financial Resource Strain (CARDIA)     Difficulty of Paying Living Expenses: Not very hard   Food Insecurity: No Food Insecurity (8/23/2023)    Received from [facility]    Hunger Vital Sign     Worried About Running Out of Food in the Last Year: Never true     Ran Out of Food in the Last Year: Never true   Transportation Needs: No Transportation Needs (8/23/2023)    Received from [facility]    PRAPARE - Transportation     Lack of Transportation (Medical): No     Lack of Transportation (Non-Medical): No   Physical Activity: Not on file   Stress: Not on file   Social Connections: Unknown (6/18/2024)    Received from [facility]     How often do you feel lonely or isolated from those around you? (Adult - for ages 18 years and over): Not on file   Intimate Partner Violence: Unknown (8/23/2023)    Received from [facility]    Humiliation, Afraid, Rape, and Kick questionnaire     Fear of Current or Ex-Partner: No     Emotionally Abused: No     Physically Abused: No     Sexually Abused: Not on file   Housing Stability: Not on file     Past Surgical History:   Procedure Laterality Date    FL RETROGRADE PYELOGRAM  10/21/2024    LITHOTRIPSY      NM CYSTO/URETERO W/LITHOTRIPSY &INDWELL STENT INSRT Right 10/21/2024    Procedure: CYSTOSCOPY URETEROSCOPY WITH LITHOTRIPSY HOLMIUM LASER, RETROGRADE PYELOGRAM AND INSERTION STENT URETERAL;  Surgeon: Ritchie Gates DO;  Location: [facility] MAIN OR;  Service: Urology     Family History   Problem Relation Age of Onset    No Known Problems Mother     Hypertension Father     Diabetes Father     No Known Problems Brother        Daria Davidson PA-C  Date: 10/24/2024 Time: 11:50 AM

## 2024-10-24 NOTE — ASSESSMENT & PLAN NOTE
Stable and improving spontaneously.  Normocytic anemia with normal RDW.  No noted or reported bleeding.  Possibility of hemodilution from IV fluids.  Monitor in the outpatient.  PCP may facilitate this.  Outpatient follow-up with primary care physician.

## 2024-10-24 NOTE — PLAN OF CARE
Problem: PAIN - ADULT  Goal: Verbalizes/displays adequate comfort level or baseline comfort level  Description: Interventions:  - Encourage patient to monitor pain and request assistance  - Assess pain using appropriate pain scale  - Administer analgesics based on type and severity of pain and evaluate response  - Implement non-pharmacological measures as appropriate and evaluate response  - Consider cultural and social influences on pain and pain management  - Notify physician/advanced practitioner if interventions unsuccessful or patient reports new pain  Outcome: Progressing     Problem: INFECTION - ADULT  Goal: Absence or prevention of progression during hospitalization  Description: INTERVENTIONS:  - Assess and monitor for signs and symptoms of infection  - Monitor lab/diagnostic results  - Monitor all insertion sites, i.e. indwelling lines, tubes, and drains  - Monitor endotracheal if appropriate and nasal secretions for changes in amount and color  - [place] appropriate cooling/warming therapies per order  - Administer medications as ordered  - Instruct and encourage patient and family to use good hand hygiene technique  - Identify and instruct in appropriate isolation precautions for identified infection/condition  Outcome: Progressing  Goal: Absence of fever/infection during neutropenic period  Description: INTERVENTIONS:  - Monitor WBC    Outcome: Progressing     Problem: SAFETY ADULT  Goal: Patient will remain free of falls  Description: INTERVENTIONS:  - Educate patient/family on patient safety including physical limitations  - Instruct patient to call for assistance with activity   - Consult OT/PT to assist with strengthening/mobility   - Keep Call bell within reach  - Keep bed low and locked with side rails adjusted as appropriate  - Keep care items and personal belongings within reach  - Initiate and maintain comfort rounds  - Make Fall Risk Sign visible to staff  - Offer Toileting every  Hours,  in advance of need  - Initiate/Maintain alarm  - Obtain necessary fall risk management equipment:   - Apply yellow socks and bracelet for high fall risk patients  - Consider moving patient to room near nurses station  Outcome: Progressing  Goal: Maintain or return to baseline ADL function  Description: INTERVENTIONS:  -  Assess patient's ability to carry out ADLs; assess patient's baseline for ADL function and identify physical deficits which impact ability to perform ADLs (bathing, care of mouth/teeth, toileting, grooming, dressing, etc.)  - Assess/evaluate cause of self-care deficits   - Assess range of motion  - Assess patient's mobility; develop plan if impaired  - Assess patient's need for assistive devices and provide as appropriate  - Encourage maximum independence but intervene and supervise when necessary  - Involve family in performance of ADLs  - Assess for home care needs following discharge   - Consider OT consult to assist with ADL evaluation and planning for discharge  - Provide patient education as appropriate  Outcome: Progressing  Goal: Maintains/Returns to pre admission functional level  Description: INTERVENTIONS:  - Perform AM-PAC 6 Click Basic Mobility/ Daily Activity assessment daily.  - Set and communicate daily mobility goal to care team and patient/family/caregiver.   - Collaborate with rehabilitation services on mobility goals if consulted  - Perform Range of Motion  times a day.  - Reposition patient every  hours.  - Dangle patient  times a day  - Stand patient  times a day  - Ambulate patient  times a day  - Out of bed to chair  times a day   - Out of bed for meals times a day  - Out of bed for toileting  - Record patient progress and toleration of activity level   Outcome: Progressing     Problem: DISCHARGE PLANNING  Goal: Discharge to home or other facility with appropriate resources  Description: INTERVENTIONS:  - Identify barriers to discharge w/patient and caregiver  - Arrange for  needed discharge resources and transportation as appropriate  - Identify discharge learning needs (meds, wound care, etc.)  - Arrange for interpretive services to assist at discharge as needed  - Refer to Case Management Department for coordinating discharge planning if the patient needs post-hospital services based on physician/advanced practitioner order or complex needs related to functional status, cognitive ability, or social support system  Outcome: Progressing     Problem: Knowledge Deficit  Goal: Patient/family/caregiver demonstrates understanding of disease process, treatment plan, medications, and discharge instructions  Description: Complete learning assessment and assess knowledge base.  Interventions:  - Provide teaching at level of understanding  - Provide teaching via preferred learning methods  Outcome: Progressing

## 2024-10-24 NOTE — DISCHARGE SUMMARY
"Discharge Summary - Hospitalist   Name: Marjorie Castro 44 y.o. male I MRN: [id number]  Unit/Bed#: W -01 I Date of Admission: 10/21/2024   Date of Service: 10/24/2024 I Hospital Day: 0     Assessment & Plan  Renal colic on right side with hydronephrosis  Renal colic had resolved.  Presentation: Patient presented with complaints of severe right sided flank pain and urinary urgency. Patient was recently diagnosed with staghorn renal calculi with subsequent stent placement and lithotripsy performed on 10/21/2024. Patient was discharged home after procedure and developed severe, waxing and waning right sided flank pain. Patient reports increased urgency to urinate without dysuria or gross hematuria. Patient was discharged home on PO Bactrim. Patient reports taking 1 oxycodone around 21:30 without relief; prompting him to present to the ED for further evaluation. Patient denies fever, chills, nausea, vomiting, chest pain or shortness of breath.  CT: \" Moderate right hydronephrosis. Ureteral stent in expected position. Significant perinephric and periureteral fluid and calculi medial to the right ureteropelvic junction suggesting calyceal or ureteral perforation. Stone fragments within the renal pelvis and proximal ureter. Nonobstructing calculi measuring up to 6.6 mm.\"  Urologist on board.  Urine micro: Innumerable RBCs, WBC 2-4, occasional bacteria.  Barboza catheter placed in ED.  Status post strict I&Os.  Status post IV Hydration.  Status post pain control.  Continue Flomax, Oxybutynin and oxycodone was prescribed by the urology advanced practitioner on discharge.  Today, patient's Barboza catheter was removed and underwent voiding trial and he was able to void spontaneously several times.  Urologist was okay with the discharge of the patient.  Patient will follow-up with them in the office.  Patient will have CT scan of the abdomen and pelvis in the outpatient.  Urologist will take care of this.  Abnormal " urinalysis  Patient complains of urinary urgency without dysuria or gross hematuria.   Urine micro: Innumerable RBCs, WBC 2-4, occasional bacteria.  Urine culture: No growth.  Received IV Rocephin in ED, was continued with IV Cefepime due to recent instrumentation.  However, with urine culture revealing no growth, antibiotic was discontinued.  Outpatient follow-up with primary care physician and urologist.  Generalized seizure disorder (HCC)  Continue Brivaracetam.  Outpatient follow-up with primary care physician.  Anemia  Stable and improving spontaneously.  Normocytic anemia with normal RDW.  No noted or reported bleeding.  Possibility of hemodilution from IV fluids.  Monitor in the outpatient.  PCP may facilitate this.  Outpatient follow-up with primary care physician.  Dyspepsia  With GERD episodes here.  Continue PPI.  Continue antiemetic on as-needed basis.  Patient also was prescribed with Maalox on as-needed basis.  Patient was advised to cut back or stop drinking alcohol.  Patient admitted that he regularly drinks alcohol.  Patient also was advised not to take any NSAIDs such as ibuprofen, Motrin, Advil, Aleve.  Outpatient follow-up with primary care physician.     Epistaxis (Resolved: 10/24/2024)  Resolved.  Status post nasal Ocean Spray and Afrin on as-needed basis.     Medical Problems         Resolved Problems  Date Reviewed: 10/24/2024              Resolved     Leukocytosis 10/23/2024       Resolved by  Rick Fontenot MD     Epistaxis 10/24/2024       Resolved by  Rick Fontenot MD         Discharging Physician / Practitioner: Rick Fontenot MD  PCP: MONSE Gottlieb  Admission Date:   Admission Orders (From admission, onward)          Ordered         10/22/24 0209   Place in Observation  Once                               Discharge Date: 10/24/24     Consultations During Hospital Stay:  Urologist.     Procedures Performed:   Please see notes above.      Significant Findings / Test Results:   Please see notes above.     Incidental Findings:   None.     Test Results Pending at Discharge (will require follow up):   None.     Outpatient Tests Requested:  None.  However I am recommending rechecking/monitoring patient's CBC and BMP.  Patient's primary care physician may facilitate this.     Complications:   None.     Reason for Admission: Right flank pain, urinary urgency.     Hospital Course:   Marjorie Castro is a 44 y.o. male patient who originally presented to the hospital on 10/21/2024 due to right flank pain and urinary urgency.  On this admission, patient was seen by urology.  Patient found to have renal colic.  Patient was given IV fluids.  Pain management was done.  Patient was on Flomax, Pyridium and oxybutynin.  Patient was initially placed on IV antibiotic for presumed UTI.  However, urine culture revealed no growth.  Thus antibiotic was discontinued.  Today, patient underwent removal of his Barboza catheter and voiding trial.  Patient had spontaneous urination.  Patient's condition improved.  Patient's right flank pain and urinary urgency had resolved.  Urologist was okay with the discharge of the patient.  Patient will follow-up with them in the office.  According to the advanced practitioner for urology, patient will have a repeat CT scan in the outpatient, which they will order.     Please see above list of diagnoses and related plan for additional information.         Condition at Discharge: stable     Discharge Day Visit / Exam:   Subjective:    Patient was seen and examined this morning and again I visited him this afternoon.  On both occasions, patient was doing fine and better.  Patient denied any more pains.  Patient denied any more nausea or vomiting.  Patient denied any more nosebleeding and shortness of breath.  Patient denied any dizziness.  Patient was able to void spontaneously.  Patient was okay with his discharge to home today.        Vitals:  Blood Pressure: 131/89 (10/24/24 0717)  Pulse: 81 (10/24/24 0717)  Temperature: 97.9 °F (36.6 °C) (10/24/24 0717)  Temp Source: Oral (10/23/24 0700)  Respirations: 18 (10/24/24 0717)  SpO2: 96 % (10/24/24 0717)  Physical Exam  Vitals and nursing note reviewed.   Constitutional:       General: He is not in acute distress.     Appearance: He is not ill-appearing, toxic-appearing or diaphoretic.   HENT:      Mouth/Throat:      Mouth: Mucous membranes are moist.      Pharynx: Oropharynx is clear. No oropharyngeal exudate or posterior oropharyngeal erythema.   Cardiovascular:      Rate and Rhythm: Normal rate and regular rhythm.      Heart sounds: Normal heart sounds.   Pulmonary:      Effort: Pulmonary effort is normal. No respiratory distress.      Breath sounds: Normal breath sounds. No stridor. No wheezing, rhonchi or rales.   Abdominal:      General: Bowel sounds are normal. There is no distension.      Palpations: Abdomen is soft.      Tenderness: There is no abdominal tenderness. There is no right CVA tenderness, left CVA tenderness, guarding or rebound.   Musculoskeletal:      Right lower leg: No edema.      Left lower leg: No edema.   Skin:     General: Skin is warm.      Coloration: Skin is not pale.      Findings: No erythema or rash.   Neurological:      General: No focal deficit present.      Mental Status: He is alert and oriented to person, place, and time.   Psychiatric:         Mood and Affect: Mood normal.         Behavior: Behavior normal.         Thought Content: Thought content normal.                  Discussion with Family: Updated  (wife and and other family members) at bedside.     Discharge instructions/Information to patient and family:   See after visit summary for information provided to patient and family.       Provisions for Follow-Up Care:  See after visit summary for information related to follow-up care and any pertinent home health orders.       Mobility at time of  Discharge:   Basic Mobility Inpatient Raw Score: 24  JH-HLM Goal: 8: Walk 250 feet or more  JH-HLM Achieved: 8: Walk 250 feet ot more  HLM Goal achieved. Continue to encourage appropriate mobility.     Disposition:   Home     Planned Readmission: None.     Discharge Medications:  See after visit summary for reconciled discharge medications provided to patient and/or family.          Administrative Statements[]Expand by Default  Discharge Statement:  I have spent a total time of 40 minutes in caring for this patient on the day of the visit/encounter. >30 minutes of time was spent on: Diagnostic results, Risks and benefits of tx options, Instructions for management, Patient and family education, Importance of tx compliance, Risk factor reductions, Impressions, Counseling / Coordination of care, Documenting in the medical record, Reviewing / ordering tests, medicine, procedures  , and Communicating with other healthcare professionals .     **Please Note: This note may have been constructed using a voice recognition system**

## 2024-10-24 NOTE — PLAN OF CARE
Problem: PAIN - ADULT  Goal: Verbalizes/displays adequate comfort level or baseline comfort level  Description: Interventions:  - Encourage patient to monitor pain and request assistance  - Assess pain using appropriate pain scale  - Administer analgesics based on type and severity of pain and evaluate response  - Implement non-pharmacological measures as appropriate and evaluate response  - Consider cultural and social influences on pain and pain management  - Notify physician/advanced practitioner if interventions unsuccessful or patient reports new pain  Outcome: Progressing     Problem: INFECTION - ADULT  Goal: Absence or prevention of progression during hospitalization  Description: INTERVENTIONS:  - Assess and monitor for signs and symptoms of infection  - Monitor lab/diagnostic results  - Monitor all insertion sites, i.e. indwelling lines, tubes, and drains  - Monitor endotracheal if appropriate and nasal secretions for changes in amount and color  - [place] appropriate cooling/warming therapies per order  - Administer medications as ordered  - Instruct and encourage patient and family to use good hand hygiene technique  - Identify and instruct in appropriate isolation precautions for identified infection/condition  Outcome: Progressing  Goal: Absence of fever/infection during neutropenic period  Description: INTERVENTIONS:  - Monitor WBC    Outcome: Progressing     Problem: SAFETY ADULT  Goal: Patient will remain free of falls  Description: INTERVENTIONS:  - Educate patient/family on patient safety including physical limitations  - Instruct patient to call for assistance with activity   - Consult OT/PT to assist with strengthening/mobility   - Keep Call bell within reach  - Keep bed low and locked with side rails adjusted as appropriate  - Keep care items and personal belongings within reach  - Initiate and maintain comfort rounds  - Make Fall Risk Sign visible to staff  - Offer Toileting every  Hours,  in advance of need  - Initiate/Maintain alarm  - Obtain necessary fall risk management equipment:   - Apply yellow socks and bracelet for high fall risk patients  - Consider moving patient to room near nurses station  Outcome: Progressing  Goal: Maintain or return to baseline ADL function  Description: INTERVENTIONS:  -  Assess patient's ability to carry out ADLs; assess patient's baseline for ADL function and identify physical deficits which impact ability to perform ADLs (bathing, care of mouth/teeth, toileting, grooming, dressing, etc.)  - Assess/evaluate cause of self-care deficits   - Assess range of motion  - Assess patient's mobility; develop plan if impaired  - Assess patient's need for assistive devices and provide as appropriate  - Encourage maximum independence but intervene and supervise when necessary  - Involve family in performance of ADLs  - Assess for home care needs following discharge   - Consider OT consult to assist with ADL evaluation and planning for discharge  - Provide patient education as appropriate  Outcome: Progressing  Goal: Maintains/Returns to pre admission functional level  Description: INTERVENTIONS:  - Perform AM-PAC 6 Click Basic Mobility/ Daily Activity assessment daily.  - Set and communicate daily mobility goal to care team and patient/family/caregiver.   - Collaborate with rehabilitation services on mobility goals if consulted  - Perform [place] of [place] times a day.  - Reposition patient every  hours.  - Dangle patient  times a day  - Stand patient  times a day  - Ambulate patient  times a day  - Out of bed to chair  times a day   - Out of bed for meals  times a day  - Out of bed for toileting  - Record patient progress and toleration of activity level   Outcome: Progressing     Problem: DISCHARGE PLANNING  Goal: Discharge to home or other facility with appropriate resources  Description: INTERVENTIONS:  - Identify barriers to discharge w/patient and caregiver  - Arrange for  needed discharge resources and transportation as appropriate  - Identify discharge learning needs (meds, wound care, etc.)  - Arrange for interpretive services to assist at discharge as needed  - Refer to Case Management Department for coordinating discharge planning if the patient needs post-hospital services based on physician/advanced practitioner order or complex needs related to functional status, cognitive ability, or social support system  Outcome: Progressing

## 2024-10-24 NOTE — ASSESSMENT & PLAN NOTE
Stable.  Normocytic anemia with normal RDW.  No noted or reported bleeding.  Possibility of hemodilution from IV fluids.  Monitor.

## 2024-10-24 NOTE — ASSESSMENT & PLAN NOTE
-POD3 cystoscopy, ureteroscopy, holmium laser lithotripsy, retrograde pyelogram, insertion of R ureteral stent  -VSS, afebrile  -CT showing moderate R hydro, urinary bladder distended. Ureteral stent in good position. Perinephric and periureteral fluid suggesting calcyceal or ureteral perforation.   -Pain resolved  -Barboza removed- urinating well. One small blood clot and urine pyridium tinged.

## 2024-10-24 NOTE — ASSESSMENT & PLAN NOTE
"Renal colic had resolved.  Presentation: Patient presented with complaints of severe right sided flank pain and urinary urgency. Patient was recently diagnosed with staghorn renal calculi with subsequent stent placement and lithotripsy performed on 10/21/2024. Patient was discharged home after procedure and developed severe, waxing and waning right sided flank pain. Patient reports increased urgency to urinate without dysuria or gross hematuria. Patient was discharged home on PO Bactrim. Patient reports taking 1 oxycodone around 21:30 without relief; prompting him to present to the ED for further evaluation. Patient denies fever, chills, nausea, vomiting, chest pain or shortness of breath.  CT: \" Moderate right hydronephrosis. Ureteral stent in expected position. Significant perinephric and periureteral fluid and calculi medial to the right ureteropelvic junction suggesting calyceal or ureteral perforation. Stone fragments within the renal pelvis and proximal ureter. Nonobstructing calculi measuring up to 6.6 mm.\"  Urologist on board.  Urine micro: Innumerable RBCs, WBC 2-4, occasional bacteria.  Barboza catheter placed in ED.  Status post strict I&Os.  Status post IV Hydration.  Status post pain control.  Continue Flomax, Oxybutynin and oxycodone was prescribed by the urology advanced practitioner on discharge.  Today, patient's Barboza catheter was removed and underwent voiding trial and he was able to void spontaneously several times.  Urologist was okay with the discharge of the patient.  Patient will follow-up with them in the office.  Patient will have CT scan of the abdomen and pelvis in the outpatient.  Urologist will take care of this.  " Tetracycline Pregnancy And Lactation Text: This medication is Pregnancy Category D and not consider safe during pregnancy. It is also excreted in breast milk. Erythromycin Counseling:  I discussed with the patient the risks of erythromycin including but not limited to GI upset, allergic reaction, drug rash, diarrhea, increase in liver enzymes, and yeast infections. Bactrim Counseling:  I discussed with the patient the risks of sulfa antibiotics including but not limited to GI upset, allergic reaction, drug rash, diarrhea, dizziness and yeast infections. Topical Sulfur Applications Counseling: Topical Sulfur Counseling: Patient counseled that this medication may cause skin irritation or allergic reactions.  In the event of skin irritation, the patient was advised to reduce the amount of the drug applied or use it less frequently.   The patient verbalized understanding of the proper use and possible adverse effects of topical sulfur application.  All of the patient's questions and concerns were addressed. Include Pregnancy/Lactation Warning?: No Topical Retinoid Pregnancy And Lactation Text: This medication is Pregnancy Category C. It is unknown if this medication is excreted in breast milk. Azithromycin Pregnancy And Lactation Text: This medication is considered safe during pregnancy and is also secreted in breast milk. Sarecycline Counseling: Patient advised regarding possible photosensitivity and discoloration of the teeth, skin, lips, tongue and gums.  Patient instructed to avoid sunlight, if possible.  When exposed to sunlight, patients should wear protective clothing, sunglasses, and sunscreen.  The patient was instructed to call the office immediately if the following severe adverse effects occur:  hearing changes, easy bruising/bleeding, severe headache, or vision changes.  The patient verbalized understanding of the proper use and possible adverse effects of sarecycline.  All of the patient's questions and concerns were addressed. High Dose Vitamin A Counseling: Side effects reviewed, pt to contact office should one occur. Detail Level: Zone Azelaic Acid Pregnancy And Lactation Text: This medication is considered safe during pregnancy and breast feeding. Erythromycin Pregnancy And Lactation Text: This medication is Pregnancy Category B and is considered safe during pregnancy. It is also excreted in breast milk. Benzoyl Peroxide Counseling: Patient counseled that medicine may cause skin irritation and bleach clothing.  In the event of skin irritation, the patient was advised to reduce the amount of the drug applied or use it less frequently.   The patient verbalized understanding of the proper use and possible adverse effects of benzoyl peroxide.  All of the patient's questions and concerns were addressed. Spironolactone Counseling: Patient advised regarding risks of diarrhea, abdominal pain, dizziness, low blood pressure, headache, breast swelling/tenderness, irregular menstrual cycles, birth defects (for female patients). The patient verbalized understanding of the proper use and possible adverse effects of spironolactone.  All of the patient's questions and concerns were addressed. Topical Sulfur Applications Pregnancy And Lactation Text: This medication is Pregnancy Category C and has an unknown safety profile during pregnancy. It is unknown if this topical medication is excreted in breast milk. Dapsone Pregnancy And Lactation Text: This medication is Pregnancy Category C and is not considered safe during pregnancy or breast feeding. Aklief counseling:  Patient advised to apply a pea-sized amount only at bedtime and wait 30 minutes after washing their face before applying.  If too drying, patient may add a non-comedogenic moisturizer.  The most commonly reported side effects including irritation, redness, scaling, dryness, stinging, burning, itching, and increased risk of sunburn.  The patient verbalized understanding of the proper use and possible adverse effects of retinoids.  All of the patient's questions and concerns were addressed. High Dose Vitamin A Pregnancy And Lactation Text: High dose vitamin A therapy is contraindicated during pregnancy and breast feeding. Dapsone Counseling: I discussed with the patient the risks of dapsone including but not limited to hemolytic anemia, agranulocytosis, rashes, methemoglobinemia, kidney failure, peripheral neuropathy, headaches, GI upset, and liver toxicity.  Patients who start dapsone require monitoring including baseline LFTs and weekly CBCs for the first month, then every month thereafter.  The patient verbalized understanding of the proper use and possible adverse effects of dapsone.  All of the patient's questions and concerns were addressed. Tazorac Counseling:  Patient advised that medication is irritating and drying.  Patient may need to apply sparingly and wash off after an hour before eventually leaving it on overnight.  The patient verbalized understanding of the proper use and possible adverse effects of tazorac.  All of the patient's questions and concerns were addressed. Winlevi Counseling:  I discussed with the patient the risks of topical clascoterone including but not limited to erythema, scaling, itching, and stinging. Patient voiced their understanding. Doxycycline Counseling:  Patient counseled regarding possible photosensitivity and increased risk for sunburn and GI upset if not taken with food.  Patient instructed to avoid sunlight, if possible.  When exposed to sunlight, patients should wear protective clothing, sunglasses, and sunscreen.  The patient was instructed to call the office immediately if the following severe adverse effects occur:  hearing changes, easy bruising/bleeding, severe headache, or vision changes.  The patient verbalized understanding of the proper use and possible adverse effects of doxycycline.  All of the patient's questions and concerns were addressed. Aklief Pregnancy And Lactation Text: It is unknown if this medication is safe to use during pregnancy.  It is unknown if this medication is excreted in breast milk.  Breastfeeding women should use the topical cream on the smallest area of the skin for the shortest time needed while breastfeeding.  Do not apply to nipple and areola. Topical Clindamycin Counseling: Patient counseled that this medication may cause skin irritation or allergic reactions.  In the event of skin irritation, the patient was advised to reduce the amount of the drug applied or use it less frequently.   The patient verbalized understanding of the proper use and possible adverse effects of clindamycin.  All of the patient's questions and concerns were addressed. Birth Control Pills Counseling: Birth Control Pill Counseling: I discussed with the patient the potential side effects of OCPs including but not limited to increased risk of stroke, heart attack, thrombophlebitis, deep venous thrombosis, hepatic adenomas, breast changes, GI upset, headaches, and depression.  The patient verbalized understanding of the proper use and possible adverse effects of OCPs. All of the patient's questions and concerns were addressed. Spironolactone Pregnancy And Lactation Text: This medication can cause feminization of the male fetus and should be avoided during pregnancy. The active metabolite is also found in breast milk. Bactrim Pregnancy And Lactation Text: This medication is Pregnancy Category D and is known to cause fetal risk.  It is also excreted in breast milk. Tazorac Pregnancy And Lactation Text: This medication is not safe during pregnancy. It is unknown if this medication is excreted in breast milk. Minocycline Counseling: Patient advised regarding possible photosensitivity and discoloration of the teeth, skin, lips, tongue and gums. The patient was instructed to call the office immediately if the following severe adverse effects occur:  hearing changes, easy bruising/bleeding, severe headache, or vision changes.  The patient verbalized understanding of the proper use and possible adverse effects of minocycline.  All of the patient's questions and concerns were addressed. Benzoyl Peroxide Pregnancy And Lactation Text: This medication is Pregnancy Category C. It is unknown if benzoyl peroxide is excreted in breast milk. Use Enhanced Medication Counseling?: Yes Isotretinoin Counseling: Patient should get blood tests for monitoring, not donate blood, not drive at night if vision affected, not share medication, and not undergo elective surgery for 6 months after tx completed. Side effects reviewed, pt to contact office should one occur. Registration and compliance with iPledge program is mandatory. Azelaic Acid Counseling: Patient counseled that medicine may cause skin irritation and to avoid applying near the eyes.  In the event of skin irritation, the patient was advised to reduce the amount of the drug applied or use it less frequently.   The patient verbalized understanding of the proper use and possible adverse effects of azelaic acid.  All of the patient's questions and concerns were addressed. Isotretinoin Pregnancy And Lactation Text: This medication is Pregnancy Category X and is considered extremely dangerous during pregnancy. It is unknown if it is excreted in breast milk. Doxycycline Pregnancy And Lactation Text: This medication is Pregnancy Category D and not consider safe during pregnancy. It is also excreted in breast milk but is considered safe for shorter treatment courses. Topical Clindamycin Pregnancy And Lactation Text: This medication is Pregnancy Category B and is considered safe during pregnancy. It is unknown if it is excreted in breast milk. Birth Control Pills Pregnancy And Lactation Text: This medication should be avoided if pregnant and for the first 30 days post-partum. Tetracycline Counseling: Patient counseled regarding possible photosensitivity and increased risk for sunburn.  Patient instructed to avoid sunlight, if possible.  When exposed to sunlight, patients should wear protective clothing, sunglasses, and sunscreen.  The patient was instructed to call the office immediately if the following severe adverse effects occur:  hearing changes, easy bruising/bleeding, severe headache, or vision changes.  The patient verbalized understanding of the proper use and possible adverse effects of tetracycline.  All of the patient's questions and concerns were addressed. Patient understands to avoid pregnancy while on therapy due to potential birth defects. Topical Retinoid counseling:  Patient advised to apply a pea-sized amount only at bedtime and wait 30 minutes after washing their face before applying.  If too drying, patient may add a non-comedogenic moisturizer. The patient verbalized understanding of the proper use and possible adverse effects of retinoids.  All of the patient's questions and concerns were addressed. Winlevi Pregnancy And Lactation Text: This medication is considered safe during pregnancy and breastfeeding. Azithromycin Counseling:  I discussed with the patient the risks of azithromycin including but not limited to GI upset, allergic reaction, drug rash, diarrhea, and yeast infections.

## 2024-10-24 NOTE — ASSESSMENT & PLAN NOTE
With GERD episodes here.  Continue PPI.  Continue antiemetic on as-needed basis.  Patient also was prescribed with Maalox on as-needed basis.  Patient was advised to cut back or stop drinking alcohol.  Patient admitted that he regularly drinks alcohol.  Patient also was advised not to take any NSAIDs such as ibuprofen, Motrin, Advil, Aleve.  Outpatient follow-up with primary care physician.

## 2024-10-24 NOTE — ASSESSMENT & PLAN NOTE
Patient complains of urinary urgency without dysuria or gross hematuria.   Urine micro: Innumerable RBCs, WBC 2-4, occasional bacteria.  Urine culture: No growth.  Received IV Rocephin in ED, was continued with IV Cefepime due to recent instrumentation.  However, with urine culture revealing no growth, antibiotic was discontinued.  Outpatient follow-up with primary care physician and urologist.

## 2024-10-24 NOTE — PROGRESS NOTES
"Progress Note - Hospitalist   Name: Marjorie Castro 44 y.o. male I MRN: [id number]  Unit/Bed#: W -01 I Date of Admission: 10/21/2024   Date of Service: 10/24/2024 I Hospital Day: 0     Assessment & Plan  Renal colic on right side with hydronephrosis  Resolved.  Presentation: Patient presented with complaints of severe right sided flank pain and urinary urgency. Patient was recently diagnosed with staghorn renal calculi with subsequent stent placement and lithotripsy performed on 10/21/2024. Patient was discharged home after procedure and developed severe, waxing and waning right sided flank pain. Patient reports increased urgency to urinate without dysuria or gross hematuria. Patient was discharged home on PO Bactrim. Patient reports taking 1 oxycodone around 21:30 without relief; prompting him to present to the ED for further evaluation. Patient denies fever, chills, nausea, vomiting, chest pain or shortness of breath.  CT: \" Moderate right hydronephrosis. Ureteral stent in expected position. Significant perinephric and periureteral fluid and calculi medial to the right ureteropelvic junction suggesting calyceal or ureteral perforation. Stone fragments within the renal pelvis and proximal ureter. Nonobstructing calculi measuring up to 6.6 mm.\"  Urologist on board.  Urine micro: Innumerable RBCs, WBC 2-4, occasional bacteria.  Barboza catheter placed in ED.  Status post strict I&Os.  Status post IV Hydration.  Status post pain control.  Continue Flomax, Oxybutynin and oxycodone was prescribed by the urology advanced practitioner on discharge.  Today, patient's Barboza catheter was removed and underwent voiding trial and he was able to void spontaneously several times.  Urologist was okay with the discharge of the patient.  Patient will follow-up with them in the office.  Patient will have CT scan of the abdomen and pelvis in the outpatient.  Urologist will take care of this.  Abnormal urinalysis  Patient complains " of urinary urgency without dysuria or gross hematuria.   Urine micro: Innumerable RBCs, WBC 2-4, occasional bacteria.  Urine culture: No growth.  Received IV Rocephin in ED, was continued with IV Cefepime due to recent instrumentation.  However, with urine culture revealing no growth, antibiotic was discontinued.  Outpatient follow-up with primary care physician and urologist.  Generalized seizure disorder (HCC)  Continue Brivaracetam.  Outpatient follow-up with primary care physician.  Anemia  Stable and improving spontaneously.  Normocytic anemia with normal RDW.  No noted or reported bleeding.  Possibility of hemodilution from IV fluids.  Monitor in the outpatient.  PCP may facilitate this.  Outpatient follow-up with primary care physician.  Dyspepsia  With GERD episodes here.  Continue PPI.  Continue antiemetic on as-needed basis.  Patient also was prescribed with Maalox on as-needed basis.  Patient was advised to cut back or stop drinking alcohol.  Patient admitted that he regularly drinks alcohol.  Patient also was advised not to take any NSAIDs such as ibuprofen, Motrin, Advil, Aleve.  Outpatient follow-up with primary care physician.  Epistaxis (Resolved: 10/24/2024)  Resolved.  Status post nasal Ocean Spray and Afrin on as-needed basis.    Medical Problems       Resolved Problems  Date Reviewed: 10/24/2024            Resolved    Leukocytosis 10/23/2024     Resolved by  Rick Fontenot MD    Epistaxis 10/24/2024     Resolved by  Rick Fontenot MD        Discharging Physician / Practitioner: Rick Fontenot MD  PCP: MONSE Gottlieb  Admission Date:   Admission Orders (From admission, onward)       Ordered        10/22/24 0209  Place in Observation  Once                          Discharge Date: 10/24/24    Consultations During Hospital Stay:  Urologist.    Procedures Performed:   Please see notes above.    Significant Findings / Test Results:   Please see notes  above.    Incidental Findings:   None.    Test Results Pending at Discharge (will require follow up):   None.     Outpatient Tests Requested:  None.  However I am recommending rechecking/monitoring patient's CBC and BMP.  Patient's primary care physician may facilitate this.    Complications:   None.    Reason for Admission: Right flank pain, urinary urgency.    Hospital Course:   Marjorie Castro is a 44 y.o. male patient who originally presented to the hospital on 10/21/2024 due to right flank pain and urinary urgency.  On this admission, patient was seen by urology.  Patient found to have renal colic.  Patient was given IV fluids.  Pain management was done.  Patient was on Flomax, Pyridium and oxybutynin.  Patient was initially placed on IV antibiotic for presumed UTI.  However, urine culture revealed no growth.  Thus antibiotic was discontinued.  Today, patient underwent removal of his Barboza catheter and voiding trial.  Patient had spontaneous urination.  Patient's condition improved.  Patient's right flank pain and urinary urgency had resolved.  Urologist was okay with the discharge of the patient.  Patient will follow-up with them in the office.  According to the advanced practitioner for urology, patient will have a repeat CT scan in the outpatient, which they will order.    Please see above list of diagnoses and related plan for additional information.       Condition at Discharge: stable    Discharge Day Visit / Exam:   Subjective:    Patient was seen and examined this morning and again I visited him this afternoon.  On both occasions, patient was doing fine and better.  Patient denied any more pains.  Patient denied any more nausea or vomiting.  Patient denied any more nosebleeding and shortness of breath.  Patient denied any dizziness.  Patient was able to void spontaneously.  Patient was okay with his discharge to home today.      Vitals: Blood Pressure: 131/89 (10/24/24 0717)  Pulse: 81 (10/24/24  0717)  Temperature: 97.9 °F (36.6 °C) (10/24/24 0717)  Temp Source: Oral (10/23/24 0700)  Respirations: 18 (10/24/24 0717)  SpO2: 96 % (10/24/24 0717)  Physical Exam  Vitals and nursing note reviewed.   Constitutional:       General: He is not in acute distress.     Appearance: He is not ill-appearing, toxic-appearing or diaphoretic.   HENT:      Mouth/Throat:      Mouth: Mucous membranes are moist.      Pharynx: Oropharynx is clear. No oropharyngeal exudate or posterior oropharyngeal erythema.   Cardiovascular:      Rate and Rhythm: Normal rate and regular rhythm.      Heart sounds: Normal heart sounds.   Pulmonary:      Effort: Pulmonary effort is normal. No respiratory distress.      Breath sounds: Normal breath sounds. No stridor. No wheezing, rhonchi or rales.   Abdominal:      General: Bowel sounds are normal. There is no distension.      Palpations: Abdomen is soft.      Tenderness: There is no abdominal tenderness. There is no right CVA tenderness, left CVA tenderness, guarding or rebound.   Musculoskeletal:      Right lower leg: No edema.      Left lower leg: No edema.   Skin:     General: Skin is warm.      Coloration: Skin is not pale.      Findings: No erythema or rash.   Neurological:      General: No focal deficit present.      Mental Status: He is alert and oriented to person, place, and time.   Psychiatric:         Mood and Affect: Mood normal.         Behavior: Behavior normal.         Thought Content: Thought content normal.              Discussion with Family: Updated  (wife and and other family members) at bedside.    Discharge instructions/Information to patient and family:   See after visit summary for information provided to patient and family.      Provisions for Follow-Up Care:  See after visit summary for information related to follow-up care and any pertinent home health orders.      Mobility at time of Discharge:   Basic Mobility Inpatient Raw Score: 24  -[facility] Goal: 8: Walk  250 feet or more  [facility]-HLM Achieved: 8: Walk 250 feet ot more  HLM Goal achieved. Continue to encourage appropriate mobility.     Disposition:   Home    Planned Readmission: None.    Discharge Medications:  See after visit summary for reconciled discharge medications provided to patient and/or family.      Administrative Statements   Discharge Statement:  I have spent a total time of 40 minutes in caring for this patient on the day of the visit/encounter. >30 minutes of time was spent on: Diagnostic results, Risks and benefits of tx options, Instructions for management, Patient and family education, Importance of tx compliance, Risk factor reductions, Impressions, Counseling / Coordination of care, Documenting in the medical record, Reviewing / ordering tests, medicine, procedures  , and Communicating with other healthcare professionals .    **Please Note: This note may have been constructed using a voice recognition system**

## 2024-10-24 NOTE — DISCHARGE INSTR - AVS FIRST PAGE
Jin obtain CT scan in one week.     Dr Gates will review Ct scan to determine if you need stent removal in the office versus another procedure to clear out remaining stones.     Stay hydrated. Oxybutinin, flomax for stent discomfort. If you are noticing decreased urine volume or abdominal pain please discontinue oxybutinin as this relaxes the bladder and can cause urinary retention.       The urologist also prescribed you with oxycodone, as needed for severe pains.  This medication could make you drowsy.  Thus take the necessary precautions if you take this medication.  Do not drive if you take this medication.  Hydrate yourself well every day.  As discussed with you, cut back on your alcohol drinking or better stop drinking alcohol.  If you develop fever or chills and/or difficulty urinating and/or significant pains and/or shortness of breath and/or bleeding/blood in your urine, call your primary care physician and/or go to the nearest emergency room.

## 2024-10-24 NOTE — PLAN OF CARE
Problem: PAIN - ADULT  Goal: Verbalizes/displays adequate comfort level or baseline comfort level  Description: Interventions:  - Encourage patient to monitor pain and request assistance  - Assess pain using appropriate pain scale  - Administer analgesics based on type and severity of pain and evaluate response  - Implement non-pharmacological measures as appropriate and evaluate response  - Consider cultural and social influences on pain and pain management  - Notify physician/advanced practitioner if interventions unsuccessful or patient reports new pain  10/24/2024 1453 by Ivonne Swanson LPN  Outcome: Adequate for Discharge  10/24/2024 1453 by Ivonne Swanson LPN  Outcome: Progressing  10/24/2024 1126 by Ivonne Swanson LPN  Outcome: Progressing     Problem: INFECTION - ADULT  Goal: Absence or prevention of progression during hospitalization  Description: INTERVENTIONS:  - Assess and monitor for signs and symptoms of infection  - Monitor lab/diagnostic results  - Monitor all insertion sites, i.e. indwelling lines, tubes, and drains  - Monitor endotracheal if appropriate and nasal secretions for changes in amount and color  - [place] appropriate cooling/warming therapies per order  - Administer medications as ordered  - Instruct and encourage patient and family to use good hand hygiene technique  - Identify and instruct in appropriate isolation precautions for identified infection/condition  10/24/2024 1453 by Ivonne Swanson LPN  Outcome: Adequate for Discharge  10/24/2024 1453 by Ivonne Swanson LPN  Outcome: Progressing  10/24/2024 1126 by Ivonne Swanson LPN  Outcome: Progressing  Goal: Absence of fever/infection during neutropenic period  Description: INTERVENTIONS:  - Monitor WBC    10/24/2024 1453 by Ivonne Swanson LPN  Outcome: Adequate for Discharge  10/24/2024 1453 by Ivonne Swanson LPN  Outcome: Progressing  10/24/2024 1126 by Ivonne Swanson LPN  Outcome: Progressing      Problem: SAFETY ADULT  Goal: Patient will remain free of falls  Description: INTERVENTIONS:  - Educate patient/family on patient safety including physical limitations  - Instruct patient to call for assistance with activity   - Consult OT/PT to assist with strengthening/mobility   - Keep Call bell within reach  - Keep bed low and locked with side rails adjusted as appropriate  - Keep care items and personal belongings within reach  - Initiate and maintain comfort rounds  - Make Fall Risk Sign visible to staff  - Offer Toileting every  Hours, in advance of need  - Initiate/Maintain alarm  - Obtain necessary fall risk management equipment:   - Apply yellow socks and bracelet for high fall risk patients  - Consider moving patient to room near nurses station  10/24/2024 1453 by Ivonne Swanson LPN  Outcome: Adequate for Discharge  10/24/2024 1453 by Ivonne Swanson LPN  Outcome: Progressing  10/24/2024 1126 by Ivonne Swanson LPN  Outcome: Progressing  Goal: Maintain or return to baseline ADL function  Description: INTERVENTIONS:  -  Assess patient's ability to carry out ADLs; assess patient's baseline for ADL function and identify physical deficits which impact ability to perform ADLs (bathing, care of mouth/teeth, toileting, grooming, dressing, etc.)  - Assess/evaluate cause of self-care deficits   - Assess range of motion  - Assess patient's mobility; develop plan if impaired  - Assess patient's need for assistive devices and provide as appropriate  - Encourage maximum independence but intervene and supervise when necessary  - Involve family in performance of ADLs  - Assess for home care needs following discharge   - Consider OT consult to assist with ADL evaluation and planning for discharge  - Provide patient education as appropriate  10/24/2024 1453 by Ivonne Swanson LPN  Outcome: Adequate for Discharge  10/24/2024 1453 by Ivonne Swanson LPN  Outcome: Progressing  10/24/2024 1126 by Ivonne Swanson  LPN  Outcome: Progressing  Goal: Maintains/Returns to pre admission functional level  Description: INTERVENTIONS:  - Perform AM-PAC 6 Click Basic Mobility/ Daily Activity assessment daily.  - Set and communicate daily mobility goal to care team and patient/family/caregiver.   - Collaborate with rehabilitation services on mobility goals if consulted  - Perform Range of Motion  times a day.  - Reposition patient every  hours.  - Dangle patient  times a day  - Stand patient  times a day  - Ambulate patient  times a day  - Out of bed to chair  times a day   - Out of bed for meals times a day  - Out of bed for toileting  - Record patient progress and toleration of activity level   10/24/2024 1453 by Ivonne Swanson LPN  Outcome: Adequate for Discharge  10/24/2024 1453 by Ivonne Swanson LPN  Outcome: Progressing  10/24/2024 1126 by Ivonne Swanson LPN  Outcome: Progressing     Problem: DISCHARGE PLANNING  Goal: Discharge to home or other facility with appropriate resources  Description: INTERVENTIONS:  - Identify barriers to discharge w/patient and caregiver  - Arrange for needed discharge resources and transportation as appropriate  - Identify discharge learning needs (meds, wound care, etc.)  - Arrange for interpretive services to assist at discharge as needed  - Refer to Case Management Department for coordinating discharge planning if the patient needs post-hospital services based on physician/advanced practitioner order or complex needs related to functional status, cognitive ability, or social support system  10/24/2024 1453 by Ivonne Swanson LPN  Outcome: Adequate for Discharge  10/24/2024 1453 by Ivonne Swanson LPN  Outcome: Progressing  10/24/2024 1126 by Ivonne Swanson LPN  Outcome: Progressing     Problem: Knowledge Deficit  Goal: Patient/family/caregiver demonstrates understanding of disease process, treatment plan, medications, and discharge instructions  Description: Complete learning  assessment and assess knowledge base.  Interventions:  - Provide teaching at level of understanding  - Provide teaching via preferred learning methods  10/24/2024 1453 by Ivonne Swanson LPN  Outcome: Adequate for Discharge  10/24/2024 1453 by Ivonne Swanson LPN  Outcome: Progressing  10/24/2024 1126 by Ivonne Swanson LPN  Outcome: Progressing

## 2024-10-24 NOTE — ASSESSMENT & PLAN NOTE
Patient complained of nosebleeding earlier today.  Due to this, he told me, that he had difficulty breathing. From my discussion with the patient's nurse, the nosebleeding was mild.  Patient admitted to being anxious as he is claustrophobic in the room at [facility].  Thus, I requested to the patient's nurse to possibly transfer the patient to a room upstairs.  Nosebleeding and difficulty breathing had resolved.  Nasal Ocean Spray and Afrin on as-needed basis.

## 2024-10-28 NOTE — TELEPHONE ENCOUNTER
Spoke with patient to assist him with scheduling his ordered CT-scan by Dr. Gates. Although patient had CT-scan with contrast while in the ER on 10/21,this nurse explained the rationale of needed new CT-scan without contrast that should've been done a week after stent was placed. Patient verbalized understanding. Patient has been scheduled by Jessica of [facility] [employer] to have CT-scan done on 11/1/24 at 9:45 am in [place]. Patient is agreeable with set time and date.

## 2024-10-28 NOTE — TELEPHONE ENCOUNTER
Pt calling in regards to stent removal.     Pt discharged from hospital on 10/24 and is calling to see how to proceed from here with stent removal and if CT scan still recommended by provider. Please review.    Pt call back- [phone]

## 2024-11-01 ENCOUNTER — HOSPITAL ENCOUNTER (OUTPATIENT)
Dept: RADIOLOGY | Facility: HOSPITAL | Age: 44
Discharge: HOME | End: 2024-11-01
Payer: COMMERCIAL

## 2024-11-01 DIAGNOSIS — N20.0 KIDNEY STONE: ICD-10-CM

## 2024-11-01 DIAGNOSIS — N23 RENAL COLIC ON RIGHT SIDE: ICD-10-CM

## 2024-11-01 PROCEDURE — 74176 CT ABD & PELVIS W/O CONTRAST: CPT

## 2024-11-01 NOTE — TELEPHONE ENCOUNTER
"Patient was calling because he got his CT done. Upon review of the previous encounters, Dr. Gates stated \"Based on the results of the CT, I will decide whether or not we need to do repeat ureteroscopy versus office stent removal \"    Reviewed this with the patient. And the fact that he just got the CT done this morning. The results have not posted yet.     Advised it does normally take a few days, sometimes up to a week for the results to post and this does not include weekends    Explained that the results actually go to the patient first. Walked through the [employer] and where to find those results when they do post. And if he sees the results have posted, he can feel free to contact the office to make them aware so the doctor can review  "

## 2024-11-04 ENCOUNTER — NURSE TRIAGE (OUTPATIENT)
Age: 44
End: 2024-11-04

## 2024-11-04 ENCOUNTER — APPOINTMENT (OUTPATIENT)
Dept: LAB | Facility: MEDICAL CENTER | Age: 44
End: 2024-11-04
Payer: COMMERCIAL

## 2024-11-04 DIAGNOSIS — R31.0 GROSS HEMATURIA: ICD-10-CM

## 2024-11-04 DIAGNOSIS — R31.0 GROSS HEMATURIA: Primary | ICD-10-CM

## 2024-11-04 LAB

## 2024-11-04 PROCEDURE — 81001 URINALYSIS AUTO W/SCOPE: CPT

## 2024-11-04 PROCEDURE — 87086 URINE CULTURE/COLONY COUNT: CPT

## 2024-11-04 NOTE — TELEPHONE ENCOUNTER
These can be common stent colic symptoms. Can use Flomax and Oxybutynin as prescribed for stent colic. Increase water and minimize bladder irritants. Can try to call reading room to read CT

## 2024-11-04 NOTE — TELEPHONE ENCOUNTER
Called reading room. Will expedite read. Called and spoke with Marjorie Castro and informed pt. I advised normal stent symptoms he is having. Did go for urine testing to rule out infection. Advised increase in water intake and avoidance of bladder irritants. States was advised to not take Oxybutynin as it was causing difficulty urinating. Will call pt with determination of plan once CT results. Pt verbalized understanding.

## 2024-11-04 NOTE — TELEPHONE ENCOUNTER
Patient called in d/t discomfort with stent as well as hematuria starting this AM. Patient very anxious to get stent out and understands that we are awaiting CT Scan results to decide next steps. Requesting that we ask radiology to read CT Scan stat now that he is having hematuria. Advised patient that sometimes bleeding is normal with stent. Patient does report burning with urination and flank pain that he states he has had ever since stent placed. Denies fever, denies clots. Ordered urine testing to rule out UTI. Reviewed Hydration, avoidance of bladder irritants and ED precautions. Please advise.         Reason for Disposition   Questions about, urinary health    Answer Assessment - Initial Assessment Questions  1. When did you start with blood in your urine, and can you describe things in detail? Do you have any blood clots, is the hematuria continuous or intermittent and can you describe the color of the blood in your urine in relation to a beverage?   Today, no clots, red wine color  2. Do you have lower back, flank, or lower abdominal/bladder pain?   Lower back d/t stent  3. Are you experiencing urinary frequency, urgency, pain or burning or any other changes in your urination like being unable to urinate?   Frequency and burning  4. Do you take any blood thinners?   no  5. Have you recently taken rifampin or Pyridium? If yes, for what?   no  6. Have you had any recent urine testing or imaging? (UA with micro, urine culture, kidney ultrasound, CT scan)? Or any recent urologic surgery or procedures?   CT Scan pending  7. Have you ever had a workup for blood in the urine?   no  8. Have you eaten a lot of red dye or beets recently?  no  9. Do you have a history of bladder cancer?   no    Protocols used: Urology-Gross Hemaruria-ADULT-[place]

## 2024-11-05 ENCOUNTER — PREP FOR PROCEDURE (OUTPATIENT)
Dept: UROLOGY | Facility: CLINIC | Age: 44
End: 2024-11-05

## 2024-11-05 DIAGNOSIS — N20.0 KIDNEY STONE: Primary | ICD-10-CM

## 2024-11-05 LAB — BACTERIA UR CULT: NORMAL

## 2024-11-05 RX ORDER — SODIUM CHLORIDE, SODIUM LACTATE, POTASSIUM CHLORIDE, CALCIUM CHLORIDE 600; 310; 30; 20 MG/100ML; MG/100ML; MG/100ML; MG/100ML
75 INJECTION, SOLUTION INTRAVENOUS CONTINUOUS
OUTPATIENT
Start: 2024-11-05

## 2024-11-06 ENCOUNTER — APPOINTMENT (OUTPATIENT)
Dept: LAB | Facility: MEDICAL CENTER | Age: 44
End: 2024-11-06
Payer: COMMERCIAL

## 2024-11-06 ENCOUNTER — PREP FOR PROCEDURE (OUTPATIENT)
Dept: UROLOGY | Facility: CLINIC | Age: 44
End: 2024-11-06

## 2024-11-06 DIAGNOSIS — R39.89 SUSPECTED UTI: Primary | ICD-10-CM

## 2024-11-06 DIAGNOSIS — N20.0 KIDNEY STONE: ICD-10-CM

## 2024-11-06 DIAGNOSIS — R39.89 SUSPECTED UTI: ICD-10-CM

## 2024-11-06 PROCEDURE — 87086 URINE CULTURE/COLONY COUNT: CPT

## 2024-11-06 NOTE — TELEPHONE ENCOUNTER
Spoke with patient and confirmed surgery date of: 11/20/24  Type of surgery:Ureteroscopy   Operating physician: Dr. Romero  Location of surgery: [place]     Verbally went over prep with patient on: 11/06/24  NPO  Bowel prep? No  Hospital calls afternoon prior with arrival time -Calls Friday afternoon for Monday surgeries  Patient needs ride to and from surgery (outpatient/inpatient)   Pre-op testing to be done 2 weeks prior to surgery  U/c   Blood thinners:   Pat will call a week prior   Clearances needed: none    Mailed/emailed to patient on:  Copy of packet scanned into Media on:  Labs in packet  Soap / Bowel prep in packet  Pre-op & Post-op in packet  Dates of H&P and post-op if needed    Consent: on admit

## 2024-11-06 NOTE — TELEPHONE ENCOUNTER
Spoke with patient he wants sooner than date offered however there is no other time available with other providers at this time

## 2024-11-07 LAB — BACTERIA UR CULT: NORMAL

## 2024-11-08 NOTE — TELEPHONE ENCOUNTER
Pt requesting call back from  to review questions regarding surgery.     Pt asking why Dr Romero will be performing surgery instead of Dr Gates. Pt also asking if he will be having stent exchanged during surgery.    Pt call back- [phone]

## 2024-11-08 NOTE — TELEPHONE ENCOUNTER
Spoke with patient and explained to him that he chose the soonest date that's why  is doing the surgery.  Also answered any questions his stent

## 2024-11-12 NOTE — TELEPHONE ENCOUNTER
Patient called back to report he is having bladder spasms/cramping. Patient has oxybutynin at home and was unaware what it was for.     Advised patient to take oxybutynin to help with cramping/spasms.       Patient will call back if any further help is needed.

## 2024-11-15 RX ORDER — POLYETHYLENE GLYCOL 3350 17 G/17G
17 POWDER, FOR SOLUTION ORAL DAILY
COMMUNITY

## 2024-11-15 NOTE — PRE-PROCEDURE INSTRUCTIONS
Pre-Surgery Instructions:   Medication Instructions    acetaminophen (TYLENOL) 325 mg tablet Uses PRN- OK to take day of surgery    aluminum-magnesium hydroxide-simethicone (MAALOX) 7680-6190-167 mg/30 mL suspension Stop taking 7 days prior to surgery.    Brivaracetam 100 MG TABS Take day of surgery.    ondansetron (ZOFRAN) 4 mg tablet Uses PRN- OK to take day of surgery    oxybutynin (DITROPAN-XL) 5 mg 24 hr tablet Uses PRN- DO NOT take day of surgery    oxyCODONE (ROXICODONE) 5 immediate release tablet Uses PRN- OK to take day of surgery    pantoprazole (PROTONIX) 40 mg tablet Uses PRN- OK to take day of surgery    polyethylene glycol (MIRALAX) 17 g packet Hold day of surgery.    tamsulosin (FLOMAX) 0.4 mg Uses PRN- DO NOT take day of surgery   Medication instructions for day surgery reviewed. Please use only a sip of water to take your instructed medications. Avoid all over the counter vitamins, supplements and NSAIDS for one week prior to surgery per anesthesia guidelines. Tylenol is ok to take as needed.     You will receive a call one business day prior to surgery with an arrival time and hospital directions. If your surgery is scheduled on a Monday, the hospital will be calling you on the Friday prior to your surgery. If you have not heard from anyone by 8pm, please call the hospital supervisor through the hospital  at [phone]. ([place] [phone] or [place] [phone]).    Do not eat or drink anything after midnight the night before your surgery, including candy, mints, lifesavers, or chewing gum. Do not drink alcohol 24hrs before your surgery. Try not to smoke at least 24hrs before your surgery.       Follow the pre surgery showering instructions as listed in the “My Surgical Experience Booklet” or otherwise provided by your surgeon's office. Do not use a blade to shave the surgical area 1 week before surgery. It is okay to use a clean electric clippers up to 24 hours before surgery. Do  not apply any lotions, creams, including makeup, cologne, deodorant, or perfumes after showering on the day of your surgery. Do not use dry shampoo, hair spray, hair gel, or any type of hair products.     No contact lenses, eye make-up, or artificial eyelashes. Remove nail polish, including gel polish, and any artificial, gel, or acrylic nails if possible. Remove all jewelry including rings and body piercing jewelry.     Wear causal clothing that is easy to take on and off. Consider your type of surgery.    Keep any valuables, jewelry, piercings at home. Please bring any specially ordered equipment (sling, braces) if indicated.    Arrange for a responsible person to drive you to and from the hospital on the day of your surgery. Please confirm the visitor policy for the day of your procedure when you receive your phone call with an arrival time.     Call the surgeon's office with any new illnesses, exposures, or additional questions prior to surgery.    Please reference your “My Surgical Experience Booklet” for additional information to prepare for your upcoming surgery.    Pt. Verbalized an understanding of all instructions reviewed and offers no concerns at this time.      [facility] on [facility]

## 2024-11-15 NOTE — TELEPHONE ENCOUNTER
Patient called in to report he has been continuing with hematuria and now has blood clots ranging in small to large along with dysuria. Reports symptoms have been ongoing since urine testing over a week ago. Denies any fevers or vomiting. Patient states color of blood ranges from a bright red to a dark red. Denies any issues with emptying his bladder. Patient is scheduled for CYSTOSCOPY URETEROSCOPY WITH LITHOTRIPSY HOLMIUM LASER, RETROGRADE PYELOGRAM AND INSERTION STENT URETERAL (Right: Bladder) on 11/20.  Advised to increase water intake and avoid bladder irritants. ED precautions reviewed. Please advise on recommendations.

## 2024-11-19 ENCOUNTER — ANESTHESIA EVENT (OUTPATIENT)
Dept: PERIOP | Facility: HOSPITAL | Age: 44
End: 2024-11-19
Payer: COMMERCIAL

## 2024-11-19 PROCEDURE — NC001 PR NO CHARGE: Performed by: UROLOGY

## 2024-11-19 NOTE — ANESTHESIA PREPROCEDURE EVALUATION
Procedure:  CYSTOSCOPY URETEROSCOPY WITH LITHOTRIPSY HOLMIUM LASER, RETROGRADE PYELOGRAM AND INSERTION STENT URETERAL (Right: Bladder)    Relevant Problems   /RENAL   (+) Kidney stone      HEMATOLOGY   (+) Anemia      NEURO/PSYCH   (+) Generalized seizure disorder (HCC)      PULMONARY   (+) Smoking      Behavioral Health   (+) Marijuana smoker      Surgery/Wound/Pain   (+) Renal colic on right side with hydronephrosis      Other   (+) Dyspepsia   Last seizure =   Occasional smoker   Latest Reference Range & Units 10/24/24 04:39   Hemoglobin 12.0 - 17.0 g/dL 10.6 (L)   (L): Data is abnormally low     Physical Exam    Airway    Mallampati score: II  TM Distance: >3 FB  Neck ROM: full     Dental       Cardiovascular  Rhythm: regular, Rate: normal    Pulmonary   Breath sounds clear to auscultation    Other Findings        Anesthesia Plan  ASA Score- 2     Anesthesia Type- general with ASA Monitors.         Additional Monitors:     Airway Plan:            Plan Factors-            Patient is a current smoker.              Induction- intravenous.    Postoperative Plan- Plan for postoperative opioid use.     Perioperative Resuscitation Plan - Level 1 - Full Code.       Informed Consent- Anesthetic plan and risks discussed with patient.  I personally reviewed this patient with the CRNA. Discussed and agreed on the Anesthesia Plan with the CRNA..

## 2024-11-19 NOTE — H&P
History & Physical - Urology  Marjorie Castro 44 y.o. male MRN: [id number]  Unit/Bed#:  Encounter: [id number]    ASSESSMENT/PLAN:    Renal stone   Right laser lithotripsy 10/21/24  Mult right renal calculi measuring up to 1cm on CT 11/01/24  Cystoscopy, right retrograde, ureteroscopy, laser lithotripsy, stone extraction, stent placement           HISTORY OF PRESENT ILLNESS:  44 t/o male with hx of renal calculus had right laser lithotripsy 10/21/24.  Presents for treatment of his remaining right renal calculus.    PAST MEDICAL HISTORY:  Past Medical History:   Diagnosis Date    Generalized seizure disorder (HCC)     Hip strain, left, subsequent encounter     Kidney stone     Seizures (HCC)     Shoulder strain, left, subsequent encounter        PAST SURGICAL HISTORY:  Past Surgical History:   Procedure Laterality Date    COLON SURGERY      FL RETROGRADE PYELOGRAM  10/21/2024    LITHOTRIPSY      MT CYSTO/URETERO W/LITHOTRIPSY &INDWELL STENT INSRT Right 10/21/2024    Procedure: CYSTOSCOPY URETEROSCOPY WITH LITHOTRIPSY HOLMIUM LASER, RETROGRADE PYELOGRAM AND INSERTION STENT URETERAL;  Surgeon: Ritchie Gates DO;  [facility];  Service: Urology       ALLERGIES:  No Known Allergies    SOCIAL HISTORY:  Social History     Substance and Sexual Activity   Alcohol Use Yes    Comment: 2-3 drinks on weekend     Social History     Substance and Sexual Activity   Drug Use No     Social History     Tobacco Use   Smoking Status Some Days    Types: Cigarettes, Cigars   Smokeless Tobacco Never   Tobacco Comments    Occasional cigar and cigg when drinks       FAMILY HISTORY:  Family History   Problem Relation Age of Onset    No Known Problems Mother     Hypertension Father     Diabetes Father     No Known Problems Brother        MEDICATIONS:  No current facility-administered medications for this encounter.    Current Outpatient Medications:     acetaminophen (TYLENOL) 325 mg tablet, Take 2 tablets (650 mg total) by  mouth every 6 (six) hours as needed for mild pain, headaches or fever, Disp: , Rfl:     aluminum-magnesium hydroxide-simethicone (MAALOX) 9130-4880-855 mg/30 mL suspension, Take 30 mL by mouth every 8 (eight) hours as needed for indigestion or heartburn, Disp: , Rfl:     Brivaracetam 100 MG TABS, Take 100 mg by mouth in the morning Takes in am., Disp: , Rfl:     ondansetron (ZOFRAN) 4 mg tablet, Take 1 tablet (4 mg total) by mouth every 8 (eight) hours as needed for nausea or vomiting, Disp: 20 tablet, Rfl: 0    oxybutynin (DITROPAN-XL) 5 mg 24 hr tablet, Take 1 tablet (5 mg total) by mouth daily as needed (As needed for bladder spasms or stent discomfort), Disp: 30 tablet, Rfl: 0    oxyCODONE (ROXICODONE) 5 immediate release tablet, Take 1 tablet (5 mg total) by mouth every 6 (six) hours as needed for severe pain for up to 5 doses Max Daily Amount: 20 mg, Disp: 5 tablet, Rfl: 0    pantoprazole (PROTONIX) 40 mg tablet, Take 1 tablet (40 mg total) by mouth daily before breakfast (Patient taking differently: Take 40 mg by mouth if needed), Disp: 30 tablet, Rfl: 0    polyethylene glycol (MIRALAX) 17 g packet, Take 17 g by mouth daily, Disp: , Rfl:     tamsulosin (FLOMAX) 0.4 mg, Take 1 capsule (0.4 mg total) by mouth daily with dinner, Disp: 30 capsule, Rfl: 0    Multiple Vitamins-Minerals (CENTRUM SILVER PO), Take by mouth (Patient not taking: Reported on 10/11/2024), Disp: , Rfl:     Review of Systems    PHYSICAL EXAM:  As noted 10/24/24  Constitutional:       General: He is not in acute distress.     Appearance: Normal appearance. He is normal weight. He is not ill-appearing or toxic-appearing.   HENT:      Head: Normocephalic and atraumatic.      Right Ear: External ear normal.      Left Ear: External ear normal.      Nose: Nose normal.      Mouth/Throat:      Mouth: Mucous membranes are moist.   Eyes:      General: No scleral icterus.     Conjunctiva/sclera: Conjunctivae normal.   Cardiovascular:      Rate and  "Rhythm: Normal rate.      Pulses: Normal pulses.   Pulmonary:      Effort: Pulmonary effort is normal.   Musculoskeletal:         General: Normal range of motion.      Cervical back: Normal range of motion.   Skin:     General: Skin is warm.      Findings: No rash.   Neurological:      General: No focal deficit present.      Mental Status: He is alert and oriented to person, place, and time. Mental status is at baseline.   Psychiatric:         Mood and Affect: Mood normal.         Behavior: Behavior normal.         Thought Content: Thought content normal.         Judgment: Judgment normal.        LAB RESULTS:  Lab Results   Component Value Date    WBC 8.41 10/24/2024    HGB 10.6 (L) 10/24/2024    HCT 33.8 (L) 10/24/2024    MCV 90 10/24/2024     10/24/2024     Lab Results   Component Value Date    SODIUM 136 10/24/2024    K 3.7 10/24/2024     10/24/2024    CO2 25 10/24/2024    BUN 10 10/24/2024    CREATININE 0.97 10/24/2024    GLUC 103 10/24/2024    CALCIUM 8.9 10/24/2024     Lab Results   Component Value Date    CALCIUM 8.9 10/24/2024         Masood Tobias PA-C  11/19/24    Portions of the record may have been created with voice recognition software.  Occasional wrong word or \"sound alike\" substitutions may have occurred due to the inherent limitations of voice recognition software.  Read the chart carefully and recognize, using context, where substitutions have occurred.  "

## 2024-11-20 ENCOUNTER — APPOINTMENT (OUTPATIENT)
Dept: RADIOLOGY | Facility: HOSPITAL | Age: 44
End: 2024-11-20
Payer: COMMERCIAL

## 2024-11-20 ENCOUNTER — HOSPITAL ENCOUNTER (OUTPATIENT)
Facility: HOSPITAL | Age: 44
Setting detail: OUTPATIENT SURGERY
Discharge: HOME | End: 2024-11-20
Attending: UROLOGY | Admitting: UROLOGY
Payer: COMMERCIAL

## 2024-11-20 ENCOUNTER — ANESTHESIA (OUTPATIENT)
Dept: PERIOP | Facility: HOSPITAL | Age: 44
End: 2024-11-20
Payer: COMMERCIAL

## 2024-11-20 ENCOUNTER — TELEPHONE (OUTPATIENT)
Dept: UROLOGY | Facility: CLINIC | Age: 44
End: 2024-11-20

## 2024-11-20 VITALS
HEIGHT: 64 IN | DIASTOLIC BLOOD PRESSURE: 90 MMHG | HEART RATE: 86 BPM | WEIGHT: 194 LBS | TEMPERATURE: 97.7 F | RESPIRATION RATE: 16 BRPM | BODY MASS INDEX: 33.12 KG/M2 | OXYGEN SATURATION: 99 % | SYSTOLIC BLOOD PRESSURE: 141 MMHG

## 2024-11-20 DIAGNOSIS — N20.0 KIDNEY STONE: Primary | ICD-10-CM

## 2024-11-20 DIAGNOSIS — N20.0 KIDNEY STONE: ICD-10-CM

## 2024-11-20 PROBLEM — F12.90 MARIJUANA SMOKER: Status: ACTIVE | Noted: 2024-11-20

## 2024-11-20 PROBLEM — F17.200 SMOKING: Status: ACTIVE | Noted: 2024-11-20

## 2024-11-20 PROBLEM — IMO0001 SMOKING: Status: ACTIVE | Noted: 2024-11-20

## 2024-11-20 PROCEDURE — 74420 UROGRAPHY RTRGR +-KUB: CPT

## 2024-11-20 PROCEDURE — C1758 CATHETER, URETERAL: HCPCS | Performed by: UROLOGY

## 2024-11-20 PROCEDURE — C1894 INTRO/SHEATH, NON-LASER: HCPCS | Performed by: UROLOGY

## 2024-11-20 PROCEDURE — C2617 STENT, NON-COR, TEM W/O DEL: HCPCS | Performed by: UROLOGY

## 2024-11-20 PROCEDURE — 52356 CYSTO/URETERO W/LITHOTRIPSY: CPT | Performed by: UROLOGY

## 2024-11-20 PROCEDURE — C1769 GUIDE WIRE: HCPCS | Performed by: UROLOGY

## 2024-11-20 PROCEDURE — 82360 CALCULUS ASSAY QUANT: CPT | Performed by: UROLOGY

## 2024-11-20 DEVICE — INLAY URETERAL STENT W/O GUIDEWIRE
Type: IMPLANTABLE DEVICE | Site: URETER | Status: FUNCTIONAL
Brand: BARD® INLAY® URETERAL STENT

## 2024-11-20 RX ORDER — CEFADROXIL 500 MG/1
500 CAPSULE ORAL EVERY 12 HOURS
Qty: 6 CAPSULE | Refills: 0 | Status: SHIPPED | OUTPATIENT
Start: 2024-11-20 | End: 2024-11-23

## 2024-11-20 RX ORDER — CEFAZOLIN SODIUM 2 G/50ML
2000 SOLUTION INTRAVENOUS
Status: COMPLETED | OUTPATIENT
Start: 2024-11-20 | End: 2024-11-20

## 2024-11-20 RX ORDER — FENTANYL CITRATE/PF 50 MCG/ML
25 SYRINGE (ML) INJECTION
Status: DISCONTINUED | OUTPATIENT
Start: 2024-11-20 | End: 2024-11-20 | Stop reason: HOSPADM

## 2024-11-20 RX ORDER — MIDAZOLAM HYDROCHLORIDE 2 MG/2ML
INJECTION, SOLUTION INTRAMUSCULAR; INTRAVENOUS AS NEEDED
Status: DISCONTINUED | OUTPATIENT
Start: 2024-11-20 | End: 2024-11-20

## 2024-11-20 RX ORDER — FENTANYL CITRATE 50 UG/ML
INJECTION, SOLUTION INTRAMUSCULAR; INTRAVENOUS AS NEEDED
Status: DISCONTINUED | OUTPATIENT
Start: 2024-11-20 | End: 2024-11-20

## 2024-11-20 RX ORDER — PROPOFOL 10 MG/ML
INJECTION, EMULSION INTRAVENOUS AS NEEDED
Status: DISCONTINUED | OUTPATIENT
Start: 2024-11-20 | End: 2024-11-20

## 2024-11-20 RX ORDER — MAGNESIUM HYDROXIDE 1200 MG/15ML
LIQUID ORAL AS NEEDED
Status: DISCONTINUED | OUTPATIENT
Start: 2024-11-20 | End: 2024-11-20 | Stop reason: HOSPADM

## 2024-11-20 RX ORDER — LIDOCAINE HYDROCHLORIDE 10 MG/ML
INJECTION, SOLUTION EPIDURAL; INFILTRATION; INTRACAUDAL; PERINEURAL AS NEEDED
Status: DISCONTINUED | OUTPATIENT
Start: 2024-11-20 | End: 2024-11-20

## 2024-11-20 RX ORDER — ONDANSETRON 2 MG/ML
INJECTION INTRAMUSCULAR; INTRAVENOUS AS NEEDED
Status: DISCONTINUED | OUTPATIENT
Start: 2024-11-20 | End: 2024-11-20

## 2024-11-20 RX ORDER — DEXAMETHASONE SODIUM PHOSPHATE 10 MG/ML
INJECTION, SOLUTION INTRAMUSCULAR; INTRAVENOUS AS NEEDED
Status: DISCONTINUED | OUTPATIENT
Start: 2024-11-20 | End: 2024-11-20

## 2024-11-20 RX ORDER — SODIUM CHLORIDE, SODIUM LACTATE, POTASSIUM CHLORIDE, CALCIUM CHLORIDE 600; 310; 30; 20 MG/100ML; MG/100ML; MG/100ML; MG/100ML
75 INJECTION, SOLUTION INTRAVENOUS CONTINUOUS
Status: DISCONTINUED | OUTPATIENT
Start: 2024-11-20 | End: 2024-11-20 | Stop reason: HOSPADM

## 2024-11-20 RX ADMIN — LIDOCAINE HYDROCHLORIDE 50 MG: 10 INJECTION, SOLUTION EPIDURAL; INFILTRATION; INTRACAUDAL; PERINEURAL at 11:09

## 2024-11-20 RX ADMIN — FENTANYL CITRATE 25 MCG: 50 INJECTION, SOLUTION INTRAMUSCULAR; INTRAVENOUS at 11:42

## 2024-11-20 RX ADMIN — CEFAZOLIN SODIUM 2000 MG: 2 SOLUTION INTRAVENOUS at 11:07

## 2024-11-20 RX ADMIN — MIDAZOLAM 2 MG: 1 INJECTION INTRAMUSCULAR; INTRAVENOUS at 11:01

## 2024-11-20 RX ADMIN — PROPOFOL 200 MG: 10 INJECTION, EMULSION INTRAVENOUS at 11:09

## 2024-11-20 RX ADMIN — DEXAMETHASONE SODIUM PHOSPHATE 10 MG: 10 INJECTION, SOLUTION INTRAMUSCULAR; INTRAVENOUS at 11:17

## 2024-11-20 RX ADMIN — PROPOFOL 50 MG: 10 INJECTION, EMULSION INTRAVENOUS at 12:35

## 2024-11-20 RX ADMIN — ONDANSETRON 4 MG: 2 INJECTION INTRAMUSCULAR; INTRAVENOUS at 11:18

## 2024-11-20 RX ADMIN — SODIUM CHLORIDE, SODIUM LACTATE, POTASSIUM CHLORIDE, AND CALCIUM CHLORIDE 75 ML/HR: .6; .31; .03; .02 INJECTION, SOLUTION INTRAVENOUS at 09:16

## 2024-11-20 RX ADMIN — FENTANYL CITRATE 75 MCG: 50 INJECTION, SOLUTION INTRAMUSCULAR; INTRAVENOUS at 11:15

## 2024-11-20 NOTE — PERIOPERATIVE NURSING NOTE
Patient received from PACU, alert and awake. Wife at bed side. PO fluids offered. Vitals stable, no c/o pain a this time. . IV line is running.  Bed in lowest position. Call bell within reach. Plan of care in progress.

## 2024-11-20 NOTE — ANESTHESIA POSTPROCEDURE EVALUATION
Post-Op Assessment Note    CV Status:  Stable  Pain Score: 0    Pain management: adequate       Mental Status:  Awake   Hydration Status:  Stable   PONV Controlled:  None   Airway Patency:  Patent     Post Op Vitals Reviewed: Yes    No anethesia notable event occurred.    Staff: Anesthesiologist           Last Filed PACU Vitals:  Vitals Value Taken Time   Temp 97.7 °F (36.5 °C) 11/20/24 1250   Pulse 86 11/20/24 1325   /90 11/20/24 1325   Resp 16 11/20/24 1325   SpO2 99 % 11/20/24 1325       Modified Kisha:  Activity: 2 (11/20/2024 12:50 PM)  Respiration: 2 (11/20/2024 12:50 PM)  Circulation: 2 (11/20/2024 12:50 PM)  Consciousness: 1 (11/20/2024 12:50 PM)  Oxygen Saturation: 1 (11/20/2024 12:50 PM)  Modified Kisha Score: 8 (11/20/2024 12:50 PM)

## 2024-11-20 NOTE — INTERVAL H&P NOTE
Note reviewed and pt seen and examined with PA.  Agree with assessment and plan.  Heart and lungs were examined.  Regular with clear breath sounds bilaterally.  Consent obtained from the patient describing all the risks and benefits of the procedure.  All questions were answered.  Consent is signed and on the chart.

## 2024-11-20 NOTE — ANESTHESIA POSTPROCEDURE EVALUATION
Post-Op Assessment Note    CV Status:  Stable  Pain Score: 0    Pain management: adequate       Mental Status:  Sleepy and arousable   Hydration Status:  Stable   PONV Controlled:  Controlled   Airway Patency:  Patent and adequate     Post Op Vitals Reviewed: Yes    No anethesia notable event occurred.    Staff: CRNA         Last Filed PACU Vitals:  Vitals Value Taken Time   Temp 97.7 °F (36.5 °C) 11/20/24 1250   Pulse 83 11/20/24 1250   /86 11/20/24 1250   Resp 14 11/20/24 1250   SpO2 100 % 11/20/24 1250       Modified Kisha:  Activity: 2 (11/20/2024 12:50 PM)  Respiration: 2 (11/20/2024 12:50 PM)  Circulation: 2 (11/20/2024 12:50 PM)  Consciousness: 1 (11/20/2024 12:50 PM)  Oxygen Saturation: 1 (11/20/2024 12:50 PM)  Modified Kisha Score: 8 (11/20/2024 12:50 PM)

## 2024-11-20 NOTE — TELEPHONE ENCOUNTER
Secure chat from DR Romero:  will likely take out his stent this weekend but will need follow up with dr anderson or AP with an xray in a couple weeks. he lives in [place].

## 2024-11-20 NOTE — OP NOTE
OPERATIVE REPORT- Dr. Romero  PATIENT NAME: Marjorie Castro    :  1980  MRN: [id number]  Pt Location: [facility] OR ROOM 04    SURGERY DATE: 2024    Surgeon: Jordi Romero MD    Pre-op Diagnosis:  Right renal stones    Post-op Diagnosis:  Same    Procedure:  Cystoscopy  Fluoroscopy  Right retrograde pyelography  Right ureteroscopy  Laser lithotripsy  Stone basketing  Right ureteral stent placement    Specimen(s):   ID Type Source Tests Collected by Time Destination   1 : right kidney stone [facility] Kid[facility], Right STONE ANALYSIS, TISSUE EXAM Jordi Romero MD 2024 1238        Estimated Blood Loss: Minimal    Complications: None    Drains: 6 X 26 cm right ureteral stent with string to glans    Anesthesia type: gen    Indications for surgery: Please see the dictated history and physical.  Stones in lower pole.    Findings: Right lower pole renal stones.    Procedure and Technique:   After obtaining consent and identifying the patient, antibiotics were given as ordered and the patient was brought to the room.  All appropriate leads and monitors were placed and the patient was appropriately positioned on the table.  Anesthesia was administered and the patient was sterilely prepped and draped.  A timeout was performed where the patient name, , procedure, antibiotics, allergies, etc. were discussed.  All in the room were satisfied before the start of the operative procedure.  What follows are the operative findings and events.     Cystoscopy was performed.  imaging was obtained. The stone was *** visualized with x-ray and was located ***.  A retrograde pyelogram was performed *** confirming the position of the stone.  A guidewire was passed up the ureter to the kidney.  A second wire was passed without difficulty.  The safety wire was secured to the drape and then a *** cm long ureteral access sheath was gently passed over the second wire using fluoroscopic guidance.  The flexible ureteroscope  "was passed through the sheath up to the stone. The stone was identified and a picture was taken. The stone was broken with a *** micron laser fiber.      ***   Stones of any significant size were extracted with a stone basket.  A retrograde was performed. The scope was withdrawn down the ureter evaluating for any trauma. There were no stone fragments or trauma noted.     ***     The safety wire was backloaded through the cystoscope and the stent was placed over the wire.  The guidewire was removed and a good coil was noted proximally in the kidney and distally in the bladder.  The bladder was drained and the patient was awakened and  transferred to the PACU in satisfactory condition.    Plan:  Stent removal in one week.  ***    SIGNATURE: Jordi Romero MD  DATE: November 20, 2024  TIME: 1:00 PM    Portions of the record may have been created with voice recognition software.  Occasional wrong word or \"sound alike\" substitutions may have occurred due to the inherent limitations of voice recognition software.  Read the chart carefully and recognize, using context, where substitutions have occurred.  "

## 2024-11-20 NOTE — DISCHARGE INSTR - AVS FIRST PAGE
Antibiotic Instructions:  Post-op period:  Take first dose tonight. Take second and third dose tomorrow. (take one basically every 12 hours for a total of 3 doses)    For day of stent removal:  Restart morning of stent removal and take until finished (every 12 hours).  3 doses.      May remove stent on Saturday or Sunday depending how you feel.

## 2024-11-20 NOTE — PERIOPERATIVE NURSING NOTE
AVS reviewed with patient and his wife at bed side and all concerns were answered. IV line is taken out. Patient tolerated PO fluids well. Denies any pain at this time. String is visible , patient is aware about stent placement. All belongings were sent with patient. Patient left floor with firm understanding of discharge instructions. Patient escorted to her ride via wheelchair by this nurse.

## 2024-11-21 NOTE — TELEPHONE ENCOUNTER
Patient called today stating that he wants to have the office remove the stent for him.     Pt also cannot keep the 12/24 follow up with SONIA Mckeon. Pt will be out of town between 12/13-1/3/25.     Please review.    Call back [phone]

## 2024-11-21 NOTE — TELEPHONE ENCOUNTER
Pt called back for status of previous message.  Pt sts that he would like a call back to discuss the steps on how to remove the stent.  Pt sts he is nervous and anxious to take the stent out and would like to speak with office clinical staff before he makes a decision to remove it.  Pt cancelled f/u appt, he sts he will be on vacation 12/13/2024-1/3/2025.  Pt questioning if he would be able to have his f/u appt prior to him leaving for vacation.  Pt is also questioning if he would be able to drink beer while taking Cefadroxil medication.  Please review and advise.    Pt call back: [phone]

## 2024-11-22 NOTE — TELEPHONE ENCOUNTER
Called and spoke with Marjorie Castro and gave instructions on how to remove stent. I did schedule appt for removal in the event he cannot remove stent on his own. Education provided on possible symptoms post stent removal. Encourage increase in water intake. Pt asked about stone analysis result. Advised still in process. Appt scheduled for follow up and advised to get KUB prior to visit. Pt verbalized understanding and was thankful for call.

## 2024-11-22 NOTE — TELEPHONE ENCOUNTER
Would avoid any heavy lifting or strenuous activity until stent is out, after that no restrictions

## 2024-11-22 NOTE — TELEPHONE ENCOUNTER
Patient calling inquiring what his restrictions are and for how long. Specifically heavy lifting, inquiring when he can go back to gym. Reviewed a this time no heavy lifting or strenuous exercise per discharge instructions.     Please advise when he may resume activity.

## 2024-11-25 NOTE — TELEPHONE ENCOUNTER
MAIRA left requesting call back     Patient was scheduled a stent removal in the office today in the case of him being unable to remove at home. Patient never called and did not come in for appointment    When patient calls back please ask if the stent was removed

## 2024-11-27 NOTE — TELEPHONE ENCOUNTER
LM again stating to call office back to confirm stent was removed since he did not show to appt. If he calls back, please confirm.

## 2024-12-02 LAB
CALCIUM OXALATE DIHYDRATE MFR STONE IR: 70 %
COLOR STONE: NORMAL
COM MFR STONE: 30 %
COMMENT-STONE3: NORMAL
COMPOSITION: NORMAL
LABORATORY COMMENT REPORT: NORMAL
PHOTO: NORMAL
SIZE STONE: NORMAL MM
SPEC SOURCE SUBJ: NORMAL
STONE ANALYSIS-IMP: NORMAL
STONE ANALYSIS-IMP: NORMAL
WT STONE: 50 MG

## 2025-01-08 ENCOUNTER — TELEPHONE (OUTPATIENT)
Dept: UROLOGY | Facility: CLINIC | Age: 45
End: 2025-01-08

## 2025-02-07 ENCOUNTER — TELEPHONE (OUTPATIENT)
Dept: UROLOGY | Facility: CLINIC | Age: 45
End: 2025-02-07

## 2025-02-07 NOTE — TELEPHONE ENCOUNTER
Not clear if stent out.  Probably was since had external string.  Still we should confirm and also see back after a KUB to see if residual stones after his ureteroscopy.  Was a difficult case due to location of the stones.

## 2025-02-07 NOTE — TELEPHONE ENCOUNTER
LM for pt. If he calls back, please let pt know that he needs a KUB (order in chart) and a f/u in the office. Appt tentatively scheduled for 2/25 at 1:15pm, please confirm. Pt should have KUB done prior to this appt.

## 2025-02-07 NOTE — TELEPHONE ENCOUNTER
Per note on 11/27:  Breana FERNANDES    11/27/24  8:59 AM  Note     Summary: pt removed stent   Patient called stating he did removed his stent and he is doing fine.

## 2025-02-11 NOTE — TELEPHONE ENCOUNTER
"Spoke with patient. He informed me \"everything is good now\"  did not want appointment or KUB. I asked him several times if he was sure about canceling.  I removed from schedule  "

## 2025-02-11 NOTE — TELEPHONE ENCOUNTER
Called and spoke to pt, Dr. Romero's message relayed. Pt then said he would call the office back and then there was no answer. If pt does call back, please reiterate that pt needs KUB xray and confirm f/u for 2/25 at 1:15pm. Xray needs to be done prior to this.

## 2025-02-11 NOTE — TELEPHONE ENCOUNTER
Called pt to make sure he did not want to get KUB/come in for f/u. Had to LM. If pt calls back, please ask pt if he truly does not want to get xray/ does not want to come in for f/u.

## 2025-03-13 ENCOUNTER — APPOINTMENT (OUTPATIENT)
Dept: LAB | Facility: MEDICAL CENTER | Age: 45
End: 2025-03-13
Payer: COMMERCIAL

## 2025-03-13 DIAGNOSIS — D50.8 IRON DEFICIENCY ANEMIA SECONDARY TO INADEQUATE DIETARY IRON INTAKE: ICD-10-CM

## 2025-03-13 DIAGNOSIS — N23 RENAL COLIC ON RIGHT SIDE: ICD-10-CM

## 2025-03-13 DIAGNOSIS — E53.8 B12 DEFICIENCY: ICD-10-CM

## 2025-03-13 LAB
FERRITIN SERPL-MCNC: 10 NG/ML (ref 24–336)
IRON SATN MFR SERPL: 20 % (ref 15–50)
IRON SERPL-MCNC: 108 UG/DL (ref 50–212)
PTH-INTACT SERPL-MCNC: 27.4 PG/ML (ref 12–88)
TIBC SERPL-MCNC: 540.4 UG/DL (ref 250–450)
TRANSFERRIN SERPL-MCNC: 386 MG/DL (ref 203–362)
UIBC SERPL-MCNC: 432 UG/DL (ref 155–355)
URATE SERPL-MCNC: 6 MG/DL (ref 3.5–8.5)

## 2025-03-13 PROCEDURE — 84550 ASSAY OF BLOOD/URIC ACID: CPT

## 2025-03-13 PROCEDURE — 83550 IRON BINDING TEST: CPT

## 2025-03-13 PROCEDURE — 83540 ASSAY OF IRON: CPT

## 2025-03-13 PROCEDURE — 83970 ASSAY OF PARATHORMONE: CPT

## 2025-03-13 PROCEDURE — 36415 COLL VENOUS BLD VENIPUNCTURE: CPT

## 2025-03-13 PROCEDURE — 82728 ASSAY OF FERRITIN: CPT

## 2025-04-03 ENCOUNTER — OFFICE VISIT (OUTPATIENT)
Dept: NEUROLOGY | Facility: CLINIC | Age: 45
End: 2025-04-03
Payer: COMMERCIAL

## 2025-04-03 VITALS
OXYGEN SATURATION: 97 % | DIASTOLIC BLOOD PRESSURE: 98 MMHG | HEIGHT: 64 IN | BODY MASS INDEX: 33.3 KG/M2 | TEMPERATURE: 97.8 F | SYSTOLIC BLOOD PRESSURE: 148 MMHG | HEART RATE: 92 BPM | RESPIRATION RATE: 18 BRPM

## 2025-04-03 DIAGNOSIS — G40.309 GENERALIZED SEIZURE DISORDER (HCC): Primary | ICD-10-CM

## 2025-04-03 PROCEDURE — 99204 OFFICE O/P NEW MOD 45 MIN: CPT | Performed by: PSYCHIATRY & NEUROLOGY

## 2025-04-03 RX ORDER — FERROUS SULFATE 325(65) MG
1 TABLET ORAL
COMMUNITY

## 2025-04-03 NOTE — PROGRESS NOTES
Neurology Ambulatory Visit  Name: Marjorie Castro       : 1980       MRN: [id number]   Encounter Provider: Celine Sanchez MD   Encounter Date: 4/3/2025  Encounter [facility]: [facility]      Marjorie Castro is a 45 y.o. male.       Assessment & Plan  Generalized seizure disorder (HCC)    Orders:    Brivaracetam; Future    Comprehensive metabolic panel; Future    EEG Routine and awake; Future    Patient's prior records and Dr. Ordonez's notes were reviewed, patient has history of primary generalized seizure, and was initially on Depakote which he discontinued secondary to the side effects for the last 2 years he has been on Brivaracetam sustained-release 100 mg once a day and has been seizure-free, I discussed with the patient that going forward in the future maybe we can put him on brivaracetam 50 mg twice a day as I am not aware if he have a sustained-release here in [place] also spoke to my epilepsy colleagues and she also agreed with that, patient tells me that he has enough of the medication for now and hence he will continue the same I advised him to check a blood level so that we can assess if it is therapeutic he will call me after that to discuss the results.  Also he is going to have an EEG and we will discuss the results after that.    He was advised to take seizure precautions which we discussed in detail including avoiding seizure triggers and to take safety precautions and first-aid safety measures were discussed with the patient he was advised to avoid climbing heights alone he was advised to avoid swimming alone avoid alcohol avoid smoking and marijuana use, to pull to the side if he is driving if he experiences any aura or not feeling well, he was advised a healthy lifestyle to keep his blood pressure, cholesterol, sugar under control keep himself well-hydrated, to go to the hospital if has any recurrence of seizures or worsening neurological complaints and  falls otherwise to see me back in 6 months or sooner if needed and follow-up with the other physicians, for technical reasons the chart could not be dictated on the same day,    Subjective:  Patient is here in follow-up for his history of seizure    HISTORY OF PRESENT ILLNESS    45-year-old male with past medical history of seizure disorder since he was 8 years old who was seeing my associate Dr. Ordonez and has been diagnosed with a generalized seizure disorder that he describes without any aura and a generalized seizure with loss of consciousness, it was controlled and Depakote but patient had side effects to Depakote including hair loss and other side effects and hence she had stopped using the medication his last seizure was about 2 years back and he was started on the Brivarecetam 100 mg sustained-release once a day when he was in [place] and since then he tells me that overall he has been doing good he has not had any recurrence of the seizures no side effects he is tolerating it well and is here in routine follow-up to establish his care, he does drink couple of drinks on the weekend at home, denies any marijuana use, otherwise he is in good health he is active, no headaches no vision or speech difficulty appetite is good he is trying to lose weight , mood is good, sleep is good no other complaints.        Prior Work-up:   MRI: Brain with and without contrast from 12/13/2016 was reported as no acute infarction no MR evidence of mesial temporal sclerosis.  EEG from 11/16/2016 was reported as normal       Past Medical History:    Past Medical History:   Diagnosis Date    Generalized seizure disorder (HCC)     Hip strain, left, subsequent encounter     Kidney stone     Seizures (HCC)     Shoulder strain, left, subsequent encounter      Past Surgical History:   Procedure Laterality Date    COLON SURGERY      FL RETROGRADE PYELOGRAM  10/21/2024    LITHOTRIPSY      WI CYSTO/URETERO W/LITHOTRIPSY &INDWELL STENT INSRT  Right 10/21/2024    Procedure: CYSTOSCOPY URETEROSCOPY WITH LITHOTRIPSY HOLMIUM LASER, RETROGRADE PYELOGRAM AND INSERTION STENT URETERAL;  Surgeon: Ritchie Gates DO;  Location: [facility] MAIN OR;  Service: Urology    [place] CYSTO/URETERO W/LITHOTRIPSY &INDWELL STENT INSRT Right 11/20/2024    Procedure: CYSTOSCOPY, RETROGRADE PYELOGRAM, URETEROSCOPY, STONE BASKET EXTRACTION, LITHOTRIPSY HOLMIUM LASER,  AND EXCHANGE STENT URETERAL;  Surgeon: Jordi Romero MD;  Location: [facility] MAIN OR;  Service: Urology       Family History:  Family History   Problem Relation Age of Onset    No Known Problems Mother     Hypertension Father     Diabetes Father     No Known Problems Brother        Social History:  Social History     Tobacco Use    Smoking status: Some Days     Types: Cigarettes, Cigars    Smokeless tobacco: Never    Tobacco comments:     Occasional cigar and cigg when drinks   Vaping Use    Vaping status: Never Used   Substance Use Topics    Alcohol use: Yes     Comment: 2-3 drinks on weekend    Drug use: No      Living situation:    Work:      Allergies:  No Known Allergies    Medications:    Current Outpatient Medications:     acetaminophen (TYLENOL) 325 mg tablet, Take 2 tablets (650 mg total) by mouth every 6 (six) hours as needed for mild pain, headaches or fever, Disp: , Rfl:     aluminum-magnesium hydroxide-simethicone (MAALOX) 8272-9144-506 mg/30 mL suspension, Take 30 mL by mouth every 8 (eight) hours as needed for indigestion or heartburn, Disp: , Rfl:     Brivaracetam 100 MG TABS, Take 100 mg by mouth in the morning Takes in am., Disp: , Rfl:     Multiple Vitamins-Minerals (CENTRUM SILVER PO), Take by mouth, Disp: , Rfl:     ferrous sulfate 325 (65 Fe) mg tablet, Take 1 tablet by mouth daily with breakfast, Disp: , Rfl:     ondansetron (ZOFRAN) 4 mg tablet, Take 1 tablet (4 mg total) by mouth every 8 (eight) hours as needed for nausea or vomiting (Patient not taking: Reported on 4/3/2025), Disp: 20 tablet,  "Rfl: 0    oxybutynin (DITROPAN-XL) 5 mg 24 hr tablet, Take 1 tablet (5 mg total) by mouth daily as needed (As needed for bladder spasms or stent discomfort) (Patient not taking: Reported on 4/3/2025), Disp: 30 tablet, Rfl: 0    oxyCODONE (ROXICODONE) 5 immediate release tablet, Take 1 tablet (5 mg total) by mouth every 6 (six) hours as needed for severe pain for up to 5 doses Max Daily Amount: 20 mg (Patient not taking: Reported on 4/3/2025), Disp: 5 tablet, Rfl: 0    pantoprazole (PROTONIX) 40 mg tablet, Take 1 tablet (40 mg total) by mouth daily before breakfast (Patient not taking: Reported on 4/3/2025), Disp: 30 tablet, Rfl: 0    polyethylene glycol (MIRALAX) 17 g packet, Take 17 g by mouth daily (Patient not taking: Reported on 4/3/2025), Disp: , Rfl:     tamsulosin (FLOMAX) 0.4 mg, Take 1 capsule (0.4 mg total) by mouth daily with dinner (Patient not taking: Reported on 4/3/2025), Disp: 30 capsule, Rfl: 0    Objective:  /98 (BP Location: Right arm, Patient Position: Sitting, Cuff Size: Standard)   Pulse 92   Temp 97.8 °F (36.6 °C) (Temporal)   Resp 18   Ht 5' 4\" (1.626 m)   SpO2 97%   BMI 33.30 kg/m²      Labs  I have reviewed pertinent labs:  CBC:   Lab Results   Component Value Date    WBC 5.30 03/13/2025    RBC 4.85 03/13/2025    HGB 12.9 03/13/2025    HCT 41.9 03/13/2025    MCV 86 03/13/2025     03/13/2025    MCH 26.6 (L) 03/13/2025    MCHC 30.8 (L) 03/13/2025    RDW 15.0 03/13/2025    MPV 10.6 03/13/2025    NEUTROABS 3.30 03/13/2025     CMP:   Lab Results   Component Value Date    SODIUM 136 10/24/2024    K 3.7 10/24/2024     10/24/2024    CO2 25 10/24/2024    AGAP 10 10/24/2024    BUN 10 10/24/2024    CREATININE 0.97 10/24/2024    GLUC 103 10/24/2024    GLUF 101 (H) 10/23/2024    CALCIUM 8.9 10/24/2024    AST 39 10/21/2024    ALT 54 (H) 10/21/2024    ALKPHOS 55 10/21/2024    TP 7.8 10/21/2024    ALB 4.9 10/21/2024    TBILI 0.49 10/21/2024    EGFR 95 10/24/2024      "         General Exam  GENERAL APPEARANCE:  No distress, alert, interactive and cooperative.  CARDIOVASCULAR: Warm and well perfused  LUNGS: normal work of breathing on room air  EXTREMITIES: no peripheral edema     Neurologic Exam  MENTAL STATE:  Orientation was normal to time, place and person without aphasia or apraxia. Recent and remote memory was intact.  Attention span and concentration were normal. Language testing was normal for comprehension, repetition, expression, and naming.     CRANIAL NERVES:  CN 2       visual fields full to confrontation.  CN 3, 4, 6  Pupils round, 4 mm in diameter, equally reactive to light. Lids symmetric; no ptosis. EOMs normal alignment, full range. No nystagmus.  CN 5  Facial sensation intact bilaterally.  CN 7  Normal and symmetric facial strength.   CN 8  Hearing intact to finger rub bilaterally.  CN 9  Palate elevates symmetrically.  CN 11  Normal strength of shoulder shrug and neck turning.  CN 12  Tongue midline, with normal bulk and strength.     MOTOR:  Motor exam was normal. Muscle bulk and tone were normal in both upper and lower extremities. Muscle strength was 5/5 in distal and proximal muscles in both upper and lower extremities. No fasciculations, tremor or other abnormal movements were present.     REFLEXES:  RIGHT UE   LEFT UE  BR:2              BR:2    Biceps:2      Biceps:2    Triceps:2     Triceps:2       RIGHT LLE   LEFT LLE    Knee:2           Knee:2    Ankle:2         Ankle:2    Babinski: toes downgoing to plantar stimulation. No clonus.     SENSORY:  Intact to temperature and vibratory sensation in the upper and lower extremities bilaterally. Cortical function is intact.    COORDINATION:   Coordination exam was normal. In both upper extremities, finger-nose-finger was intact without dysmetria or overshoot.      GAIT:  Gait was normal. Station was stable with a normal base. Gait was stable with a normal arm swing and speed. Tandem gait was intact. No Romberg  sign was present.      ROS:  Review of Systems    I have reviewed the past medical history, surgical history, social and family history, current medications, allergies vitals, review of systems, and updated this information as appropriate today.    Administrative Statements   The total amount of time spent with the patient and on chart review and documentation was  45 minutes. Issues addressed during this clinic visit included overall management, medication counseling or monitoring, and counseling and coordination of care.         Celine Sanchez MD

## 2025-04-03 NOTE — ASSESSMENT & PLAN NOTE
Orders:    Brivaracetam; Future    Comprehensive metabolic panel; Future    EEG Routine and awake; Future

## 2025-05-13 DIAGNOSIS — R94.31 ABNORMAL EKG: Primary | ICD-10-CM

## 2025-05-20 ENCOUNTER — HOSPITAL ENCOUNTER (OUTPATIENT)
Dept: CT IMAGING | Facility: HOSPITAL | Age: 45
Discharge: HOME | End: 2025-05-20
Attending: INTERNAL MEDICINE
Payer: COMMERCIAL

## 2025-05-20 DIAGNOSIS — R94.31 ABNORMAL EKG: ICD-10-CM

## 2025-05-20 PROCEDURE — 75571 CT HRT W/O DYE W/CA TEST: CPT

## 2025-05-22 ENCOUNTER — OFFICE VISIT (OUTPATIENT)
Dept: CARDIOLOGY CLINIC | Facility: CLINIC | Age: 45
End: 2025-05-22

## 2025-05-22 VITALS
BODY MASS INDEX: 33.29 KG/M2 | SYSTOLIC BLOOD PRESSURE: 136 MMHG | DIASTOLIC BLOOD PRESSURE: 98 MMHG | RESPIRATION RATE: 17 BRPM | HEIGHT: 64 IN | WEIGHT: 195 LBS | OXYGEN SATURATION: 98 % | HEART RATE: 86 BPM

## 2025-05-22 DIAGNOSIS — R07.89 CHEST DISCOMFORT: ICD-10-CM

## 2025-05-22 DIAGNOSIS — R03.0 ELEVATED BP WITHOUT DIAGNOSIS OF HYPERTENSION: ICD-10-CM

## 2025-05-22 DIAGNOSIS — R06.02 SHORTNESS OF BREATH: Primary | ICD-10-CM

## 2025-05-22 PROCEDURE — 99203 OFFICE O/P NEW LOW 30 MIN: CPT | Performed by: INTERNAL MEDICINE

## 2025-05-22 NOTE — PROGRESS NOTES
Cardiology Consultation     Marjorie Castro  [id number]  1980  [facility] [place] [id number]    This patient presents for evaluation from a cardiac standpoint.  Patient has had intermittent episodes of chest discomfort not related to exertion.  Only lasting for few seconds.  He does not have any history of coronary artery disease or MI in the past.  He is found to have elevated blood pressures whenever he comes to physician office.    He denies any history of leg edema orthopnea PND.  He has history of seizure disorder followed by neurology.  He does smoke occasionally.  No family history of premature coronary artery disease.    No history of diabetes, hyperlipidemia or diagnosis of hypertension.    No previous cardiac workup.  No history of heart murmur or rheumatic fever.    1. Shortness of breath  Stress test only, exercise      2. Elevated BP without diagnosis of hypertension        3. Chest discomfort          Problem List[1]  Past Medical History[2]  Social History     Socioeconomic History    Marital status: /Civil Union     Spouse name: Not on file    Number of children: Not on file    Years of education: Not on file    Highest education level: Not on file   Occupational History    Not on file   Tobacco Use    Smoking status: Some Days     Types: Cigarettes, Cigars    Smokeless tobacco: Never    Tobacco comments:     Occasional cigar and cigg when drinks   Vaping Use    Vaping status: Never Used   Substance and Sexual Activity    Alcohol use: Yes     Comment: 2-3 drinks on weekend    Drug use: No    Sexual activity: Not on file   Other Topics Concern    Not on file   Social History Narrative    Not on file     Social Drivers of Health     Financial Resource Strain: Not At Risk (1/24/2025)    Received from [facility]    Financial Insecurity     In the last 12 months  "did you skip medications to save money?: No     In the last 12 months was there a time when you needed to see a doctor but could not because of cost?: No   Food Insecurity: No Food Insecurity (1/24/2025)    Received from [facility]    Food Insecurity     In the last 12 months did you ever eat less than you felt you should because there wasn't enough money for food?: No   Transportation Needs: No Transportation Needs (1/24/2025)    Received from [facility]    Transportation Needs     In the last 12 months have you ever had to go without healthcare because you didn't have a way to get there?: No   Physical Activity: Not on file   Stress: Not on file   Social Connections: Socially Integrated (1/24/2025)    Received from [facility]    Social Connection     Do you often feel lonely?: No   Intimate Partner Violence: Unknown (8/23/2023)    Received from [facility]    Humiliation, Afraid, Rape, and Kick questionnaire     Fear of Current or Ex-Partner: No     Emotionally Abused: No     Physically Abused: No     Sexually Abused: Not on file   Housing Stability: Not At Risk (1/24/2025)    Received from [facility]    Housing Stability     Are you worried that in the next 2 months you may not have stable housing?: No      Family History[3]  Past Surgical History[4]  Current Medications[5]  No Known Allergies  Vitals:    05/22/25 1303   BP: 136/98   BP Location: Left arm   Patient Position: Sitting   Cuff Size: Standard   Pulse: 86   Resp: 17   SpO2: 98%   Weight: 88.5 kg (195 lb)   Height: 5' 4\" (1.626 m)       Labs:  No visits with results within 2 Month(s) from this visit.   Latest known visit with results is:   Transcribe Orders on 03/13/2025   Component Date Value    WBC 03/13/2025 5.30     RBC 03/13/2025 4.85     Hemoglobin 03/13/2025 12.9     Hematocrit 03/13/2025 41.9     MCV 03/13/2025 86     MCH 03/13/2025 26.6 (L)     MCHC " 03/13/2025 30.8 (L)     RDW 03/13/2025 15.0     MPV 03/13/2025 10.6     Platelets 03/13/2025 271     nRBC 03/13/2025 0     Segmented % 03/13/2025 62     Immature Grans % 03/13/2025 1     Lymphocytes % 03/13/2025 28     Monocytes % 03/13/2025 6     Eosinophils Relative 03/13/2025 3     Basophils Relative 03/13/2025 0     Absolute Neutrophils 03/13/2025 3.30     Absolute Immature Grans 03/13/2025 0.03     Absolute Lymphocytes 03/13/2025 1.49     Absolute Monocytes 03/13/2025 0.29     Eosinophils Absolute 03/13/2025 0.17     Basophils Absolute 03/13/2025 0.02     Vitamin B-12 03/13/2025 230      Imaging: No results found.    Review of Systems:  Review of Systems  REVIEW OF SYSTEMS:  Constitutional:  Denies fever or chills   Eyes:  Denies change in visual acuity   HENT:  Denies nasal congestion or sore throat   Respiratory:  shortness of breath   Cardiovascular: Atypical chest pain  GI:  Denies abdominal pain, nausea, vomiting, bloody stools or diarrhea   :  Denies dysuria, frequency, difficulty in micturition and nocturia  Musculoskeletal:  Denies back pain or joint pain   Neurologic: History of seizure disorder  Endocrine:  Denies polyuria or polydipsia   Lymphatic:  Denies swollen glands   Psychiatric:  Denies depression or anxiety    Physical Exam:  Physical Exam  PHYSICAL EXAM:  General:  Patient is not in acute distress   Head: Normocephalic, Atraumatic.  HEENT:  Both pupils normal-size atraumatic, normocephalic, nonicteric  Neck:  JVP not raised. Trachea central. No carotid bruit  Respiratory:  normal breath sounds no crackles. no rhonchi  Cardiovascular:  Regular rate and rhythm no S3 no murmurs  GI:  Abdomen soft nontender. No organomegaly.   Lymphatic:  No cervical or inguinal lymphadenopathy  Neurologic:  Patient is awake alert, oriented . Grossly nonfocal  Extremities no edema    CT coronary calcium score is done report is pending.    Discussion/Summary:    This patient has intermittent elevation of blood  pressures without diagnosis of hypertension.  He states that he has been checking it at pharmacies on occasion and his blood pressure readings have been within normal limits.    Patient has been instructed to check his blood pressure more frequently and report any high readings.  Importance of salt restriction reinforced.    Patient will be scheduled for stress test to assess for exercise capacity as well as ischemia.    Sensitivity and specificity as well as limitations of different modalities of cardiac imaging discussed at length with patient.    Patient instructed to contact if blood pressures are elevated so that we may have to consider medications.    Follow-up on the results of the CT coronary calcium score.    Patient is agreeable with the plan of care.  Follow-up with primary care physician.       [1]   Patient Active Problem List  Diagnosis    Generalized seizure disorder (HCC)    Hematochezia    Syncope    Hydronephrosis of right kidney    Kidney stone    Renal colic on right side with hydronephrosis    Abnormal urinalysis    Anemia    Dyspepsia    Smoking    Marijuana smoker   [2]   Past Medical History:  Diagnosis Date    Generalized seizure disorder (HCC)     Hip strain, left, subsequent encounter     Kidney stone     Seizures (HCC)     Shoulder strain, left, subsequent encounter    [3]   Family History  Problem Relation Name Age of Onset    No Known Problems Mother      Hypertension Father      Diabetes Father      No Known Problems Brother     [4]   Past Surgical History:  Procedure Laterality Date    COLON SURGERY      FL RETROGRADE PYELOGRAM  10/21/2024    LITHOTRIPSY      AK CYSTO/URETERO W/LITHOTRIPSY &INDWELL STENT INSRT Right 10/21/2024    Procedure: CYSTOSCOPY URETEROSCOPY WITH LITHOTRIPSY HOLMIUM LASER, RETROGRADE PYELOGRAM AND INSERTION STENT URETERAL;  Surgeon: Ritchie Gates DO;  Location: [facility] MA[facility];  Service: Urology    AK CYSTO/URETERO W/LITHOTRIPSY &INDWELL STENT INSRT  Right 11/20/2024    Procedure: CYSTOSCOPY, RETROGRADE PYELOGRAM, URETEROSCOPY, STONE BASKET EXTRACTION, LITHOTRIPSY HOLMIUM LASER,  AND EXCHANGE STENT URETERAL;  Surgeon: Jordi Romero MD;  Location: [facility];  Service: Urology   [5]   Current Outpatient Medications:     acetaminophen (TYLENOL) 325 mg tablet, Take 2 tablets (650 mg total) by mouth every 6 (six) hours as needed for mild pain, headaches or fever, Disp: , Rfl:     Brivaracetam 100 MG TABS, Take 100 mg by mouth in the morning Takes in am., Disp: , Rfl:     ferrous sulfate 325 (65 Fe) mg tablet, Take 1 tablet by mouth every other day, Disp: , Rfl:     Multiple Vitamins-Minerals (CENTRUM SILVER PO), Take by mouth, Disp: , Rfl:      Island Pedicle Flap Text: The defect edges were debeveled with a #15 scalpel blade.  Given the location of the defect, shape of the defect and the proximity to free margins an island pedicle advancement flap was deemed most appropriate.  Using a sterile surgical marker, an appropriate advancement flap was drawn incorporating the defect, outlining the appropriate donor tissue and placing the expected incisions within the relaxed skin tension lines where possible.    The area thus outlined was incised deep to adipose tissue with a #15 scalpel blade.  The skin margins were undermined to an appropriate distance in all directions around the primary defect and laterally outward around the island pedicle utilizing iris scissors.  There was minimal undermining beneath the pedicle flap.

## 2025-05-28 ENCOUNTER — HOSPITAL ENCOUNTER (OUTPATIENT)
Dept: NON INVASIVE DIAGNOSTICS | Facility: CLINIC | Age: 45
Discharge: HOME | End: 2025-05-28
Attending: INTERNAL MEDICINE

## 2025-05-28 ENCOUNTER — RESULTS FOLLOW-UP (OUTPATIENT)
Dept: CARDIOLOGY CLINIC | Facility: CLINIC | Age: 45
End: 2025-05-28

## 2025-05-29 NOTE — TELEPHONE ENCOUNTER
----- Message from Stella Alejandro MD sent at 5/28/2025  8:11 PM EDT -----  Please call the patient to let him know that the calcium score is 0 which is good.    Patient was supposed to have stress test sometime this week.    ----- Message -----  From: Interface, Radiology Results In  Sent: 5/26/2025   5:00 PM EDT  To: Stella Alejandro MD

## 2025-07-30 ENCOUNTER — OFFICE VISIT (OUTPATIENT)
Dept: URGENT CARE | Facility: MEDICAL CENTER | Age: 45
End: 2025-07-30
Payer: COMMERCIAL

## 2025-07-30 VITALS
TEMPERATURE: 97.6 F | RESPIRATION RATE: 18 BRPM | OXYGEN SATURATION: 98 % | WEIGHT: 190 LBS | DIASTOLIC BLOOD PRESSURE: 70 MMHG | HEART RATE: 76 BPM | SYSTOLIC BLOOD PRESSURE: 120 MMHG | BODY MASS INDEX: 32.61 KG/M2

## 2025-07-30 DIAGNOSIS — H10.9 CONJUNCTIVITIS OF RIGHT EYE, UNSPECIFIED CONJUNCTIVITIS TYPE: Primary | ICD-10-CM

## 2025-07-30 PROCEDURE — 99213 OFFICE O/P EST LOW 20 MIN: CPT | Performed by: NURSE PRACTITIONER

## 2025-07-30 RX ORDER — OFLOXACIN 3 MG/ML
1 SOLUTION/ DROPS OPHTHALMIC 3 TIMES DAILY
Qty: 5 ML | Refills: 0 | Status: SHIPPED | OUTPATIENT
Start: 2025-07-30

## (undated) DEVICE — SINGLE-USE DIGITAL FLEXIBLE URETEROSCOPE: Brand: APTRA

## (undated) DEVICE — SYRINGE 10ML LL CONTROL TOP

## (undated) DEVICE — CHLORHEXIDINE 4PCT 4 OZ

## (undated) DEVICE — POUCH URO CATCHER II STERILE

## (undated) DEVICE — SEAL ADJUST BIOPSY PORT Y ADAPTOR

## (undated) DEVICE — CATH URETERAL 5FR X 70 CM FLEX TIP POLYUR BARD

## (undated) DEVICE — GLOVE SRG BIOGEL 7.5

## (undated) DEVICE — PREMIUM DRY TRAY LF: Brand: MEDLINE INDUSTRIES, INC.

## (undated) DEVICE — SPECIMEN CONTAINER: Brand: CARDINAL HEALTH

## (undated) DEVICE — FIBER STD QUANTA 272 MICRON

## (undated) DEVICE — RADIOLOGY STERILE LABELS: Brand: CENTURION

## (undated) DEVICE — CYSTO TUBING TUR Y IRRIGATION

## (undated) DEVICE — GUIDEWIRE STRGHT TIP 0.035 IN  SOLO PLUS

## (undated) DEVICE — STR GUIDEWIRE BOWL WITH LID PK: Brand: CARDINAL HEALTH

## (undated) DEVICE — FIBER STD QUANTA 200 MICRON

## (undated) DEVICE — SHEATH URETERAL ACCESS 10/12FR 35CM PROXIS

## (undated) DEVICE — GUIDEWIRE STRGHT TIP 0.038 IN SOLO PLUS

## (undated) DEVICE — INVIEW CLEAR LEGGINGS: Brand: CONVERTORS

## (undated) DEVICE — LUBRICANT JELLY SURGILUBE TUBE 4OZ FLIP TOP

## (undated) DEVICE — Device

## (undated) DEVICE — NGAGE, NITINOL STONE EXTRACTOR: Brand: NGAGE

## (undated) DEVICE — CYSTOSCOPY PACK: Brand: CONVERTORS

## (undated) DEVICE — CATH URET .038 10FR 50CM DUAL LUMEN

## (undated) DEVICE — FABRIC REINFORCED, SURGICAL GOWN, XL: Brand: CONVERTORS

## (undated) DEVICE — SHEATH URETERAL ACCESS 11/13FR 45CM PROXIS

## (undated) DEVICE — UROLOGIC DRAIN BAG: Brand: UNBRANDED

## (undated) DEVICE — PACK TUR

## (undated) DEVICE — SHEATH URETERAL ACCESS 12/14FR 35CM PROXIS

## (undated) DEVICE — TOWEL SET X-RAY